# Patient Record
Sex: MALE | Race: WHITE | Employment: UNEMPLOYED | URBAN - METROPOLITAN AREA
[De-identification: names, ages, dates, MRNs, and addresses within clinical notes are randomized per-mention and may not be internally consistent; named-entity substitution may affect disease eponyms.]

---

## 2017-05-11 ENCOUNTER — HOSPITAL ENCOUNTER (OUTPATIENT)
Age: 47
Setting detail: SPECIMEN
Discharge: HOME | End: 2017-05-11
Payer: MEDICARE

## 2017-05-11 LAB
ABSOLUTE EOS #: 0.2 K/UL (ref 0–0.4)
ABSOLUTE LYMPH #: 2.3 K/UL (ref 1–4.8)
ABSOLUTE MONO #: 0.5 K/UL (ref 0.1–1.2)
ALBUMIN SERPL-MCNC: 4.1 G/DL (ref 3.5–5.2)
ALBUMIN/GLOBULIN RATIO: 1.5 (ref 1–2.5)
ALP BLD-CCNC: 81 U/L (ref 40–129)
ALT SERPL-CCNC: 13 U/L (ref 5–41)
ANION GAP SERPL CALCULATED.3IONS-SCNC: 10 MMOL/L (ref 9–17)
AST SERPL-CCNC: 12 U/L
BASOPHILS # BLD: 0 %
BASOPHILS ABSOLUTE: 0 K/UL (ref 0–0.2)
BILIRUB SERPL-MCNC: 0.9 MG/DL (ref 0.3–1.2)
BUN BLDV-MCNC: 8 MG/DL (ref 6–20)
BUN/CREAT BLD: ABNORMAL (ref 9–20)
CALCIUM SERPL-MCNC: 9.1 MG/DL (ref 8.6–10.4)
CHLORIDE BLD-SCNC: 106 MMOL/L (ref 98–107)
CHOLESTEROL/HDL RATIO: 5.5
CHOLESTEROL: 193 MG/DL
CO2: 27 MMOL/L (ref 20–31)
CREAT SERPL-MCNC: 0.87 MG/DL (ref 0.7–1.2)
DIFFERENTIAL TYPE: NORMAL
EOSINOPHILS RELATIVE PERCENT: 3 %
ESTIMATED AVERAGE GLUCOSE: 88 MG/DL
GFR AFRICAN AMERICAN: >60 ML/MIN
GFR NON-AFRICAN AMERICAN: >60 ML/MIN
GFR SERPL CREATININE-BSD FRML MDRD: ABNORMAL ML/MIN/{1.73_M2}
GFR SERPL CREATININE-BSD FRML MDRD: ABNORMAL ML/MIN/{1.73_M2}
GLUCOSE BLD-MCNC: 107 MG/DL (ref 70–99)
HBA1C MFR BLD: 4.7 % (ref 4–6)
HCT VFR BLD CALC: 42.9 % (ref 41–53)
HDLC SERPL-MCNC: 35 MG/DL
HEMOGLOBIN: 14.7 G/DL (ref 13.5–17.5)
LDL CHOLESTEROL: 116 MG/DL (ref 0–130)
LYMPHOCYTES # BLD: 29 %
MCH RBC QN AUTO: 31.3 PG (ref 26–34)
MCHC RBC AUTO-ENTMCNC: 34.3 G/DL (ref 31–37)
MCV RBC AUTO: 91.1 FL (ref 80–100)
MONOCYTES # BLD: 6 %
PDW BLD-RTO: 13.1 % (ref 12.5–15.4)
PLATELET # BLD: 270 K/UL (ref 140–450)
PLATELET ESTIMATE: NORMAL
PMV BLD AUTO: 8.2 FL (ref 6–12)
POTASSIUM SERPL-SCNC: 3.7 MMOL/L (ref 3.7–5.3)
RBC # BLD: 4.71 M/UL (ref 4.5–5.9)
RBC # BLD: NORMAL 10*6/UL
SEG NEUTROPHILS: 62 %
SEGMENTED NEUTROPHILS ABSOLUTE COUNT: 5 K/UL (ref 1.8–7.7)
SODIUM BLD-SCNC: 143 MMOL/L (ref 135–144)
TOTAL PROTEIN: 6.8 G/DL (ref 6.4–8.3)
TRIGL SERPL-MCNC: 210 MG/DL
TSH SERPL DL<=0.05 MIU/L-ACNC: 1.98 MIU/L (ref 0.3–5)
VITAMIN D 25-HYDROXY: 28.4 NG/ML (ref 30–100)
VLDLC SERPL CALC-MCNC: ABNORMAL MG/DL (ref 1–30)
WBC # BLD: 8.1 K/UL (ref 3.5–11)
WBC # BLD: NORMAL 10*3/UL

## 2017-10-04 ENCOUNTER — HOSPITAL ENCOUNTER (OUTPATIENT)
Age: 47
Setting detail: SPECIMEN
Discharge: HOME | End: 2017-10-04
Payer: MEDICARE

## 2017-10-04 LAB
ALBUMIN SERPL-MCNC: 4.1 G/DL (ref 3.5–5.2)
ALBUMIN/GLOBULIN RATIO: 1.6 (ref 1–2.5)
ALP BLD-CCNC: 70 U/L (ref 40–129)
ALT SERPL-CCNC: 17 U/L (ref 5–41)
ANION GAP SERPL CALCULATED.3IONS-SCNC: 9 MMOL/L (ref 9–17)
AST SERPL-CCNC: 13 U/L
BILIRUB SERPL-MCNC: 0.67 MG/DL (ref 0.3–1.2)
BUN BLDV-MCNC: 6 MG/DL (ref 6–20)
BUN/CREAT BLD: ABNORMAL (ref 9–20)
CALCIUM SERPL-MCNC: 9.2 MG/DL (ref 8.6–10.4)
CHLORIDE BLD-SCNC: 107 MMOL/L (ref 98–107)
CHOLESTEROL/HDL RATIO: 4.5
CHOLESTEROL: 165 MG/DL
CO2: 26 MMOL/L (ref 20–31)
CREAT SERPL-MCNC: 0.84 MG/DL (ref 0.7–1.2)
GFR AFRICAN AMERICAN: >60 ML/MIN
GFR NON-AFRICAN AMERICAN: >60 ML/MIN
GFR SERPL CREATININE-BSD FRML MDRD: ABNORMAL ML/MIN/{1.73_M2}
GFR SERPL CREATININE-BSD FRML MDRD: ABNORMAL ML/MIN/{1.73_M2}
GLUCOSE BLD-MCNC: 112 MG/DL (ref 70–99)
HDLC SERPL-MCNC: 37 MG/DL
LDL CHOLESTEROL: 91 MG/DL (ref 0–130)
POTASSIUM SERPL-SCNC: 4.3 MMOL/L (ref 3.7–5.3)
SODIUM BLD-SCNC: 142 MMOL/L (ref 135–144)
TOTAL PROTEIN: 6.7 G/DL (ref 6.4–8.3)
TRIGL SERPL-MCNC: 183 MG/DL
VLDLC SERPL CALC-MCNC: ABNORMAL MG/DL (ref 1–30)

## 2020-02-03 ENCOUNTER — HOSPITAL ENCOUNTER (OUTPATIENT)
Age: 50
Setting detail: SPECIMEN
Discharge: HOME | End: 2020-02-03
Payer: MEDICARE

## 2020-02-03 LAB
ALBUMIN SERPL-MCNC: 4.5 G/DL (ref 3.5–5.2)
ALBUMIN/GLOBULIN RATIO: 1.5 (ref 1–2.5)
ALP BLD-CCNC: 117 U/L (ref 40–129)
ALT SERPL-CCNC: 28 U/L (ref 5–41)
ANION GAP SERPL CALCULATED.3IONS-SCNC: 17 MMOL/L (ref 9–17)
AST SERPL-CCNC: 24 U/L
BILIRUB SERPL-MCNC: 0.5 MG/DL (ref 0.3–1.2)
BUN BLDV-MCNC: 10 MG/DL (ref 6–20)
BUN/CREAT BLD: ABNORMAL (ref 9–20)
CALCIUM SERPL-MCNC: 9.6 MG/DL (ref 8.6–10.4)
CHLORIDE BLD-SCNC: 108 MMOL/L (ref 98–107)
CO2: 19 MMOL/L (ref 20–31)
CREAT SERPL-MCNC: 0.97 MG/DL (ref 0.7–1.2)
FERRITIN: 119 UG/L (ref 30–400)
GFR AFRICAN AMERICAN: >60 ML/MIN
GFR NON-AFRICAN AMERICAN: >60 ML/MIN
GFR SERPL CREATININE-BSD FRML MDRD: ABNORMAL ML/MIN/{1.73_M2}
GFR SERPL CREATININE-BSD FRML MDRD: ABNORMAL ML/MIN/{1.73_M2}
GLUCOSE BLD-MCNC: 113 MG/DL (ref 70–99)
IRON SATURATION: 56 % (ref 20–55)
IRON: 156 UG/DL (ref 59–158)
POTASSIUM SERPL-SCNC: 4.4 MMOL/L (ref 3.7–5.3)
SODIUM BLD-SCNC: 144 MMOL/L (ref 135–144)
TOTAL IRON BINDING CAPACITY: 280 UG/DL (ref 250–450)
TOTAL PROTEIN: 7.5 G/DL (ref 6.4–8.3)
UNSATURATED IRON BINDING CAPACITY: 124 UG/DL (ref 112–347)
VITAMIN D 25-HYDROXY: 20.4 NG/ML (ref 30–100)

## 2020-05-11 ENCOUNTER — TELEPHONE (OUTPATIENT)
Dept: PHARMACY | Facility: CLINIC | Age: 50
End: 2020-05-11

## 2020-05-11 NOTE — TELEPHONE ENCOUNTER
CLINICAL PHARMACY: ADHERENCE REVIEW  Identified care gap per [employer]; fills at [facility] healthcare: ACE/ARB adherence    Last Office Visit: unknown    Patient also appears to be taking: Lisinopril 20mg    ASSESSMENT  ACE/ARB ADHERENCE  PDC: 58%     Lisinopril last filled on 5/04/20 for a 30 day supply. 3 refills remaining. Billed through [employer]    BP Readings from Last 3 Encounters:   05/30/13 139/87   02/12/13 (!) 140/92   09/04/12 (!) 134/92     CrCl cannot be calculated (Unknown ideal weight.). PLAN  Reached patient to review. Patient declined a 90ds because he gets bubble packs and that helps him remember to take his medication. CLINICAL PHARMACY CONSULT: MED RECONCILIATION/REVIEW ADDENDUM    For Pharmacy Admin Tracking Only    PHSO: Yes  Total # of Interventions Recommended: 1  - New Order #: 0 New Medication Order Reason(s):  Adherence  - Maintenance Safety Lab Monitoring #: 1  - New Therapy Lab Monitoring #: 0  Recommended intervention potential cost savings: 0  Total Interventions Accepted: 0  Time Spent (min): Sorin Martinez

## 2020-06-22 ENCOUNTER — TELEPHONE (OUTPATIENT)
Dept: PHARMACY | Facility: CLINIC | Age: 50
End: 2020-06-22

## 2022-01-04 SDOH — HEALTH STABILITY: PHYSICAL HEALTH: ON AVERAGE, HOW MANY DAYS PER WEEK DO YOU ENGAGE IN MODERATE TO STRENUOUS EXERCISE (LIKE A BRISK WALK)?: 2 DAYS

## 2022-01-04 SDOH — HEALTH STABILITY: PHYSICAL HEALTH: ON AVERAGE, HOW MANY MINUTES DO YOU ENGAGE IN EXERCISE AT THIS LEVEL?: 90 MIN

## 2022-01-05 ENCOUNTER — OFFICE VISIT (OUTPATIENT)
Dept: FAMILY MEDICINE CLINIC | Age: 52
End: 2022-01-05
Payer: MEDICARE

## 2022-01-05 VITALS
BODY MASS INDEX: 30.35 KG/M2 | HEIGHT: 70 IN | RESPIRATION RATE: 20 BRPM | SYSTOLIC BLOOD PRESSURE: 144 MMHG | OXYGEN SATURATION: 98 % | DIASTOLIC BLOOD PRESSURE: 100 MMHG | HEART RATE: 96 BPM | WEIGHT: 212 LBS

## 2022-01-05 DIAGNOSIS — E78.5 HYPERLIPIDEMIA, UNSPECIFIED HYPERLIPIDEMIA TYPE: ICD-10-CM

## 2022-01-05 DIAGNOSIS — M48.061 SPINAL STENOSIS OF LUMBAR REGION, UNSPECIFIED WHETHER NEUROGENIC CLAUDICATION PRESENT: ICD-10-CM

## 2022-01-05 DIAGNOSIS — E23.2 DIABETES INSIPIDUS (HCC): ICD-10-CM

## 2022-01-05 DIAGNOSIS — R73.9 ELEVATED BLOOD SUGAR: ICD-10-CM

## 2022-01-05 DIAGNOSIS — Z13.29 SCREENING FOR THYROID DISORDER: ICD-10-CM

## 2022-01-05 DIAGNOSIS — I10 PRIMARY HYPERTENSION: Primary | ICD-10-CM

## 2022-01-05 DIAGNOSIS — F51.01 PRIMARY INSOMNIA: ICD-10-CM

## 2022-01-05 DIAGNOSIS — F31.31 BIPOLAR AFFECTIVE DISORDER, CURRENTLY DEPRESSED, MILD (HCC): ICD-10-CM

## 2022-01-05 PROCEDURE — 99203 OFFICE O/P NEW LOW 30 MIN: CPT | Performed by: INTERNAL MEDICINE

## 2022-01-05 RX ORDER — BUPROPION HYDROCHLORIDE 300 MG/1
TABLET ORAL
COMMUNITY
Start: 2019-05-04

## 2022-01-05 RX ORDER — HYDROCHLOROTHIAZIDE 25 MG/1
25 TABLET ORAL EVERY MORNING
Qty: 30 TABLET | Refills: 5 | Status: SHIPPED | OUTPATIENT
Start: 2022-01-05 | End: 2022-02-08 | Stop reason: SDUPTHER

## 2022-01-05 RX ORDER — KRILL/OM-3/DHA/EPA/PHOSPHO/AST 500MG-86MG
CAPSULE ORAL
COMMUNITY

## 2022-01-05 RX ORDER — LISINOPRIL 40 MG/1
TABLET ORAL
COMMUNITY
Start: 2021-12-03 | End: 2022-02-08 | Stop reason: SDUPTHER

## 2022-01-05 RX ORDER — PANTOPRAZOLE SODIUM 20 MG/1
TABLET, DELAYED RELEASE ORAL
COMMUNITY
Start: 2020-06-01 | End: 2022-02-08 | Stop reason: SDUPTHER

## 2022-01-05 RX ORDER — TAMSULOSIN HYDROCHLORIDE 0.4 MG/1
CAPSULE ORAL
COMMUNITY
Start: 2020-08-04

## 2022-01-05 RX ORDER — PSYLLIUM HUSK 0.4 G
CAPSULE ORAL
COMMUNITY

## 2022-01-05 RX ORDER — ACETAMINOPHEN, ASPIRIN AND CAFFEINE 250; 250; 65 MG/1; MG/1; MG/1
1 TABLET, FILM COATED ORAL EVERY 6 HOURS PRN
COMMUNITY

## 2022-01-05 RX ORDER — BUPROPION HYDROCHLORIDE 150 MG/1
TABLET ORAL
COMMUNITY
Start: 2019-05-04

## 2022-01-05 RX ORDER — OXCARBAZEPINE 600 MG/1
TABLET, FILM COATED ORAL
COMMUNITY

## 2022-01-05 RX ORDER — FLUOXETINE HYDROCHLORIDE 40 MG/1
CAPSULE ORAL
COMMUNITY
Start: 2020-01-01

## 2022-01-05 RX ORDER — AMILORIDE HYDROCHLORIDE 5 MG/1
TABLET ORAL
COMMUNITY
Start: 2018-05-04 | End: 2022-02-08 | Stop reason: SDUPTHER

## 2022-01-05 RX ORDER — ACETAMINOPHEN 500 MG
TABLET ORAL EVERY 6 HOURS PRN
COMMUNITY
Start: 1985-01-01

## 2022-01-05 RX ORDER — LORATADINE 10 MG/1
TABLET ORAL
COMMUNITY
Start: 1998-05-01 | End: 2022-04-18 | Stop reason: SDUPTHER

## 2022-01-05 RX ORDER — HYDROXYZINE HYDROCHLORIDE 25 MG/1
25 TABLET, FILM COATED ORAL NIGHTLY
Qty: 30 TABLET | Refills: 0 | Status: SHIPPED | OUTPATIENT
Start: 2022-01-05 | End: 2022-02-04

## 2022-01-05 RX ORDER — MULTIVIT-MIN/IRON/FOLIC ACID/K 18-600-40
CAPSULE ORAL
COMMUNITY

## 2022-01-05 SDOH — ECONOMIC STABILITY: FOOD INSECURITY: WITHIN THE PAST 12 MONTHS, YOU WORRIED THAT YOUR FOOD WOULD RUN OUT BEFORE YOU GOT MONEY TO BUY MORE.: NEVER TRUE

## 2022-01-05 SDOH — ECONOMIC STABILITY: FOOD INSECURITY: WITHIN THE PAST 12 MONTHS, THE FOOD YOU BOUGHT JUST DIDN'T LAST AND YOU DIDN'T HAVE MONEY TO GET MORE.: NEVER TRUE

## 2022-01-05 ASSESSMENT — PATIENT HEALTH QUESTIONNAIRE - PHQ9
1. LITTLE INTEREST OR PLEASURE IN DOING THINGS: 1
SUM OF ALL RESPONSES TO PHQ QUESTIONS 1-9: 1
SUM OF ALL RESPONSES TO PHQ9 QUESTIONS 1 & 2: 1
2. FEELING DOWN, DEPRESSED OR HOPELESS: 0
SUM OF ALL RESPONSES TO PHQ QUESTIONS 1-9: 1
SUM OF ALL RESPONSES TO PHQ9 QUESTIONS 1 & 2: 1
SUM OF ALL RESPONSES TO PHQ QUESTIONS 1-9: 1
2. FEELING DOWN, DEPRESSED OR HOPELESS: 0
SUM OF ALL RESPONSES TO PHQ QUESTIONS 1-9: 1
1. LITTLE INTEREST OR PLEASURE IN DOING THINGS: 1
SUM OF ALL RESPONSES TO PHQ QUESTIONS 1-9: 1
SUM OF ALL RESPONSES TO PHQ QUESTIONS 1-9: 1

## 2022-01-05 ASSESSMENT — SOCIAL DETERMINANTS OF HEALTH (SDOH): HOW HARD IS IT FOR YOU TO PAY FOR THE VERY BASICS LIKE FOOD, HOUSING, MEDICAL CARE, AND HEATING?: NOT HARD AT ALL

## 2022-01-05 NOTE — PROGRESS NOTES
Subjective:       Patient ID:     Tyree Gregorio is a 46 y.o. male who presents for   Chief Complaint   Patient presents with   Rosalino Meraz Doctor     last seen about November 2021    [address] [facility]     colonoscopy-[facility], last blood work was at [facility] in November       HPI:  Nursing note reviewed and discussed with patient. New patient, transferring care from Dr Rhonda Veronica at [facility]. Hypertension- BP is going up, meds being titrated for past six months but still not controlled. Has some intermittent chest pain, day or night. Midsternal, pressure, not quite an elephant but more like a heavy person sitting on his chest, constant for a few hours at a time. More when he is active and moving around, relieved spontaneously with rest. Tolerating current regimen without palpitations, dizziness, peripheral edema, dyspnea on exertion, orthopnea, paroxysmal nocturnal dyspnea. On amiloride for diabetes insipidus due to lithium - was following with Dr Lucie Burnett at [facility]. Still waking up every 2 hours to urinate. Following with Dr Lexis Brady for enlarged prostate - on flomax, had a resume procedure. Following with [facility] for renal cancer - partial nephrectomy 2018 - gets seen urology   Following with Dr Kathryn Hernández at [facility] on [address] - bipolar disorder. Has been started on doxepin to help with sleep, but it made him manic. GERD - well controlled with omeprazole. Denies heartburn with food, nocturnal reflux, dysphagia, weight changes, anorexia, melena, hematochezia, diarrhea, nausea, vomiting. Hyperlipidemia-tolerating current regimen without myalgias, dyspepsia, jaundice. Mostly compliant with diet recommendations for low salt diet, tries to limit greasy/cheesy/fried foods, not very compliant with exercise recommendations.     Cardiovascular risk factors: dyslipidemia, hypertension, male gender and sedentary lifestyle          Patient's medications, allergies, past medical, surgical, social and family histories were reviewed and updated as appropriate. Past Medical History:   Diagnosis Date    Bipolar disorder ([facility]     carlene Romero, FARHAD, and romeo oquendo- psych services    Diverticulosis     colonoscopy 10/2012    HLD (hyperlipidemia)     HTN (hypertension)     Internal hemorrhoid     colonoscopy 10/2012     Past Surgical History:   Procedure Laterality Date    COLONOSCOPY  10/2012       Social History     Tobacco Use    Smoking status: Former Smoker     Packs/day: 1.00     Years: 15.00     Pack years: 15.00     Quit date: 7/5/2018     Years since quitting: 3.5    Smokeless tobacco: Never Used   Substance Use Topics    Alcohol use: No     Comment: not for 3 weeks      Patient Active Problem List   Diagnosis    Bipolar disorder ([facility]    HTN (hypertension)    Change in bowel habits    Diverticulosis    Internal hemorrhoid    HLD (hyperlipidemia)    Bipolar disorder ([facility]         Prior to Visit Medications    Medication Sig Taking?  Authorizing Provider   Calcium Carb-Cholecalciferol (CALCIUM 1000 + D) 1000-800 MG-UNIT TABS calcium   1 tablet by mouth once daily Yes Historical Provider, MD   Court Pert Oil 500 MG CAPS krill oil   once daily Yes Historical Provider, MD   Multiple Vitamins-Minerals (MULTIVITAMIN ADULT EXTRA C PO) multivitamin   1 tablet by mouth once daily Yes Historical Provider, MD   zinc 50 MG CAPS zinc   1 tablet by mouth once daily Yes Historical Provider, MD   acetaminophen (TYLENOL) 500 MG tablet  Yes Historical Provider, MD   OXcarbazepine (TRILEPTAL) 600 MG tablet oxcarbazepine 600 mg tablet Yes Historical Provider, MD   buPROPion (WELLBUTRIN XL) 150 MG extended release tablet bupropion HCl  mg 24 hr tablet, extended release Yes Historical Provider, MD   buPROPion (WELLBUTRIN XL) 300 MG extended release tablet bupropion HCl  mg 24 hr tablet, extended release Yes Historical Provider, MD   FLUoxetine (PROZAC) 40 MG capsule fluoxetine 40 mg capsule Yes Historical Provider, MD   loratadine (CLARITIN) 10 MG tablet  Yes Historical Provider, MD   lisinopril (PRINIVIL;ZESTRIL) 40 MG tablet  Yes Historical Provider, MD   aMILoride (MIDAMOR) 5 MG tablet amiloride 5 mg tablet Yes Historical Provider, MD   tamsulosin (FLOMAX) 0.4 MG capsule tamsulosin 0.4 mg capsule Yes Historical Provider, MD   pantoprazole (PROTONIX) 20 MG tablet pantoprazole 20 mg tablet,delayed release Yes Historical Provider, MD   Ascorbic Acid (VITAMIN C) 500 MG CAPS Take by mouth Yes Historical Provider, MD   Cholecalciferol (VITAMIN D3) 125 MCG (5000 UT) TABS Take by mouth Yes Historical Provider, MD   aspirin-acetaminophen-caffeine (Jasiel King) 785-244-48 MG per tablet Take 1 tablet by mouth every 6 hours as needed for Headaches Yes Historical Provider, MD   Multiple Vitamins-Minerals (AIRBORNE PO) Take by mouth Yes Historical Provider, MD   amLODIPine (NORVASC) 10 MG tablet TAKE 1 TABLET BY MOUTH DAILY Yes Gini Uriostegui MD     Review of Systems  Review of Systems   Constitutional: Negative for fatigue, fever and unexpected weight change. Respiratory: Negative for cough, choking, chest tightness, shortness of breath and wheezing. Cardiovascular: Negative for chest pain, palpitations and leg swelling. Gastrointestinal: Negative for abdominal pain, anal bleeding, blood in stool, constipation, diarrhea, nausea and vomiting. Endocrine: Negative. Musculoskeletal: Negative for joint swelling and myalgias. Skin: Negative. Neurological: Negative for dizziness. Psychiatric/Behavioral: Negative for sleep disturbance. All other systems reviewed and are negative. Objective:       Physical Exam:  BP (!) 148/92   Pulse 96   Resp 20   Ht 5' 10\" (1.778 m)   Wt 212 lb (96.2 kg)   SpO2 98%   BMI 30.42 kg/m²   Physical Exam  Vitals and nursing note reviewed. Constitutional:       General: He is not in acute distress. Appearance: He is well-developed.  He is not ill-appearing. Eyes:      General: Lids are normal. Vision grossly intact. Cardiovascular:      Rate and Rhythm: Normal rate and regular rhythm. Heart sounds: Normal heart sounds, S1 normal and S2 normal. No murmur heard. No friction rub. No gallop. Pulmonary:      Effort: Pulmonary effort is normal. No respiratory distress. Breath sounds: Normal breath sounds. No wheezing. Abdominal:      General: Bowel sounds are normal.      Palpations: Abdomen is soft. There is no mass. Tenderness: There is no abdominal tenderness. There is no guarding. Musculoskeletal:         General: Normal range of motion. Skin:     General: Skin is warm and dry. Capillary Refill: Capillary refill takes less than 2 seconds. Neurological:      General: No focal deficit present. Mental Status: He is alert and oriented to person, place, and time. Data Review  Old labs in chart reviewed       Assessment/Plan:      1. Primary hypertension  Continue current regimen   - hydroCHLOROthiazide (HYDRODIURIL) 25 MG tablet; Take 1 tablet by mouth every morning  Dispense: 30 tablet; Refill: 5  - Comprehensive Metabolic Panel; Future    2. Diabetes insipidus (HCC)  - hydroCHLOROthiazide (HYDRODIURIL) 25 MG tablet; Take 1 tablet by mouth every morning  Dispense: 30 tablet; Refill: 5  - Comprehensive Metabolic Panel; Future    3. Spinal stenosis of lumbar region, unspecified whether neurogenic claudication present  - External Referral To Pain Clinic    4. Hyperlipidemia, unspecified hyperlipidemia type  - Lipid, Fasting; Future    5. Screening for thyroid disorder  - TSH With Reflex Ft4; Future    6. Elevated blood sugar  - Hemoglobin A1C; Future    7. Primary insomnia  - hydrOXYzine (ATARAX) 25 MG tablet; Take 1 tablet by mouth nightly  Dispense: 30 tablet; Refill: 0    8.  Bipolar affective disorder, currently depressed, mild ([facility]  F/u psychiatry - continue current regimen per psychiatry            Health Maintenance Due   Topic Date Due    Depression Screen  Never done    DTaP/Tdap/Td vaccine (1 - Tdap) Never done    Annual Wellness Visit (AWV)  Never done    Diabetes screen  05/11/2020    Shingles Vaccine (1 of 2) Never done    Potassium monitoring  02/03/2021    Creatinine monitoring  02/03/2021       Electronically signed by Artie Whitten MD on 1/5/2022 at 11:50 AM

## 2022-01-05 NOTE — PATIENT INSTRUCTIONS
Your labs have been ordered today as fasting labs - this means you will need to fast overnight for at least 8 hours before you come in to get the blood drawn. You may have water, black tea or coffee without sugar or creamer prior to coming in for labs. Labs due 1/19/22 or later   Your lab results will be released to your [employer] account as they are resulted by the lab. I will send you a message regarding your results once I have had a chance to review them, usually within a couple of days, so if you have not heard from me it means I'm either waiting for some results, or that I have not had a moment to review the results.

## 2022-01-09 ASSESSMENT — ENCOUNTER SYMPTOMS
ANAL BLEEDING: 0
BLOOD IN STOOL: 0
ABDOMINAL PAIN: 0
WHEEZING: 0
DIARRHEA: 0
VOMITING: 0
CHEST TIGHTNESS: 0
NAUSEA: 0
SHORTNESS OF BREATH: 0
CHOKING: 0
CONSTIPATION: 0
COUGH: 0

## 2022-01-09 ASSESSMENT — VISUAL ACUITY: OU: 1

## 2022-01-21 ENCOUNTER — HOSPITAL ENCOUNTER (OUTPATIENT)
Age: 52
Setting detail: SPECIMEN
Discharge: HOME | End: 2022-01-21

## 2022-01-21 DIAGNOSIS — R73.9 ELEVATED BLOOD SUGAR: ICD-10-CM

## 2022-01-21 DIAGNOSIS — Z13.29 SCREENING FOR THYROID DISORDER: ICD-10-CM

## 2022-01-21 DIAGNOSIS — E23.2 DIABETES INSIPIDUS (HCC): ICD-10-CM

## 2022-01-21 DIAGNOSIS — E78.5 HYPERLIPIDEMIA, UNSPECIFIED HYPERLIPIDEMIA TYPE: ICD-10-CM

## 2022-01-21 DIAGNOSIS — I10 PRIMARY HYPERTENSION: ICD-10-CM

## 2022-01-21 LAB
ALBUMIN SERPL-MCNC: 4.2 G/DL (ref 3.5–5.2)
ALBUMIN/GLOBULIN RATIO: 1.5 (ref 1–2.5)
ALP BLD-CCNC: 136 U/L (ref 40–129)
ALT SERPL-CCNC: 25 U/L (ref 5–41)
ANION GAP SERPL CALCULATED.3IONS-SCNC: 15 MMOL/L (ref 9–17)
AST SERPL-CCNC: 21 U/L
BILIRUB SERPL-MCNC: 0.58 MG/DL (ref 0.3–1.2)
BUN BLDV-MCNC: 22 MG/DL (ref 6–20)
BUN/CREAT BLD: ABNORMAL (ref 9–20)
CALCIUM SERPL-MCNC: 9.7 MG/DL (ref 8.6–10.4)
CHLORIDE BLD-SCNC: 103 MMOL/L (ref 98–107)
CHOLESTEROL, FASTING: 218 MG/DL
CHOLESTEROL/HDL RATIO: 5.7
CO2: 25 MMOL/L (ref 20–31)
CREAT SERPL-MCNC: 1.13 MG/DL (ref 0.7–1.2)
ESTIMATED AVERAGE GLUCOSE: 97 MG/DL
GFR AFRICAN AMERICAN: >60 ML/MIN
GFR NON-AFRICAN AMERICAN: >60 ML/MIN
GFR SERPL CREATININE-BSD FRML MDRD: ABNORMAL ML/MIN/{1.73_M2}
GFR SERPL CREATININE-BSD FRML MDRD: ABNORMAL ML/MIN/{1.73_M2}
GLUCOSE BLD-MCNC: 101 MG/DL (ref 70–99)
HBA1C MFR BLD: 5 % (ref 4–6)
HDLC SERPL-MCNC: 38 MG/DL
LDL CHOLESTEROL: 118 MG/DL (ref 0–130)
POTASSIUM SERPL-SCNC: 3.7 MMOL/L (ref 3.7–5.3)
SODIUM BLD-SCNC: 143 MMOL/L (ref 135–144)
TOTAL PROTEIN: 7 G/DL (ref 6.4–8.3)
TRIGLYCERIDE, FASTING: 312 MG/DL
TSH SERPL DL<=0.05 MIU/L-ACNC: 1.17 MIU/L (ref 0.3–5)
VLDLC SERPL CALC-MCNC: ABNORMAL MG/DL (ref 1–30)

## 2022-02-04 NOTE — RESULT ENCOUNTER NOTE
Pt notified via [employer] Your lab results were stable, we will repeat them next year. Sodium level stable on current regimen. Please call the office with any questions.      - AS

## 2022-02-08 ENCOUNTER — OFFICE VISIT (OUTPATIENT)
Dept: FAMILY MEDICINE CLINIC | Age: 52
End: 2022-02-08
Payer: MEDICARE

## 2022-02-08 VITALS
DIASTOLIC BLOOD PRESSURE: 90 MMHG | SYSTOLIC BLOOD PRESSURE: 132 MMHG | TEMPERATURE: 98.6 F | WEIGHT: 210.2 LBS | HEART RATE: 94 BPM | OXYGEN SATURATION: 97 % | BODY MASS INDEX: 30.16 KG/M2

## 2022-02-08 DIAGNOSIS — E78.2 MIXED HYPERLIPIDEMIA: ICD-10-CM

## 2022-02-08 DIAGNOSIS — Z85.528 HISTORY OF RENAL CELL CARCINOMA: ICD-10-CM

## 2022-02-08 DIAGNOSIS — L72.9 SCALP CYST: ICD-10-CM

## 2022-02-08 DIAGNOSIS — G25.81 RESTLESS LEG: ICD-10-CM

## 2022-02-08 DIAGNOSIS — I10 PRIMARY HYPERTENSION: Primary | ICD-10-CM

## 2022-02-08 DIAGNOSIS — F51.01 PRIMARY INSOMNIA: ICD-10-CM

## 2022-02-08 DIAGNOSIS — E23.2 DIABETES INSIPIDUS (HCC): ICD-10-CM

## 2022-02-08 DIAGNOSIS — F31.31 BIPOLAR AFFECTIVE DISORDER, CURRENTLY DEPRESSED, MILD (HCC): ICD-10-CM

## 2022-02-08 DIAGNOSIS — K21.9 GASTROESOPHAGEAL REFLUX DISEASE WITHOUT ESOPHAGITIS: ICD-10-CM

## 2022-02-08 PROCEDURE — 99214 OFFICE O/P EST MOD 30 MIN: CPT | Performed by: INTERNAL MEDICINE

## 2022-02-08 RX ORDER — HYDROXYZINE 50 MG/1
50 TABLET, FILM COATED ORAL NIGHTLY
Qty: 30 TABLET | Refills: 3 | Status: SHIPPED | OUTPATIENT
Start: 2022-02-08 | End: 2022-04-07 | Stop reason: SDUPTHER

## 2022-02-08 RX ORDER — PANTOPRAZOLE SODIUM 20 MG/1
TABLET, DELAYED RELEASE ORAL
Qty: 90 TABLET | Refills: 1 | Status: SHIPPED | OUTPATIENT
Start: 2022-02-08 | End: 2022-08-08 | Stop reason: SDUPTHER

## 2022-02-08 RX ORDER — ROPINIROLE 0.25 MG/1
0.25 TABLET, FILM COATED ORAL NIGHTLY
Qty: 30 TABLET | Refills: 2 | Status: SHIPPED
Start: 2022-02-08 | End: 2022-04-07 | Stop reason: ALTCHOICE

## 2022-02-08 RX ORDER — AMILORIDE HYDROCHLORIDE 5 MG/1
5 TABLET ORAL DAILY
Qty: 90 TABLET | Refills: 1 | Status: SHIPPED | OUTPATIENT
Start: 2022-02-08 | End: 2022-08-08 | Stop reason: SDUPTHER

## 2022-02-08 RX ORDER — LISINOPRIL 40 MG/1
40 TABLET ORAL DAILY
Qty: 90 TABLET | Refills: 1 | Status: SHIPPED | OUTPATIENT
Start: 2022-02-08 | End: 2022-08-08 | Stop reason: SDUPTHER

## 2022-02-08 RX ORDER — AMLODIPINE BESYLATE 10 MG/1
TABLET ORAL
Qty: 90 TABLET | Refills: 1 | Status: SHIPPED | OUTPATIENT
Start: 2022-02-08 | End: 2022-08-08 | Stop reason: SDUPTHER

## 2022-02-08 RX ORDER — HYDROCHLOROTHIAZIDE 25 MG/1
25 TABLET ORAL EVERY MORNING
Qty: 90 TABLET | Refills: 1 | Status: SHIPPED | OUTPATIENT
Start: 2022-02-08 | End: 2022-08-08 | Stop reason: SDUPTHER

## 2022-02-08 RX ORDER — HYDROXYZINE HYDROCHLORIDE 25 MG/1
25 TABLET, FILM COATED ORAL NIGHTLY
COMMUNITY
End: 2022-02-08 | Stop reason: SDUPTHER

## 2022-02-08 ASSESSMENT — ENCOUNTER SYMPTOMS
VOMITING: 0
SHORTNESS OF BREATH: 0
CHEST TIGHTNESS: 0
BLOOD IN STOOL: 0
ANAL BLEEDING: 0
COUGH: 0
WHEEZING: 0
ABDOMINAL PAIN: 0
CONSTIPATION: 0
CHOKING: 0
NAUSEA: 0
DIARRHEA: 0

## 2022-02-08 ASSESSMENT — VISUAL ACUITY: OU: 1

## 2022-02-08 NOTE — PROGRESS NOTES
Subjective:       Patient ID:     Maryam Juárez is a 46 y.o. male who presents for   Chief Complaint   Patient presents with    Mass     back of lower head       HPI:  Nursing note reviewed and discussed with patient. Hypertension-tolerating current regimen without chest pain, palpitations, dizziness, peripheral edema, dyspnea on exertion, orthopnea, paroxysmal nocturnal dyspnea. Hydroxyzine is helping, but still takes him a couple of hours to fall asleep. Waking up multiple times through the night because of restless legs. His wife leaves the bed to go sleep on the couch because of that. Ran out of amiloride this past week, waking up to urinate more at night since ran out. Mostly compliant with diet recommendations for low salt diet, tries to limit greasy/cheesy/fried foods, not very compliant with exercise recommendations. Recurrent scalp cyst that swells up and gets painful, then drains foul smelling fluid and gets better. Current episode started ten days ago, getting better now with warm compresses. No previous evaluation of this lesion, though it has come and gone several times in past few years. Cardiovascular risk factors: hypertension, male gender and sedentary lifestyle        Patient's medications, allergies, past medical, surgical, social and family histories were reviewed and updated as appropriate.     Past Medical History:   Diagnosis Date    [facility]     savi- Cristiano Henriquez NP, and romeo oquendo- psych services    Diverticulosis     colonoscopy 10/2012    HLD (hyperlipidemia)     HTN (hypertension)     Internal hemorrhoid     colonoscopy 10/2012     Past Surgical History:   Procedure Laterality Date    COLONOSCOPY  10/2012       Social History     Tobacco Use    Smoking status: Former Smoker     Packs/day: 1.00     Years: 15.00     Pack years: 15.00     Quit date: 7/5/2018     Years since quitting: 3.6    Smokeless tobacco: Never Used   Substance Use Topics    (PROTONIX) 20 MG tablet pantoprazole 20 mg tablet,delayed release Yes Historical Provider, MD   Ascorbic Acid (VITAMIN C) 500 MG CAPS Take by mouth Yes Historical Provider, MD   Cholecalciferol (VITAMIN D3) 125 MCG (5000 UT) TABS Take by mouth Yes Historical Provider, MD   aspirin-acetaminophen-caffeine (Feliciano Netter) 194-497-69 MG per tablet Take 1 tablet by mouth every 6 hours as needed for Headaches Yes Historical Provider, MD   Multiple Vitamins-Minerals (AIRBORNE PO) Take by mouth Yes Historical Provider, MD   hydroCHLOROthiazide (HYDRODIURIL) 25 MG tablet Take 1 tablet by mouth every morning Yes Barrett Miller MD   amLODIPine (NORVASC) 10 MG tablet TAKE 1 TABLET BY MOUTH DAILY Yes Ne Crowley MD     Review of Systems  Review of Systems   Constitutional: Negative for fatigue, fever and unexpected weight change. Respiratory: Negative for cough, choking, chest tightness, shortness of breath and wheezing. Cardiovascular: Negative for chest pain, palpitations and leg swelling. Gastrointestinal: Negative for abdominal pain, anal bleeding, blood in stool, constipation, diarrhea, nausea and vomiting. Endocrine: Negative. Musculoskeletal: Negative for joint swelling and myalgias. Skin: Negative. Neurological: Negative for dizziness. Psychiatric/Behavioral: Positive for sleep disturbance. All other systems reviewed and are negative. Objective:       Physical Exam:  BP (!) 142/88 (Site: Left Upper Arm, Position: Sitting, Cuff Size: Large Adult)   Pulse 94   Temp 98.6 °F (37 °C) (Temporal)   Wt 210 lb 3.2 oz (95.3 kg)   SpO2 97%   BMI 30.16 kg/m²   Physical Exam  Vitals and nursing note reviewed. Constitutional:       General: He is not in acute distress. Appearance: He is well-developed. He is not ill-appearing. HENT:      Head: Mass (1.5cm diameter fluctuant cyst left temporal area with purulent drainage ) present.      Eyes:      General: Lids are normal. Vision grossly intact. Cardiovascular:      Rate and Rhythm: Normal rate and regular rhythm. Heart sounds: Normal heart sounds, S1 normal and S2 normal. No murmur heard. No friction rub. No gallop. Pulmonary:      Effort: Pulmonary effort is normal. No respiratory distress. Breath sounds: Normal breath sounds. No wheezing. Abdominal:      General: Bowel sounds are normal.      Palpations: Abdomen is soft. There is no mass. Tenderness: There is no abdominal tenderness. There is no guarding. Musculoskeletal:         General: Normal range of motion. Skin:     General: Skin is warm and dry. Capillary Refill: Capillary refill takes less than 2 seconds. Neurological:      General: No focal deficit present. Mental Status: He is alert and oriented to person, place, and time. Data Review  Labs in chart reviewed with patient      The 10-year ASCVD risk score (Media Pile., et al., 2013) is: 7.9%    Values used to calculate the score:      Age: 46 years      Sex: Male      Is Non- : No      Diabetic: No      Tobacco smoker: No      Systolic Blood Pressure: 425 mmHg      Is BP treated: Yes      HDL Cholesterol: 38 mg/dL      Total Cholesterol: 218 mg/dL    Assessment/Plan:      1. Primary hypertension  - lisinopril (PRINIVIL;ZESTRIL) 40 MG tablet; Take 1 tablet by mouth daily  Dispense: 90 tablet; Refill: 1  - amLODIPine (NORVASC) 10 MG tablet; TAKE 1 TABLET BY MOUTH DAILY  Dispense: 90 tablet; Refill: 1  - hydroCHLOROthiazide (HYDRODIURIL) 25 MG tablet; Take 1 tablet by mouth every morning  Dispense: 90 tablet; Refill: 1  - aMILoride (MIDAMOR) 5 MG tablet; Take 1 tablet by mouth daily  Dispense: 90 tablet; Refill: 1    2. Diabetes insipidus (HCC)  - hydroCHLOROthiazide (HYDRODIURIL) 25 MG tablet; Take 1 tablet by mouth every morning  Dispense: 90 tablet; Refill: 1    3. Primary insomnia  - hydrOXYzine (ATARAX) 50 MG tablet;  Take 1 tablet by mouth nightly  Dispense:

## 2022-02-09 ENCOUNTER — TELEPHONE (OUTPATIENT)
Dept: SURGERY | Age: 52
End: 2022-02-09

## 2022-03-11 ENCOUNTER — TELEPHONE (OUTPATIENT)
Dept: FAMILY MEDICINE CLINIC | Age: 52
End: 2022-03-11

## 2022-03-11 ENCOUNTER — HOSPITAL ENCOUNTER (OUTPATIENT)
Age: 52
Setting detail: SPECIMEN
Discharge: HOME | End: 2022-03-11

## 2022-03-11 LAB — PROSTATE SPECIFIC ANTIGEN: 0.47 UG/L

## 2022-03-11 NOTE — TELEPHONE ENCOUNTER
Patient stopped in office for labs and stated that the requip does not help, stated it actually seems to make it worse.  Wondering what else     Ryan on [facility]

## 2022-03-14 RX ORDER — PRAMIPEXOLE DIHYDROCHLORIDE 0.12 MG/1
0.12 TABLET ORAL NIGHTLY
Qty: 15 TABLET | Refills: 1 | Status: SHIPPED | OUTPATIENT
Start: 2022-03-14 | End: 2022-04-07 | Stop reason: SDUPTHER

## 2022-03-14 NOTE — TELEPHONE ENCOUNTER
Short supply of Mirapex sent into pharmacy for patient to try, will increase dose of medication if tolerates without side effects.

## 2022-03-30 ENCOUNTER — OFFICE VISIT (OUTPATIENT)
Dept: SURGERY | Age: 52
End: 2022-03-30
Payer: MEDICARE

## 2022-03-30 ENCOUNTER — TELEPHONE (OUTPATIENT)
Dept: SURGERY | Age: 52
End: 2022-03-30

## 2022-03-30 VITALS — RESPIRATION RATE: 12 BRPM | BODY MASS INDEX: 30.06 KG/M2 | OXYGEN SATURATION: 100 % | WEIGHT: 210 LBS | HEIGHT: 70 IN

## 2022-03-30 DIAGNOSIS — L72.9 SCALP CYST: Primary | ICD-10-CM

## 2022-03-30 PROCEDURE — 99203 OFFICE O/P NEW LOW 30 MIN: CPT | Performed by: PLASTIC SURGERY

## 2022-03-30 NOTE — TELEPHONE ENCOUNTER
Surgery Scheduled/Dr Hu  Excision posterior scalp mass  6/10/22  11:30 am  [facility]    PAT - Phone call    Vaccinated    Patient advised by phone/mail

## 2022-03-30 NOTE — LETTER
[facility]  [id number] [address] [facility] [address]  Phone: [phone]  Fax: [phone]           Derick Rodríguez MD      March 30, 2022     Patient: Rod Mireles   MR Number: [id number]   YOB: 1970   Date of Visit: 3/30/2022       Dear Dr. Radha Bonilla:    Thank you for referring Teo Ruiz to me for evaluation/treatment. Below are the relevant portions of my assessment and plan of care. Plan:  We discussed excision of the cyst and the rest of the associated including but not limited to infection bleeding scarring alopecia in the area of the scar which she currently has, and as well as damage to deeper structures such as nerves and vessels. We also discussed possible scalp numbness. Patient demonstrated understanding. All questions were answered. We will obtain an ultrasound of the area prior to any surgical intervention. If you have questions, please do not hesitate to call me. I look forward to following Ariel Montero along with you.     Sincerely,        Derick Rodríguez MD    CC providers:  Silvano Clark, [address] [place] [address] [address] [id number]  [address] [place]

## 2022-03-30 NOTE — PROGRESS NOTES
[address] [facility]  [facility] [id number]       History and Physical     Chief Complaint   Patient presents with    New Patient     cyst of scalp         HPI:   Georgina Johnson is a 46 y.o. male who presents with a cyst of the posterior scalp that has been present for several years. It continues to fill with pus and patient drained it. It causes patient discomfort specially when it is filled with pus. Patient is here for evaluation treatment. Patient states there is nothing that improves it.       Medications:     Current Outpatient Medications   Medication Sig Dispense Refill    pramipexole (MIRAPEX) 0.125 MG tablet Take 1 tablet by mouth nightly 15 tablet 1    Misc Natural Products (GLUCOSAMINE CHOND CMP TRIPLE) TABS Take 2 tablets by mouth every evening      pantoprazole (PROTONIX) 20 MG tablet pantoprazole 20 mg tablet,delayed release 90 tablet 1    lisinopril (PRINIVIL;ZESTRIL) 40 MG tablet Take 1 tablet by mouth daily 90 tablet 1    amLODIPine (NORVASC) 10 MG tablet TAKE 1 TABLET BY MOUTH DAILY 90 tablet 1    hydroCHLOROthiazide (HYDRODIURIL) 25 MG tablet Take 1 tablet by mouth every morning 90 tablet 1    hydrOXYzine (ATARAX) 50 MG tablet Take 1 tablet by mouth nightly 30 tablet 3    aMILoride (MIDAMOR) 5 MG tablet Take 1 tablet by mouth daily 90 tablet 1    rOPINIRole (REQUIP) 0.25 MG tablet Take 1 tablet by mouth nightly 30 tablet 2    Calcium Carb-Cholecalciferol (CALCIUM 1000 + D) 1000-800 MG-UNIT TABS calcium   1 tablet by mouth once daily      Krill Oil 500 MG CAPS krill oil   once daily      Multiple Vitamins-Minerals (MULTIVITAMIN ADULT EXTRA C PO) multivitamin   1 tablet by mouth once daily      zinc 50 MG CAPS zinc   1 tablet by mouth once daily      acetaminophen (TYLENOL) 500 MG tablet       OXcarbazepine (TRILEPTAL) 600 MG tablet oxcarbazepine 600 mg tablet      buPROPion (WELLBUTRIN XL) 150 MG extended release tablet bupropion HCl  mg 24 hr tablet, extended release      buPROPion (WELLBUTRIN XL) 300 MG extended release tablet bupropion HCl  mg 24 hr tablet, extended release      FLUoxetine (PROZAC) 40 MG capsule fluoxetine 40 mg capsule      loratadine (CLARITIN) 10 MG tablet       tamsulosin (FLOMAX) 0.4 MG capsule tamsulosin 0.4 mg capsule      Ascorbic Acid (VITAMIN C) 500 MG CAPS Take by mouth      Cholecalciferol (VITAMIN D3) 125 MCG (5000 UT) TABS Take by mouth      aspirin-acetaminophen-caffeine (EXCEDRIN MIGRAINE) 250-250-65 MG per tablet Take 1 tablet by mouth every 6 hours as needed for Headaches      Multiple Vitamins-Minerals (AIRBORNE PO) Take by mouth       No current facility-administered medications for this visit. Allergies:   No Known Allergies  Review of Systems:   Constitutional: Negative for fever, chills, fatigue and unexpected weight change. HENT: Negative for hearing loss, sore throat and facial swelling. Eyes: Negative for pain and discharge. Respiratory: Negative for cough and shortness of breath. Cardiovascular: Patient has a history of hyperlipidemia and hypertension  Gastrointestinal: Patient has a history of internal hemorrhoids and diverticulosis. Skin: Negative for pallor and rash. Neurological: Negative for seizures, syncope and numbness. Hematological: Does not bruise/bleed easily. Psychiatric/Behavioral: Negative for behavioral problems. Patient has a history of bipolar disorder.     Past Medical History:   Diagnosis Date    [facility]     savi- Emma Metcalf, FARHAD, and romeo oquendo- psych services    Diverticulosis     colonoscopy 10/2012    HLD (hyperlipidemia)     HTN (hypertension)     Internal hemorrhoid     colonoscopy 10/2012     Past Surgical History:   Procedure Laterality Date    COLONOSCOPY  10/2012     Social History     Socioeconomic History    Marital status:      Spouse name: Not on file    Number of children: Not on file    Years of education: Not on file    Highest education level: Not on file   Occupational History    Not on file   Tobacco Use    Smoking status: Former Smoker     Packs/day: 1.00     Years: 15.00     Pack years: 15.00     Quit date: 7/5/2018     Years since quitting: 3.7    Smokeless tobacco: Never Used   Substance and Sexual Activity    Alcohol use: No     Comment: not for 3 weeks    Drug use: Yes     Comment: not for 3 1/2 weeks    Sexual activity: Not on file   Other Topics Concern    Not on file   Social History Narrative    Not on file     Social Determinants of Health     Financial Resource Strain: Low Risk     Difficulty of Paying Living Expenses: Not hard at all   Food Insecurity: No Food Insecurity    Worried About [id number] in the Last Year: Never true    [address] [employer] in the Last Year: Never true   Transportation Needs:     Lack of Transportation (Medical): Not on file    Lack of Transportation (Non-Medical): Not on file   Physical Activity: Sufficiently Active    Days of Exercise per Week: 2 days    Minutes of Exercise per Session: 90 min   Stress:     Feeling of Stress : Not on file   Social Connections:     Frequency of Communication with Friends and Family: Not on file    Frequency of Social Gatherings with Friends and Family: Not on file    Attends Shinto Services: Not on file    Active Member of Clubs or Organizations: Not on file    Attends Club or Organization Meetings: Not on file    Marital Status: Not on file   Intimate Partner Violence: Not At Risk    Fear of Current or Ex-Partner: No    Emotionally Abused: No    Physically Abused: No    Sexually Abused: No   Housing Stability:     Unable to Pay for Housing in the Last Year: Not on file    Number of [place] in the Last Year: Not on file    Unstable Housing in the Last Year: Not on file     No family history on file.   Physical Exam:   Resp 12   Ht 5' 10\" (1.778 m)   Wt 210 lb (95.3 kg)   SpO2 100%   BMI 30.13 kg/m²    Body mass index is 30.13 kg/m². Physical Exam   Nursing note and vitals reviewed. Constitutional: Oriented to person, place, and time. Appears well-developed and well-nourished. No distress. Head: Normocephalic and atraumatic. Eyes: Conjunctivae and EOM are normal.   Pulmonary/Chest: Effort normal. No respiratory distress. Neurological: Alert and oriented to person, place, and time. Skin:       Left posterior scalp cyst  Psychiatric: Normal mood and affect. Behavior is normal    Imaging:   @[address]@        Impression/Plan:      Diagnosis Orders   1. Scalp cyst  US SOFT TISSUE LIMITED AREA     Patient Active Problem List   Diagnosis    Bipolar disorder ([address]    HTN (hypertension)    Change in bowel habits    Diverticulosis    Internal hemorrhoid    Mixed hyperlipidemia    Bipolar disorder ([address]    Diabetes insipidus ([address]    Spinal stenosis of lumbar region    Primary insomnia    History of renal cell carcinoma    Scalp cyst    Restless leg    Gastroesophageal reflux disease without esophagitis       Plan:  We discussed excision of the cyst and the rest of the associated including but not limited to infection bleeding scarring alopecia in the area of the scar which she currently has, and as well as damage to deeper structures such as nerves and vessels. We also discussed possible scalp numbness. Patient demonstrated understanding. All questions were answered. We will obtain an ultrasound of the area prior to any surgical intervention. The following information was discussed with the patient, but this is not an all-inclusive-other issue were also discussed with patient. GENERAL INFORMATION  The surgical removal of skin lesions and tumors is frequently performed by plastic surgeons. Certain skin lesions and skin tumors will not disappear spontaneously, surgical removal is a treatment option. There are many different techniques for removing skin lesions and skin tumors. ALTERNATIVE TREATMENTS  Alternative forms of management consist of not treating the skin lesion/skin tumor condition. Removal of skin lesions and some superficial skin tumors may be accomplished by other treatments including the use of liquid nitrogen (freezing), lasers, topical medications, and electric cautery. Risks and potential complications are associated with alternative forms of treatment. Deeper tumors cannot be treated by this. RISKS OF SKIN LESION/SKIN TUMOR SURGERY    I have explained to the patient that every surgical procedure involves a certain amount of risk and it is important that an understanding of these risks and the possible complications associated with them is understood. In addition, every procedure has limitations. An individual's choice to undergo a surgical procedure is based on the comparison of the risk to potential benefit. Although some of patients do not experience these complications. Bleeding- It is possible, to experience a bleeding episode during or after surgery. Intraoperative blood transfusions may be required. Should post-operative bleeding occur, it may require an emergency treatment to drain the accumulated blood or blood transfusion. Do not take any aspirin or anti-inflammatory medications for ten days before or after surgery, as this may increase the risk of bleeding. Non-prescription herbs and dietary supplements can increase the risk of surgical bleeding. Hematoma can occur at any time following injury. If blood transfusions are necessary to treat blood loss, there is the risk of blood-related infections such as hepatitis and HIV (AIDS). Heparin medications that are used to prevent blood clots in veins can produce bleeding and decreased blood platelets.   Please check with the physician who prescribed that blood thinners such as aspirin Coumadin etc. Prior to stopping them.    Infection- Infection can occur after surgery. Should an infection occur, additional treatment including antibiotics, hospitalization, or additional surgery may be necessary. Scarring- All surgery leaves scars, some more visible than others. Although wound healing after a surgical procedure is expected, abnormal scars may occur within the skin and deeper tissues. Scars may be unattractive and of different color than the surrounding skin tone. Scar appearance may also vary within the same scar. There is the possibility of visible marks from sutures used to close the wound after the removal of skin lesions and tumors. There is the possibility that scars may limit motion and function. In some cases, scars may require surgical revision or treatment. Obesity: If you're BMI is greater than 30 you may have a higher chance of complications. This may include but not limited to wound healing and infections. Also, if you have other medical problems such as diabetes and hypertension it may affect your healing as well as your surgical results in bleeding. Damage to Deeper Structures- There is the potential for injury to deeper structures including nerves, blood vessels, muscles, and lungs (pneumothorax) during any surgical procedure. The potential for this to occur varies according to where on the body surgery is being performed. Injury to deeper structures may be temporary or permanent. Cancer- In some situations, a skin lesion or tumor that appears to be benign may be determined to be cancerous after laboratory analysis. Additional treatments or surgery may be necessary. Recurrence- In some situation, skin lesions and tumors can recur after surgical excision. Additional treatment or secondary surgery may be necessary. Skin Discoloration / Swelling- Some bruising and swelling normally occurs following surgery.   The skin in or near the surgical site can appear either lighter or darker than surrounding skin. Although uncommon, swelling and skin discoloration may persist for long periods of time and, in rare situations, may be permanent. Skin Sensitivity- Itching, tenderness, or exaggerated responses to hot or cold temperatures may occur after surgery. Usually this resolves during healing, but in some situations, it may be chronic, permanent. Pain- You will experience pain after your surgery. Pain of varying intensity and duration may occur and persist after surgery. Chronic pain may occur from nerves becoming trapped in scar tissue. Sutures- Some surgical techniques use deep sutures. You may notice these sutures after your surgery. Sutures may spontaneously poke through the skin, become visible or produce irritation that requires removal.  Delayed Healing- Wound disruption or delayed wound healing is possible. Some areas of the skin may not heal normally and may take a long time to heal.  Some areas of skin may die, requiring frequent dressing changes or further surgery to remove the non-healed tissue. Smokers have a greater risk of skin loss and wound healing complications. Skin Contour Irregularities- Contour irregularities and depressions may occur after surgery. Visible and palpable wrinkling of skin can occur. Residual skin irregularities at the ends of the incisions or dog ears are always a possibility and may require additional surgery. This may improve with time, or it can be surgically corrected. Allergic Reactions- In some cases, local allergies to tape, suture materials and glues, blood products, topical preparations or injected agents have been reported. Serious systemic reactions including shock (anaphylaxis) may occur to drugs used during surgery and prescription medications. Allergic reactions may require additional treatment. Surgical Anesthesia- Both local and general anesthesia involve risk.   There is the possibility of complications, injury, and even death from all forms of surgical anesthesia or sedation. Change in Skin Sensation- It is common to experience diminished (or loss) of skin sensation in areas that have had surgery. Diminished (or complete loss of skin sensation) may not totally resolve. Shock- In rare circumstances, your surgical procedure can cause severe trauma, particularly when multiple or extensive procedures are performed. Although serious complications are infrequent, infections or excessive fluid loss can lead to severe illness and even death. If surgical shock occurs, hospitalization and additional treatment would be necessary. Unsatisfactory Result- There is no guarantee or warranty expressed or implied, on the results that may be obtained. There is the possibility of a poor result from the removal of skin lesions and tumors. This would include risks such as unacceptable visible deformities, loss of function, poor healing, wound disruption, skin death and loss of sensation. You may be disappointed with the results of reconstructive surgery. It may be necessary to perform additional surgery to improve your results. Cardiac and Pulmonary Complications- Surgery, especially longer procedures, may be associated with the formation of, or increase in, blood clots in the venous system. Pulmonary complications may occur secondarily to both blood clots (pulmonary emboli), fat deposits (fat emboli) or partial collapse of the lungs after general anesthesia. Pulmonary and fat emboli can be life-threatening or fatal in some circumstances. Air travel, inactivity and other conditions may increase the incidence of blood clots traveling to the lungs causing a major blood clot that may result in death. It is important to discuss with your physician any past history of blood clots or swollen legs that may contribute to this condition. Cardiac complications are a risk with any surgery and anesthesia, even in patients without symptoms.   If you experience shortness of breath, chest pains, or unusual heart beats, seek medical attention immediately. Should any of these complications occur, you may require hospitalization and additional treatment. Smoking, Second-Hand Smoke Exposure, Nicotine Products (Patch, Gum, Nasal Spray)-   Patients who are currently smoking, use tobacco products, or nicotine products (patch, gum, or nasal spray) are at a greater risk for significant surgical complications of skin dying, delayed healing, and additional scarring. Individuals exposed to second-hand smoke are also at potential risk for similar complications attributable to nicotine exposure. Additionally, smoking may have a significant negative effect on anesthesia and recovery from anesthesia, with coughing and possibly increased bleeding. Individuals who are not exposed to tobacco smoke or nicotine-containing products have a significantly lower risk of this type of complication. It is important to refrain from smoking at least 8-week weeks before and after surgery. This may apply to secondhand smoking. Mental Health Disorders and Surgery- It is important that all patients seeking to undergo surgery have realistic expectations that focus on improvement rather than perfection. Complications or less than satisfactory results are sometimes unavoidable, may require additional surgery and often are stressful. Please openly discuss with your surgeon, prior to surgery, any history that you may have of significant emotional depression or mental health disorders. Although many individuals may benefit psychologically from the results of surgery, effects on mental health cannot be accurately predicted. Medications- There are many adverse reactions that occur as the result of taking over the counter, herbal, and/or prescription medications.   Be sure to check with your physician about any drug interactions that may exist with medications which you are already taking. If you have an adverse reaction, stop the drugs immediately and call your plastic surgeon for further instructions. If the reaction is severe, go immediately to the nearest emergency room. When taking the prescribed pain medications after surgery, realize that they can affect your thought process and coordination. Do not drive, do not operate complex equipment, do not make any important decisions, and do not drink any alcohol while taking these medications. Be sure to take your prescribed medication only as directed. ADDITIONAL SURGERY NECESSARY  Should complications occur, additional surgery or other treatments may be necessary. Even though risks and complications occur infrequently, the risks cited are the ones that are particularly associated with skin lesion and skin tumor removal.  Other complications and risks can occur but are even more uncommon. The practice of medicine and surgery is not an exact science. Although good results are expected, there is no guarantee or warranty expressed or implied, on the results that may be obtained. PATIENT COMPLIANCE   Follow all physician instructions carefully; this is essential for the success of your outcome. It is important that the surgical incisions are not subjected to excessive force, swelling, abrasion, or motion during the time of healing. Protective dressings and drains should not be removed unless instructed by your plastic surgeon. Successful post-operative function depends on both surgery and subsequent care. Physical activity that increases your pulse or heart rate may cause bruising, swelling, fluid accumulation and the need for return to surgery. It is wise to refrain from intimate physical activities after surgery until your physician states it is safe. It is important that you participate in follow-up care, return for aftercare, and promote your recovery after surgery.       HEALTH INSURANCE  Most health insurance companies exclude coverage for cosmetic surgical operations or any complications that might occur from surgery. Please carefully review your health insurance subscriber information pamphlet. Most insurance plans exclude coverage for secondary or revisionary surgery. Your type of surgery will most likely be covered by your insurance company, I went there is no guarantees or warrantees implied or otherwise. The cost of surgery involves several charges for the services provided. The total includes fees charged by your surgeon, the cost of surgical supplies, anesthesia, laboratory tests, and possible outpatient hospital charges, depending on where the surgery is performed. Depending on whether the cost of surgery is covered by an insurance plan, you will be responsible for necessary co-payments, deductibles, and charges not covered. The fees charged for this procedure do not include any potential future costs for additional procedures that you elect to have or require in order to revise, optimize, or complete your outcome. Additional costs may occur should complications develop from the surgery. Secondary surgery or hospital day-surgery charges involved with revision surgery will also be your responsibility. II have also explained to the patient that we may obtain photographs. These images or illustration along with my medical records created in my case may be used for obtaining insurance approval, for use in examination, may assist in education, testing, credentialing and or certifying by [facility]. These images and records may be used to communicate with referring physician.   Electronically signed by:  Daron Wylie MD 3/30/2022

## 2022-04-07 ENCOUNTER — OFFICE VISIT (OUTPATIENT)
Dept: FAMILY MEDICINE CLINIC | Age: 52
End: 2022-04-07
Payer: MEDICARE

## 2022-04-07 VITALS
HEART RATE: 107 BPM | SYSTOLIC BLOOD PRESSURE: 108 MMHG | WEIGHT: 215.2 LBS | OXYGEN SATURATION: 99 % | BODY MASS INDEX: 30.81 KG/M2 | DIASTOLIC BLOOD PRESSURE: 74 MMHG | HEIGHT: 70 IN | TEMPERATURE: 98.3 F

## 2022-04-07 DIAGNOSIS — Z23 NEED FOR PROPHYLACTIC VACCINATION AND INOCULATION AGAINST VARICELLA: ICD-10-CM

## 2022-04-07 DIAGNOSIS — F51.01 PRIMARY INSOMNIA: ICD-10-CM

## 2022-04-07 DIAGNOSIS — Z71.89 ACP (ADVANCE CARE PLANNING): ICD-10-CM

## 2022-04-07 DIAGNOSIS — F31.9 BIPOLAR AFFECTIVE DISORDER, REMISSION STATUS UNSPECIFIED (HCC): ICD-10-CM

## 2022-04-07 DIAGNOSIS — K21.9 GASTROESOPHAGEAL REFLUX DISEASE WITHOUT ESOPHAGITIS: ICD-10-CM

## 2022-04-07 DIAGNOSIS — E78.5 HYPERLIPIDEMIA, UNSPECIFIED HYPERLIPIDEMIA TYPE: ICD-10-CM

## 2022-04-07 DIAGNOSIS — E66.09 CLASS 1 OBESITY DUE TO EXCESS CALORIES WITH SERIOUS COMORBIDITY AND BODY MASS INDEX (BMI) OF 30.0 TO 30.9 IN ADULT: ICD-10-CM

## 2022-04-07 DIAGNOSIS — G25.81 RESTLESS LEG: ICD-10-CM

## 2022-04-07 DIAGNOSIS — E23.2 DIABETES INSIPIDUS (HCC): ICD-10-CM

## 2022-04-07 DIAGNOSIS — Z00.00 INITIAL MEDICARE ANNUAL WELLNESS VISIT: Primary | ICD-10-CM

## 2022-04-07 PROCEDURE — G0438 PPPS, INITIAL VISIT: HCPCS | Performed by: INTERNAL MEDICINE

## 2022-04-07 PROCEDURE — G0447 BEHAVIOR COUNSEL OBESITY 15M: HCPCS | Performed by: INTERNAL MEDICINE

## 2022-04-07 RX ORDER — PRAMIPEXOLE DIHYDROCHLORIDE 0.12 MG/1
0.12 TABLET ORAL NIGHTLY
Qty: 90 TABLET | Refills: 1 | Status: SHIPPED | OUTPATIENT
Start: 2022-04-07

## 2022-04-07 RX ORDER — HYDROXYZINE 50 MG/1
50 TABLET, FILM COATED ORAL NIGHTLY
Qty: 90 TABLET | Refills: 1 | Status: SHIPPED | OUTPATIENT
Start: 2022-04-07

## 2022-04-07 ASSESSMENT — PATIENT HEALTH QUESTIONNAIRE - PHQ9
2. FEELING DOWN, DEPRESSED OR HOPELESS: 1
5. POOR APPETITE OR OVEREATING: 0
3. TROUBLE FALLING OR STAYING ASLEEP: 3
SUM OF ALL RESPONSES TO PHQ9 QUESTIONS 1 & 2: 4
SUM OF ALL RESPONSES TO PHQ QUESTIONS 1-9: 14
SUM OF ALL RESPONSES TO PHQ QUESTIONS 1-9: 14
9. THOUGHTS THAT YOU WOULD BE BETTER OFF DEAD, OR OF HURTING YOURSELF: 0
8. MOVING OR SPEAKING SO SLOWLY THAT OTHER PEOPLE COULD HAVE NOTICED. OR THE OPPOSITE, BEING SO FIGETY OR RESTLESS THAT YOU HAVE BEEN MOVING AROUND A LOT MORE THAN USUAL: 1
1. LITTLE INTEREST OR PLEASURE IN DOING THINGS: 3
6. FEELING BAD ABOUT YOURSELF - OR THAT YOU ARE A FAILURE OR HAVE LET YOURSELF OR YOUR FAMILY DOWN: 0
SUM OF ALL RESPONSES TO PHQ QUESTIONS 1-9: 14
4. FEELING TIRED OR HAVING LITTLE ENERGY: 3
7. TROUBLE CONCENTRATING ON THINGS, SUCH AS READING THE NEWSPAPER OR WATCHING TELEVISION: 3
10. IF YOU CHECKED OFF ANY PROBLEMS, HOW DIFFICULT HAVE THESE PROBLEMS MADE IT FOR YOU TO DO YOUR WORK, TAKE CARE OF THINGS AT HOME, OR GET ALONG WITH OTHER PEOPLE: 1
SUM OF ALL RESPONSES TO PHQ QUESTIONS 1-9: 14

## 2022-04-07 ASSESSMENT — LIFESTYLE VARIABLES: HOW OFTEN DO YOU HAVE A DRINK CONTAINING ALCOHOL: NEVER

## 2022-04-07 NOTE — PROGRESS NOTES
compliant with diet recommendations for low salt diet, tries to limit greasy/cheesy/fried foods, not very compliant with exercise recommendations. Cardiovascular risk factors: advanced age (older than 54 for men, 72 for women), dyslipidemia, hypertension, male gender and sedentary lifestyle      Patient's complete Health Risk Assessment and screening values have been reviewed and are found in Flowsheets. The following problems were reviewed today and where indicated follow up appointments were made and/or referrals ordered.     Positive Risk Factor Screenings with Interventions:      Depression:  PHQ-2 Score: 4  PHQ-9 Total Score: 14    Severity:1-4 = minimal depression, 5-9 = mild depression, 10-14 = moderate depression, 15-19 = moderately severe depression, 20-27 = severe depression    Depression Interventions:  · following with Dr Wilmar Russo at [facility]      Drug Use Screening:   DAST-10 Score Interpretation:  1-2: Low level - Monitor, re-assess at a later date; 3-5: Moderate level - Further Investigation; 6-8: Substantial level - Intensive Assessment; 9-10: Severe level - Intensive Assessment    Substance Abuse - Drug Use Interventions:  educational materials provided       General Health and ACP:  General  In general, how would you say your health is?: Fair  In the past 7 days, have you experienced any of the following: New or Increased Pain, New or Increased Fatigue, Loneliness, Social Isolation, Stress or Anger?: (!) Yes  Select all that apply: (!) New or Increased Pain,New or Increased Fatigue,Stress,Anger  Do you get the social and emotional support that you need?: Yes  Do you have a Living Will?: (!) No    Advance Directives     Power of  Living Will ACP-Advance Directive ACP-Power of     Not on File Not on File Not on File Not on File      General Health Risk Interventions:  · Pain issues: random shooting pains anywhere in his body - one second to few minutes   · No Living Will: Advance Care Planning addressed with patient today    Health Habits/Nutrition:     Physical Activity: Inactive    Days of Exercise per Week: 0 days    Minutes of Exercise per Session: 0 min     Have you lost any weight without trying in the past 3 months?: No  Body mass index: (!) 30.63  Have you seen the dentist within the past year?: Yes    Health Habits/Nutrition Interventions:  · Inadequate physical activity:  educational materials provided to promote increased physical activity    Hearing/Vision:  Do you or your family notice any trouble with your hearing that hasn't been managed with hearing aids?: (!) Yes  Do you have difficulty driving, watching TV, or doing any of your daily activities because of your eyesight?: No  Have you had an eye exam within the past year?: Yes  No exam data present    Hearing/Vision Interventions:  · Hearing concerns:  patient declines any further evaluation/treatment for hearing issues    Safety:  Do you have working smoke detectors?: Yes  Do you have any tripping hazards - loose or unsecured carpets or rugs?: (!) Yes (cats and dog)  Do you have any tripping hazards - clutter in doorways, halls, or stairs?: No  Do you have either shower bars, grab bars, non-slip mats or non-slip surfaces in your shower or bathtub?: (!) No  Do all of your stairways have a railing or banister?: Yes  Do you always fasten your seatbelt when you are in a car?: Yes    Safety Interventions:  · Home safety tips provided           Objective   Vitals:    04/07/22 1410   BP: 108/74   Pulse: 107   Temp: 98.3 °F (36.8 °C)   TempSrc: Temporal   SpO2: 99%   Weight: 215 lb 3.2 oz (97.6 kg)   Height: 5' 10.28\" (1.785 m)      Body mass index is 30.64 kg/m².         General Appearance: alert and oriented to person, place and time, well developed and well- nourished, in no acute distress  Skin: warm and dry, no rash or erythema  Head: normocephalic and atraumatic  Eyes: pupils equal, round, and reactive to light, extraocular eye movements intact, conjunctivae normal  ENT: tympanic membrane, external ear and ear canal normal bilaterally, nose without deformity, nasal mucosa and turbinates normal without polyps  Neck: supple and non-tender without mass, no thyromegaly or thyroid nodules, no cervical lymphadenopathy  Pulmonary/Chest: clear to auscultation bilaterally- no wheezes, rales or rhonchi, normal air movement, no respiratory distress  Cardiovascular: normal rate, regular rhythm, normal S1 and S2, no murmurs, rubs, clicks, or gallops, distal pulses intact, no carotid bruits  Abdomen: soft, non-tender, non-distended, normal bowel sounds, no masses or organomegaly  Extremities: no cyanosis, clubbing or edema  Musculoskeletal: normal range of motion, no joint swelling, deformity or tenderness  Neurologic: reflexes normal and symmetric, no cranial nerve deficit, gait, coordination and speech normal       Allergies   Allergen Reactions    Other      Prior to Visit Medications    Medication Sig Taking?  Authorizing Provider   pramipexole (MIRAPEX) 0.125 MG tablet Take 1 tablet by mouth nightly Yes Joann Cox MD   Misc Natural Products (GLUCOSAMINE CHOND CMP TRIPLE) TABS Take 2 tablets by mouth every evening Yes Historical Provider, MD   pantoprazole (PROTONIX) 20 MG tablet pantoprazole 20 mg tablet,delayed release Yes Joann Cox MD   lisinopril (PRINIVIL;ZESTRIL) 40 MG tablet Take 1 tablet by mouth daily Yes Joann Cox MD   amLODIPine (NORVASC) 10 MG tablet TAKE 1 TABLET BY MOUTH DAILY Yes Joann Cox MD   hydroCHLOROthiazide (HYDRODIURIL) 25 MG tablet Take 1 tablet by mouth every morning Yes Joann Cox MD   hydrOXYzine (ATARAX) 50 MG tablet Take 1 tablet by mouth nightly Yes Joann Cox MD   aMILoride (MIDAMOR) 5 MG tablet Take 1 tablet by mouth daily Yes Joann Cox MD   Calcium Carb-Cholecalciferol (CALCIUM 1000 + D) 1000-800 MG-UNIT TABS calcium   1 tablet by mouth once daily Yes Historical Provider, MD Manjeet Vail 500 MG CAPS krill oil   once daily Yes Historical Provider, MD   Multiple Vitamins-Minerals (MULTIVITAMIN ADULT EXTRA C PO) multivitamin   1 tablet by mouth once daily Yes Historical Provider, MD   zinc 50 MG CAPS zinc   1 tablet by mouth once daily Yes Historical Provider, MD   acetaminophen (TYLENOL) 500 MG tablet  Yes Historical Provider, MD   OXcarbazepine (TRILEPTAL) 600 MG tablet oxcarbazepine 600 mg tablet Yes Historical Provider, MD   buPROPion (WELLBUTRIN XL) 150 MG extended release tablet bupropion HCl  mg 24 hr tablet, extended release Yes Historical Provider, MD   buPROPion (WELLBUTRIN XL) 300 MG extended release tablet bupropion HCl  mg 24 hr tablet, extended release Yes Historical Provider, MD   FLUoxetine (PROZAC) 40 MG capsule fluoxetine 40 mg capsule Yes Historical Provider, MD   loratadine (CLARITIN) 10 MG tablet  Yes Historical Provider, MD   tamsulosin (FLOMAX) 0.4 MG capsule tamsulosin 0.4 mg capsule Yes Historical Provider, MD   Ascorbic Acid (VITAMIN C) 500 MG CAPS Take by mouth Yes Historical Provider, MD   Cholecalciferol (VITAMIN D3) 125 MCG (5000 UT) TABS Take by mouth Yes Historical Provider, MD   aspirin-acetaminophen-caffeine (EXCEDRIN MIGRAINE) 348-421-07 MG per tablet Take 1 tablet by mouth every 6 hours as needed for Headaches Yes Historical Provider, MD   Multiple Vitamins-Minerals (AIRBORNE PO) Take by mouth Yes Historical Provider, MD [place] (Including outside providers/suppliers regularly involved in providing care):   Patient Care Team:  Victor Hugo Vivas MD as PCP - General (Internal Medicine)  Victor Hugo Vivas MD as PCP - [facility] Empaneled Provider    Reviewed and updated this visit:  Tobacco  Allergies  Meds  Med Hx  Surg Hx  Soc Hx  Fam Hx                 Advance Care Planning   Advanced Care Planning: Discussed the patients choices for care and treatment in case of a health event that adversely affects decision-making abilities. Also discussed the patients long-term treatment options. Reviewed with the patient the appropriate state-specific advance directive documents. Reviewed the process of designating a competent adult as an Agent (or -in-fact) that could take make health care decisions for the patient if incompetent. Patient was asked to complete the declaration forms, either acknowledge the forms by a public notary or an eligible witness and provide a signed copy to the practice office. Time spent (minutes): 15     Obesity Counseling: Assessed behavioral health risks and factors affecting choice of behavior. Suggested weight control approaches, including dietary changes behavioral modification and follow up plan. Provided educational and support documentation.   Time spent (minutes): 15

## 2022-04-07 NOTE — PATIENT INSTRUCTIONS
Patient Education        Learning About Sleeping Well  What does sleeping well mean? Sleeping well means getting enough sleep to feel good and stay healthy. Howmuch sleep is enough varies among people. The number of hours you sleep and how you feel when you wake up are both important. If you do not feel refreshed, you probably need more sleep. Anothersign of not getting enough sleep is feeling tired during the day. Experts recommend that adults get at least 7 or more hours of sleep per day. Children and older adults need more sleep. Why is getting enough sleep important? Getting enough quality sleep is a basic part of good health. When your sleep suffers, your physical health, mood, and your thoughts can suffer too. You may find yourself feeling more grumpy or stressed. Not getting enough sleep also can lead to serious problems, including injury, accidents, anxiety, anddepression. What might cause poor sleeping? Many things can cause sleep problems, including:   Changes to your sleep schedule.  Stress. Stress can be caused by fear about a single event, such as giving a speech. Or you may have ongoing stress, such as worry about work or school.  Depression, anxiety, and other mental or emotional conditions.  Changes in your sleep habits or surroundings. This includes changes that happen where you sleep, such as noise, light, or sleeping in a different bed. It also includes changes in your sleep pattern, such as having jet lag or working a late shift.  Health problems, such as pain, breathing problems, and restless legs syndrome.  Lack of regular exercise.  Using alcohol, nicotine, or caffeine before bed. How can you help yourself? Here are some tips that may help you sleep more soundly and wake up feelingmore refreshed. Your sleeping area    Use your bedroom only for sleeping and sex. A bit of light reading may help you fall asleep. But if it doesn't, do your reading elsewhere in the house. Try not to use your TV, computer, smartphone, or tablet while you are in bed.  Be sure your bed is big enough to stretch out comfortably, especially if you have a sleep partner.  Keep your bedroom quiet, dark, and cool. Use curtains, blinds, or a sleep mask to block out light. To block out noise, use earplugs, soothing music, or a \"white noise\" machine. Your evening and bedtime routine    Create a relaxing bedtime routine. You might want to take a warm shower or bath, or listen to soothing music.  Go to bed at the same time every night. And get up at the same time every morning, even if you feel tired. What to avoid    Limit caffeine (coffee, tea, caffeinated sodas) during the day, and don't have any for at least 6 hours before bedtime.  Avoid drinking alcohol before bedtime. Alcohol can cause you to wake up more often during the night.  Try not to smoke or use tobacco, especially in the evening. Nicotine can keep you awake.  Limit naps during the day, especially close to bedtime.  Avoid lying in bed awake for too long. If you can't fall asleep or if you wake up in the middle of the night and can't get back to sleep within about 20 minutes, get out of bed and go to another room until you feel sleepy.  Avoid taking medicine right before bed that may keep you awake or make you feel hyper or energized. Your doctor can tell you if your medicine may do this and if you can take it earlier in the day. If you can't sleep    Imagine yourself in a peaceful, pleasant scene. Focus on the details and feelings of being in a place that is relaxing.  Get up and do a quiet or boring activity until you feel sleepy.  Avoid drinking any liquids before going to bed to help prevent waking up often to use the bathroom. Where can you learn more? Go to https://reggie.[employer]. org and sign in to your theScore account.  Enter K838 in the Consulting Services box to learn more about \"Learning About Sleeping Well. \"     If you do not have an account, please click on the \"Sign Up Now\" link. Current as of: June 16, 2021               Content Version: 13.2  © 2006-2022 Healthwise, Incorporated. Care instructions adapted under license by [facility] ([facility]). If you have questions about a medical condition or this instruction, always ask your healthcare professional. [address] any warranty or liability for your use of this information. Personalized Preventive Plan for Sandro Hoffman - 4/7/2022  Medicare offers a range of preventive health benefits. Some of the tests and screenings are paid in full while other may be subject to a deductible, co-insurance, and/or copay. Some of these benefits include a comprehensive review of your medical history including lifestyle, illnesses that may run in your family, and various assessments and screenings as appropriate. After reviewing your medical record and screening and assessments performed today your provider may have ordered immunizations, labs, imaging, and/or referrals for you. A list of these orders (if applicable) as well as your Preventive Care list are included within your After Visit Summary for your review. Other Preventive Recommendations:    · A preventive eye exam performed by an eye specialist is recommended every 1-2 years to screen for glaucoma; cataracts, macular degeneration, and other eye disorders. · A preventive dental visit is recommended every 6 months. · Try to get at least 150 minutes of exercise per week or 10,000 steps per day on a pedometer . · Order or download the FREE \"Exercise & Physical Activity: Your Everyday Guide\" from [employer]. Call 8-598.585.7502 or search [employer] online. · You need 4956-1032 mg of calcium and 7319-5686 IU of vitamin D per day.  It is possible to meet your calcium requirement with diet alone, but a vitamin D supplement is usually necessary to meet this goal.  · When exposed to the sun, use a sunscreen that protects against both UVA and UVB radiation with an SPF of 30 or greater. Reapply every 2 to 3 hours or after sweating, drying off with a towel, or swimming. · Always wear a seat belt when traveling in a car. Always wear a helmet when riding a bicycle or motorcycle. Heart-Healthy Diet   Sodium, Fat, and Cholesterol Controlled Diet       What Is a Heart Healthy Diet? A heart-healthy diet is one that limits sodium , certain types of fat , and cholesterol . This type of diet is recommended for:   People with any form of cardiovascular disease (eg, coronary heart disease , peripheral vascular disease , previous heart attack , previous stroke )   People with risk factors for cardiovascular disease, such as high blood pressure , high cholesterol , or diabetes   Anyone who wants to lower their risk of developing cardiovascular disease   Sodium    Sodium is a mineral found in many foods. In general, most people consume much more sodium than they need. Diets high in sodium can increase blood pressure and lead to edema (water retention). On a heart-healthy diet, you should consume no more than 2,300 mg (milligrams) of sodium per dayabout the amount in one teaspoon of table salt. The foods highest in sodium include table salt (about 50% sodium), processed foods, convenience foods, and preserved foods. Cholesterol    Cholesterol is a fat-like, waxy substance in your blood. Our bodies make some cholesterol. It is also found in animal products, with the highest amounts in fatty meat, egg yolks, whole milk, cheese, shellfish, and organ meats. On a heart-healthy diet, you should limit your cholesterol intake to less than 200 mg per day. It is normal and important to have some cholesterol in your bloodstream. But too much cholesterol can cause plaque to build up within your arteries, which can eventually lead to a heart attack or stroke.    The two types of cholesterol that are most commonly referred to are:   Low-density lipoprotein (LDL) cholesterol  Also known as bad cholesterol, this is the cholesterol that tends to build up along your arteries. Bad cholesterol levels are increased by eating fats that are saturated or hydrogenated. Optimal level of this cholesterol is less than 100. Over 130 starts to get risky for heart disease. High-density lipoprotein (HDL) cholesterol  Also known as good cholesterol, this type of cholesterol actually carries cholesterol away from your arteries and may, therefore, help lower your risk of having a heart attack. You want this level to be high (ideally greater than 60). It is a risk to have a level less than 40. You can raise this good cholesterol by eating olive oil, canola oil, avocados, or nuts. Exercise raises this level, too. Fat    Fat is calorie dense and packs a lot of calories into a small amount of food. Even though fats should be limited due to their high calorie content, not all fats are bad. In fact, some fats are quite healthful. Fat can be broken down into four main types.    The good-for-you fats are:   Monounsaturated fat  found in oils such as olive and canola, avocados, and nuts and natural nut butters; can decrease cholesterol levels, while keeping levels of HDL cholesterol high   Polyunsaturated fat  found in oils such as safflower, sunflower, soybean, corn, and sesame; can decrease total cholesterol and LDL cholesterol   Omega-3 fatty acids  particularly those found in fatty fish (such as salmon, trout, tuna, mackerel, herring, and sardines); can decrease risk of arrhythmias, decrease triglyceride levels, and slightly lower blood pressure   The fats that you want to limit are:   Saturated fat  found in animal products, many fast foods, and a few vegetables; increases total blood cholesterol, including LDL levels   Animal fats that are saturated include: butter, lard, whole-milk dairy products, eda, cheeses labeled as low-fat and low-sodium   Whole milk Reduced-fat (2%) milk Malted and chocolate milk Full fat yogurt Most cheeses (unless low-fat and low salt) Buttermilk (no more than 1 cup per week)   Meats and Beans   Lean cuts of fresh or frozen beef, veal, lamb, or pork (look for the word loin) Fresh or frozen poultry without the skin Fresh or frozen fish and some shellfish Egg whites and egg substitutes (Limit whole eggs to three per week) Tofu Nuts or seeds (unsalted, dry-roasted), low-sodium peanut butter Dried peas, beans, and lentils   Any smoked, cured, salted, or canned meat, fish, or poultry (including odonnell, chipped beef, cold cuts, hot dogs, sausages, sardines, and anchovies) Poultry skins Breaded and/or fried fish or meats Canned peas, beans, and lentils Salted nuts   Fats and Oils   Olive oil and canola oil Low-sodium, low-fat salad dressings and mayonnaise   Butter, margarine, coconut and palm oils, odonnell fat   Snacks, Sweets, and Condiments   Low-sodium or unsalted versions of broths, soups, soy sauce, and condiments Pepper, herbs, and spices; vinegar, lemon, or lime juice Low-fat frozen desserts (yogurt, sherbet, fruit bars) Sugar, cocoa powder, honey, syrup, jam, and preserves Low-fat, trans-fat free cookies, cakes, and pies Mark and animal crackers, fig bars, melly snaps   High-fat desserts Broth, soups, gravies, and sauces, made from instant mixes or other high-sodium ingredients Salted snack foods Canned olives Meat tenderizers, seasoning salt, and most flavored vinegars   Beverages   Low-sodium carbonated beverages Tea and coffee in moderation Soy milk   Commercially softened water   Suggestions   Make whole grains, fruits, and vegetables the base of your diet. Choose heart-healthy fats such as canola, olive, and flaxseed oil, and foods high in heart-healthy fats, such as nuts, seeds, soybeans, tofu, and fish.     Eat fish at least twice per week; the fish highest in omega-3 fatty acids and lowest in mercury include salmon, herring, mackerel, sardines, and canned chunk light tuna. If you eat fish less than twice per week or have high triglycerides, talk to your doctor about taking fish oil supplements. Read food labels. For products low in fat and cholesterol, look for fat free, low-fat, cholesterol free, saturated fat free, and trans fat freeAlso scan the Nutrition Facts Label, which lists saturated fat, trans fat, and cholesterol amounts. For products low in sodium, look for sodium free, very low sodium, low sodium, no added salt, and unsalted   Skip the salt when cooking or at the table; if food needs more flavor, get creative and try out different herbs and spices. Garlic and onion also add substantial flavor to foods. Trim any visible fat off meat and poultry before cooking, and drain the fat off after malone. Use cooking methods that require little or no added fat, such as grilling, boiling, baking, poaching, broiling, roasting, steaming, stir-frying, and sauting. Avoid fast food and convenience food. They tend to be high in saturated and trans fat and have a lot of added salt. Talk to a registered dietitian for individualized diet advice.       Last Reviewed: March 2011 Edita Rasmussen MS, MPH, RD   Updated: 3/29/2011   ·

## 2022-04-18 RX ORDER — LORATADINE 10 MG/1
10 TABLET ORAL DAILY
Qty: 90 TABLET | Refills: 1 | Status: SHIPPED | OUTPATIENT
Start: 2022-04-18 | End: 2022-08-08 | Stop reason: ALTCHOICE

## 2022-04-18 NOTE — TELEPHONE ENCOUNTER
Patient stopped in office asking if we can call in his Claritin 10 mg for him.  [facility]

## 2022-06-06 ENCOUNTER — HOSPITAL ENCOUNTER (OUTPATIENT)
Age: 52
Discharge: HOME | End: 2022-06-06
Payer: MEDICARE

## 2022-06-06 ENCOUNTER — HOSPITAL ENCOUNTER (OUTPATIENT)
Age: 52
Discharge: HOME | End: 2022-06-08
Payer: MEDICARE

## 2022-06-06 DIAGNOSIS — Z01.818 PRE-OP TESTING: ICD-10-CM

## 2022-06-06 LAB
ABSOLUTE EOS #: 0.1 K/UL (ref 0–0.44)
ABSOLUTE IMMATURE GRANULOCYTE: 0.03 K/UL (ref 0–0.3)
ABSOLUTE LYMPH #: 2.45 K/UL (ref 1.1–3.7)
ABSOLUTE MONO #: 0.59 K/UL (ref 0.1–1.2)
ANION GAP SERPL CALCULATED.3IONS-SCNC: 13 MMOL/L (ref 9–17)
BASOPHILS # BLD: 1 % (ref 0–2)
BASOPHILS ABSOLUTE: 0.04 K/UL (ref 0–0.2)
BUN BLDV-MCNC: 18 MG/DL (ref 6–20)
CALCIUM SERPL-MCNC: 9.6 MG/DL (ref 8.6–10.4)
CHLORIDE BLD-SCNC: 103 MMOL/L (ref 98–107)
CO2: 24 MMOL/L (ref 20–31)
CREAT SERPL-MCNC: 0.96 MG/DL (ref 0.7–1.2)
EOSINOPHILS RELATIVE PERCENT: 1 % (ref 1–4)
GFR AFRICAN AMERICAN: >60 ML/MIN
GFR NON-AFRICAN AMERICAN: >60 ML/MIN
GFR SERPL CREATININE-BSD FRML MDRD: NORMAL ML/MIN/{1.73_M2}
GLUCOSE BLD-MCNC: 74 MG/DL (ref 70–99)
HCT VFR BLD CALC: 45.4 % (ref 40.7–50.3)
HEMOGLOBIN: 15.6 G/DL (ref 13–17)
IMMATURE GRANULOCYTES: 0 %
LYMPHOCYTES # BLD: 33 % (ref 24–43)
MCH RBC QN AUTO: 30.7 PG (ref 25.2–33.5)
MCHC RBC AUTO-ENTMCNC: 34.4 G/DL (ref 28.4–34.8)
MCV RBC AUTO: 89.4 FL (ref 82.6–102.9)
MONOCYTES # BLD: 8 % (ref 3–12)
NRBC AUTOMATED: 0 PER 100 WBC
PDW BLD-RTO: 12.1 % (ref 11.8–14.4)
PLATELET # BLD: 281 K/UL (ref 138–453)
PMV BLD AUTO: 9.6 FL (ref 8.1–13.5)
POTASSIUM SERPL-SCNC: 3.7 MMOL/L (ref 3.7–5.3)
RBC # BLD: 5.08 M/UL (ref 4.21–5.77)
SEG NEUTROPHILS: 57 % (ref 36–65)
SEGMENTED NEUTROPHILS ABSOLUTE COUNT: 4.23 K/UL (ref 1.5–8.1)
SODIUM BLD-SCNC: 140 MMOL/L (ref 135–144)
WBC # BLD: 7.4 K/UL (ref 3.5–11.3)

## 2022-06-06 PROCEDURE — 36415 COLL VENOUS BLD VENIPUNCTURE: CPT

## 2022-06-06 PROCEDURE — 93005 ELECTROCARDIOGRAM TRACING: CPT | Performed by: PLASTIC SURGERY

## 2022-06-06 PROCEDURE — 85025 COMPLETE CBC W/AUTO DIFF WBC: CPT

## 2022-06-06 PROCEDURE — 80048 BASIC METABOLIC PNL TOTAL CA: CPT

## 2022-06-06 NOTE — PROGRESS NOTES
Preoperative Instructions:    Stop eating solid foods at midnight the night prior to your surgery. Stop drinking clear liquids at midnight the night prior to your surgery. Arrive at the surgery center (3rd entrance) on _____[phone]__________ by _1000______________. Please stop any blood thinning medications as directed by your surgeon or prescribing physician. Failure to stop certain medications may interfere with your scheduled surgery. These may include: Aspirin, Coumadin, Plavix, NSAIDS (Motrin, Aleve, Advil, Mobic, Celebrex), Eliquis, Pradaxa, Xarelto, Fish oil, and herbal supplements. HOLD MULTIVITAMIN and Krill oil , Glucosamine as per surgeons recommendations. You may continue the rest of your medications through the night before surgery unless instructed otherwise. Day of surgery please take only the following medication(s) with a small sip of water:Norvasc, Wellbutrin, Lisinopril, Trileptal. Protonix      Please use and bring inhalers the day of surgery. Reminders:  -If you are going home the day of your procedure, you will need a family member or friend to stay during the procedure and drive you home after your procedure. Your  must be 25years of age or older and able to sign off on your discharge instructions.    -If you are going home the same day of your surgery, someone must remain with you for the first 24 hours after your surgery if you receive sedation or anesthesia.      -Please do not wear any jewelery or body piercing the day of surgery

## 2022-06-07 ENCOUNTER — ANESTHESIA EVENT (OUTPATIENT)
Dept: OPERATING ROOM | Age: 52
End: 2022-06-07
Payer: MEDICARE

## 2022-06-07 LAB
EKG ATRIAL RATE: 63 BPM
EKG P AXIS: 23 DEGREES
EKG P-R INTERVAL: 158 MS
EKG Q-T INTERVAL: 418 MS
EKG QRS DURATION: 96 MS
EKG QTC CALCULATION (BAZETT): 427 MS
EKG R AXIS: 35 DEGREES
EKG T AXIS: 39 DEGREES
EKG VENTRICULAR RATE: 63 BPM

## 2022-06-08 NOTE — TELEPHONE ENCOUNTER
Zaynab Gains  The patient LVM confirming his surgery start time is 10am, with arrival time of 8:30am

## 2022-06-10 ENCOUNTER — ANESTHESIA (OUTPATIENT)
Dept: OPERATING ROOM | Age: 52
End: 2022-06-10
Payer: MEDICARE

## 2022-06-10 ENCOUNTER — HOSPITAL ENCOUNTER (OUTPATIENT)
Age: 52
Setting detail: OUTPATIENT SURGERY
Discharge: HOME | End: 2022-06-10
Attending: PLASTIC SURGERY | Admitting: PLASTIC SURGERY
Payer: MEDICARE

## 2022-06-10 VITALS
SYSTOLIC BLOOD PRESSURE: 104 MMHG | WEIGHT: 202.25 LBS | RESPIRATION RATE: 14 BRPM | TEMPERATURE: 96.7 F | HEART RATE: 74 BPM | DIASTOLIC BLOOD PRESSURE: 73 MMHG | BODY MASS INDEX: 28.95 KG/M2 | HEIGHT: 70 IN | OXYGEN SATURATION: 99 %

## 2022-06-10 DIAGNOSIS — R22.0 SCALP MASS: ICD-10-CM

## 2022-06-10 DIAGNOSIS — L08.9 INFECTED CYST OF SKIN: ICD-10-CM

## 2022-06-10 DIAGNOSIS — Z01.818 PRE-OP TESTING: Primary | ICD-10-CM

## 2022-06-10 DIAGNOSIS — L72.9 INFECTED CYST OF SKIN: ICD-10-CM

## 2022-06-10 PROCEDURE — 3600000002 HC SURGERY LEVEL 2 BASE: Performed by: PLASTIC SURGERY

## 2022-06-10 PROCEDURE — 7100000010 HC PHASE II RECOVERY - FIRST 15 MIN: Performed by: PLASTIC SURGERY

## 2022-06-10 PROCEDURE — 6360000002 HC RX W HCPCS: Performed by: NURSE ANESTHETIST, CERTIFIED REGISTERED

## 2022-06-10 PROCEDURE — 2500000003 HC RX 250 WO HCPCS: Performed by: PLASTIC SURGERY

## 2022-06-10 PROCEDURE — 7100000011 HC PHASE II RECOVERY - ADDTL 15 MIN: Performed by: PLASTIC SURGERY

## 2022-06-10 PROCEDURE — 3600000012 HC SURGERY LEVEL 2 ADDTL 15MIN: Performed by: PLASTIC SURGERY

## 2022-06-10 PROCEDURE — 6370000000 HC RX 637 (ALT 250 FOR IP): Performed by: PLASTIC SURGERY

## 2022-06-10 PROCEDURE — 13121 CMPLX RPR S/A/L 2.6-7.5 CM: CPT | Performed by: PLASTIC SURGERY

## 2022-06-10 PROCEDURE — 7100000000 HC PACU RECOVERY - FIRST 15 MIN: Performed by: PLASTIC SURGERY

## 2022-06-10 PROCEDURE — 2580000003 HC RX 258: Performed by: NURSE ANESTHETIST, CERTIFIED REGISTERED

## 2022-06-10 PROCEDURE — 3700000000 HC ANESTHESIA ATTENDED CARE: Performed by: PLASTIC SURGERY

## 2022-06-10 PROCEDURE — 88304 TISSUE EXAM BY PATHOLOGIST: CPT

## 2022-06-10 PROCEDURE — 2580000003 HC RX 258: Performed by: ANESTHESIOLOGY

## 2022-06-10 PROCEDURE — 7100000001 HC PACU RECOVERY - ADDTL 15 MIN: Performed by: PLASTIC SURGERY

## 2022-06-10 PROCEDURE — 2500000003 HC RX 250 WO HCPCS: Performed by: NURSE ANESTHETIST, CERTIFIED REGISTERED

## 2022-06-10 PROCEDURE — A4216 STERILE WATER/SALINE, 10 ML: HCPCS | Performed by: NURSE ANESTHETIST, CERTIFIED REGISTERED

## 2022-06-10 PROCEDURE — 11423 EXC H-F-NK-SP B9+MARG 2.1-3: CPT | Performed by: PLASTIC SURGERY

## 2022-06-10 PROCEDURE — 2709999900 HC NON-CHARGEABLE SUPPLY: Performed by: PLASTIC SURGERY

## 2022-06-10 PROCEDURE — 3700000001 HC ADD 15 MINUTES (ANESTHESIA): Performed by: PLASTIC SURGERY

## 2022-06-10 RX ORDER — SODIUM CHLORIDE 9 MG/ML
25 INJECTION, SOLUTION INTRAVENOUS PRN
Status: DISCONTINUED | OUTPATIENT
Start: 2022-06-10 | End: 2022-06-10 | Stop reason: HOSPADM

## 2022-06-10 RX ORDER — GINSENG 100 MG
CAPSULE ORAL PRN
Status: DISCONTINUED | OUTPATIENT
Start: 2022-06-10 | End: 2022-06-10 | Stop reason: ALTCHOICE

## 2022-06-10 RX ORDER — SODIUM CHLORIDE, SODIUM LACTATE, POTASSIUM CHLORIDE, CALCIUM CHLORIDE 600; 310; 30; 20 MG/100ML; MG/100ML; MG/100ML; MG/100ML
INJECTION, SOLUTION INTRAVENOUS CONTINUOUS
Status: DISCONTINUED | OUTPATIENT
Start: 2022-06-10 | End: 2022-06-10 | Stop reason: HOSPADM

## 2022-06-10 RX ORDER — MEPERIDINE HYDROCHLORIDE 50 MG/ML
12.5 INJECTION INTRAMUSCULAR; INTRAVENOUS; SUBCUTANEOUS ONCE
Status: DISCONTINUED | OUTPATIENT
Start: 2022-06-10 | End: 2022-06-10 | Stop reason: HOSPADM

## 2022-06-10 RX ORDER — SODIUM CHLORIDE 9 MG/ML
INJECTION INTRAVENOUS PRN
Status: DISCONTINUED | OUTPATIENT
Start: 2022-06-10 | End: 2022-06-10 | Stop reason: SDUPTHER

## 2022-06-10 RX ORDER — DEXAMETHASONE SODIUM PHOSPHATE 10 MG/ML
INJECTION, SOLUTION INTRAMUSCULAR; INTRAVENOUS PRN
Status: DISCONTINUED | OUTPATIENT
Start: 2022-06-10 | End: 2022-06-10 | Stop reason: SDUPTHER

## 2022-06-10 RX ORDER — CEFAZOLIN SODIUM 2 G/50ML
SOLUTION INTRAVENOUS PRN
Status: DISCONTINUED | OUTPATIENT
Start: 2022-06-10 | End: 2022-06-10 | Stop reason: SDUPTHER

## 2022-06-10 RX ORDER — SODIUM CHLORIDE 0.9 % (FLUSH) 0.9 %
5-40 SYRINGE (ML) INJECTION PRN
Status: DISCONTINUED | OUTPATIENT
Start: 2022-06-10 | End: 2022-06-10 | Stop reason: HOSPADM

## 2022-06-10 RX ORDER — MIDAZOLAM HYDROCHLORIDE 1 MG/ML
INJECTION INTRAMUSCULAR; INTRAVENOUS PRN
Status: DISCONTINUED | OUTPATIENT
Start: 2022-06-10 | End: 2022-06-10 | Stop reason: SDUPTHER

## 2022-06-10 RX ORDER — GLYCOPYRROLATE 1 MG/5 ML
SYRINGE (ML) INTRAVENOUS PRN
Status: DISCONTINUED | OUTPATIENT
Start: 2022-06-10 | End: 2022-06-10 | Stop reason: SDUPTHER

## 2022-06-10 RX ORDER — PHENYLEPHRINE HYDROCHLORIDE 10 MG/ML
INJECTION INTRAVENOUS PRN
Status: DISCONTINUED | OUTPATIENT
Start: 2022-06-10 | End: 2022-06-10 | Stop reason: SDUPTHER

## 2022-06-10 RX ORDER — MORPHINE SULFATE 1 MG/ML
1 INJECTION, SOLUTION EPIDURAL; INTRATHECAL; INTRAVENOUS EVERY 5 MIN PRN
Status: DISCONTINUED | OUTPATIENT
Start: 2022-06-10 | End: 2022-06-10 | Stop reason: HOSPADM

## 2022-06-10 RX ORDER — LIDOCAINE HYDROCHLORIDE 10 MG/ML
INJECTION, SOLUTION EPIDURAL; INFILTRATION; INTRACAUDAL; PERINEURAL PRN
Status: DISCONTINUED | OUTPATIENT
Start: 2022-06-10 | End: 2022-06-10 | Stop reason: SDUPTHER

## 2022-06-10 RX ORDER — SODIUM CHLORIDE 0.9 % (FLUSH) 0.9 %
5-40 SYRINGE (ML) INJECTION EVERY 12 HOURS SCHEDULED
Status: DISCONTINUED | OUTPATIENT
Start: 2022-06-10 | End: 2022-06-10 | Stop reason: HOSPADM

## 2022-06-10 RX ORDER — CEPHALEXIN 500 MG/1
500 CAPSULE ORAL 4 TIMES DAILY
Qty: 40 CAPSULE | Refills: 0 | Status: SHIPPED | OUTPATIENT
Start: 2022-06-10 | End: 2022-06-20

## 2022-06-10 RX ORDER — FENTANYL CITRATE 50 UG/ML
INJECTION, SOLUTION INTRAMUSCULAR; INTRAVENOUS PRN
Status: DISCONTINUED | OUTPATIENT
Start: 2022-06-10 | End: 2022-06-10 | Stop reason: SDUPTHER

## 2022-06-10 RX ORDER — LIDOCAINE HYDROCHLORIDE AND EPINEPHRINE 10; 10 MG/ML; UG/ML
INJECTION, SOLUTION INFILTRATION; PERINEURAL PRN
Status: DISCONTINUED | OUTPATIENT
Start: 2022-06-10 | End: 2022-06-10 | Stop reason: ALTCHOICE

## 2022-06-10 RX ORDER — SODIUM CHLORIDE 9 MG/ML
INJECTION, SOLUTION INTRAVENOUS PRN
Status: DISCONTINUED | OUTPATIENT
Start: 2022-06-10 | End: 2022-06-10 | Stop reason: HOSPADM

## 2022-06-10 RX ORDER — ONDANSETRON 2 MG/ML
INJECTION INTRAMUSCULAR; INTRAVENOUS PRN
Status: DISCONTINUED | OUTPATIENT
Start: 2022-06-10 | End: 2022-06-10 | Stop reason: SDUPTHER

## 2022-06-10 RX ORDER — ROCURONIUM BROMIDE 10 MG/ML
INJECTION, SOLUTION INTRAVENOUS PRN
Status: DISCONTINUED | OUTPATIENT
Start: 2022-06-10 | End: 2022-06-10 | Stop reason: SDUPTHER

## 2022-06-10 RX ORDER — ONDANSETRON 2 MG/ML
4 INJECTION INTRAMUSCULAR; INTRAVENOUS
Status: DISCONTINUED | OUTPATIENT
Start: 2022-06-10 | End: 2022-06-10 | Stop reason: HOSPADM

## 2022-06-10 RX ORDER — HYDROCODONE BITARTRATE AND ACETAMINOPHEN 5; 325 MG/1; MG/1
1 TABLET ORAL EVERY 4 HOURS PRN
Qty: 18 TABLET | Refills: 0 | Status: SHIPPED | OUTPATIENT
Start: 2022-06-10 | End: 2022-06-13

## 2022-06-10 RX ORDER — DIPHENHYDRAMINE HYDROCHLORIDE 50 MG/ML
12.5 INJECTION INTRAMUSCULAR; INTRAVENOUS
Status: DISCONTINUED | OUTPATIENT
Start: 2022-06-10 | End: 2022-06-10 | Stop reason: HOSPADM

## 2022-06-10 RX ORDER — NEOSTIGMINE METHYLSULFATE 5 MG/5 ML
SYRINGE (ML) INTRAVENOUS PRN
Status: DISCONTINUED | OUTPATIENT
Start: 2022-06-10 | End: 2022-06-10 | Stop reason: SDUPTHER

## 2022-06-10 RX ORDER — SODIUM CHLORIDE 9 MG/ML
INJECTION, SOLUTION INTRAVENOUS CONTINUOUS
Status: DISCONTINUED | OUTPATIENT
Start: 2022-06-10 | End: 2022-06-10 | Stop reason: HOSPADM

## 2022-06-10 RX ADMIN — PHENYLEPHRINE HYDROCHLORIDE 100 MCG: 10 INJECTION INTRAVENOUS at 10:06

## 2022-06-10 RX ADMIN — Medication 0.4 MG: at 10:10

## 2022-06-10 RX ADMIN — PHENYLEPHRINE HYDROCHLORIDE 100 MCG: 10 INJECTION INTRAVENOUS at 10:02

## 2022-06-10 RX ADMIN — SODIUM CHLORIDE, POTASSIUM CHLORIDE, SODIUM LACTATE AND CALCIUM CHLORIDE: 600; 310; 30; 20 INJECTION, SOLUTION INTRAVENOUS at 08:44

## 2022-06-10 RX ADMIN — SODIUM CHLORIDE 1 ML: 9 INJECTION, SOLUTION INTRAMUSCULAR; INTRAVENOUS; SUBCUTANEOUS at 09:50

## 2022-06-10 RX ADMIN — ROCURONIUM BROMIDE 40 MG: 10 INJECTION INTRAVENOUS at 09:38

## 2022-06-10 RX ADMIN — CEFAZOLIN SODIUM 2000 MG: 2 SOLUTION INTRAVENOUS at 09:47

## 2022-06-10 RX ADMIN — Medication 0.2 MG: at 09:58

## 2022-06-10 RX ADMIN — LIDOCAINE HYDROCHLORIDE 50 MG: 10 INJECTION, SOLUTION EPIDURAL; INFILTRATION; INTRACAUDAL; PERINEURAL at 09:38

## 2022-06-10 RX ADMIN — MIDAZOLAM HYDROCHLORIDE 2 MG: 1 INJECTION, SOLUTION INTRAMUSCULAR; INTRAVENOUS at 09:38

## 2022-06-10 RX ADMIN — DEXAMETHASONE SODIUM PHOSPHATE 10 MG: 10 INJECTION INTRAMUSCULAR; INTRAVENOUS at 09:48

## 2022-06-10 RX ADMIN — ONDANSETRON 4 MG: 2 INJECTION INTRAMUSCULAR; INTRAVENOUS at 09:52

## 2022-06-10 RX ADMIN — FENTANYL CITRATE 100 MCG: 50 INJECTION, SOLUTION INTRAMUSCULAR; INTRAVENOUS at 09:38

## 2022-06-10 RX ADMIN — PHENYLEPHRINE HYDROCHLORIDE 100 MCG: 10 INJECTION INTRAVENOUS at 09:51

## 2022-06-10 RX ADMIN — Medication 2 MG: at 10:10

## 2022-06-10 RX ADMIN — PHENYLEPHRINE HYDROCHLORIDE 200 MCG: 10 INJECTION INTRAVENOUS at 09:57

## 2022-06-10 ASSESSMENT — PAIN - FUNCTIONAL ASSESSMENT: PAIN_FUNCTIONAL_ASSESSMENT: NONE - DENIES PAIN

## 2022-06-10 NOTE — H&P
[address] [facility]  [facility] [id number]       History and Physical     No chief complaint on file. HPI:   Oswaldo Baca is a 46 y.o. male who presents with a cyst of the posterior scalp that has been present for several years. It continues to fill with pus and patient drained it. It causes patient discomfort specially when it is filled with pus. Patient is here for evaluation treatment. Patient states there is nothing that improves it. Medications:     Current Facility-Administered Medications   Medication Dose Route Frequency Provider Last Rate Last Admin    0.9 % sodium chloride infusion   IntraVENous Continuous Desiree Lin MD        lactated ringers infusion   IntraVENous Continuous Desiree Lin  mL/hr at 06/10/22 0844 New Bag at 06/10/22 0844    sodium chloride flush 0.9 % injection 5-40 mL  5-40 mL IntraVENous 2 times per day Desiree Lin MD        sodium chloride flush 0.9 % injection 5-40 mL  5-40 mL IntraVENous PRN Desiree Lin MD        0.9 % sodium chloride infusion   IntraVENous PRN Desiree Lin MD        ceFAZolin (ANCEF) IVPB               Allergies: Allergies   Allergen Reactions    No Known Allergies      Review of Systems:   Constitutional: Negative for fever, chills, fatigue and unexpected weight change. HENT: Negative for hearing loss, sore throat and facial swelling. Eyes: Negative for pain and discharge. Respiratory: Negative for cough and shortness of breath. Cardiovascular: Patient has a history of hyperlipidemia and hypertension  Gastrointestinal: Patient has a history of internal hemorrhoids and diverticulosis. Skin: Negative for pallor and rash. Neurological: Negative for seizures, syncope and numbness. Hematological: Does not bruise/bleed easily. Psychiatric/Behavioral: Negative for behavioral problems. Patient has a history of bipolar disorder.     Past Medical History:   Diagnosis Date    Arthritis     lumbar stenosis    Bipolar disorder ([facility]     carlene melendez NP, and romeo oquendo- psych services    BPH (benign prostatic hyperplasia)     Cancer ([facility]     kidney    Diverticulosis     colonoscopy 10/2012    GERD (gastroesophageal reflux disease)     HLD (hyperlipidemia)     HTN (hypertension)     Internal hemorrhoid     colonoscopy 10/2012    SCTO (obstructive sleep apnea)      Past Surgical History:   Procedure Laterality Date    COLONOSCOPY  10/2012    KIDNEY SURGERY      removed cancer spot    KNEE SURGERY      torn meniscus     Social History     Socioeconomic History    Marital status:      Spouse name: Not on file    Number of children: Not on file    Years of education: Not on file    Highest education level: Not on file   Occupational History    Not on file   Tobacco Use    Smoking status: Former Smoker     Packs/day: 1.00     Years: 15.00     Pack years: 15.00     Types: Cigarettes     Quit date: 7/5/2018     Years since quitting: 3.9    Smokeless tobacco: Never Used   Vaping Use    Vaping Use: Never used   Substance and Sexual Activity    Alcohol use: Not Currently     Comment: not for 3 weeks    Drug use: Not Currently     Types: Marijuana Meliza Dasen)    Sexual activity: Not on file   Other Topics Concern    Not on file   Social History Narrative    Not on file     Social Determinants of Health     Financial Resource Strain: Low Risk     Difficulty of Paying Living Expenses: Not hard at all   Food Insecurity: No Food Insecurity    Worried About Running Out of Food in the Last Year: Never true    Estefania of Food in the Last Year: Never true   Transportation Needs:     Lack of Transportation (Medical): Not on file    Lack of Transportation (Non-Medical):  Not on file   Physical Activity: Inactive    Days of Exercise per Week: 0 days    Minutes of Exercise per Session: 0 min   Stress:     Feeling of Stress : Not on file   Social Connections:     Frequency of Communication with Friends and Family: Not on file    Frequency of Social Gatherings with Friends and Family: Not on file    Attends Religion Services: Not on file    Active Member of Clubs or Organizations: Not on file    Attends Club or Organization Meetings: Not on file    Marital Status: Not on file   Intimate Partner Violence: Not At Risk    Fear of Current or Ex-Partner: No    Emotionally Abused: No    Physically Abused: No    Sexually Abused: No   Housing Stability:     Unable to Pay for Housing in the Last Year: Not on file    Number of [place] in the Last Year: Not on file    Unstable Housing in the Last Year: Not on file     History reviewed. No pertinent family history. Physical Exam:   /81   Pulse 72   Temp (!) 96.5 °F (35.8 °C) (Temporal)   Resp 18   Ht 5' 10\" (1.778 m)   Wt 202 lb 4 oz (91.7 kg)   SpO2 94%   BMI 29.02 kg/m²    Body mass index is 29.02 kg/m². Physical Exam   Nursing note and vitals reviewed. Constitutional: Oriented to person, place, and time. Appears well-developed and well-nourished. No distress. Head: Normocephalic and atraumatic. Eyes: Conjunctivae and EOM are normal.   Pulmonary/Chest: Effort normal. No respiratory distress. Neurological: Alert and oriented to person, place, and time. Skin:       Left posterior scalp cyst  Psychiatric: Normal mood and affect. Behavior is normal    Imaging:   [unfilled]        Impression/Plan:      Diagnosis Orders   1.  Scalp cyst  US SOFT TISSUE LIMITED AREA     Patient Active Problem List   Diagnosis    Bipolar disorder ([address]    HTN (hypertension)    Change in bowel habits    Diverticulosis    Internal hemorrhoid    Mixed hyperlipidemia    Bipolar disorder ([address]    Diabetes insipidus ([address]    Spinal stenosis of lumbar region    Primary insomnia    History of renal cell carcinoma    Scalp cyst    Restless leg    Gastroesophageal reflux disease without esophagitis       Plan:  We discussed excision of the cyst and the rest of the associated including but not limited to infection bleeding scarring alopecia in the area of the scar which she currently has, and as well as damage to deeper structures such as nerves and vessels. We also discussed possible scalp numbness. Patient demonstrated understanding. All questions were answered. We will obtain an ultrasound of the area prior to any surgical intervention. The following information was discussed with the patient, but this is not an all-inclusive-other issue were also discussed with patient. GENERAL INFORMATION  The surgical removal of skin lesions and tumors is frequently performed by plastic surgeons. Certain skin lesions and skin tumors will not disappear spontaneously, surgical removal is a treatment option. There are many different techniques for removing skin lesions and skin tumors. ALTERNATIVE TREATMENTS  Alternative forms of management consist of not treating the skin lesion/skin tumor condition. Removal of skin lesions and some superficial skin tumors may be accomplished by other treatments including the use of liquid nitrogen (freezing), lasers, topical medications, and electric cautery. Risks and potential complications are associated with alternative forms of treatment. Deeper tumors cannot be treated by this. RISKS OF SKIN LESION/SKIN TUMOR SURGERY    I have explained to the patient that every surgical procedure involves a certain amount of risk and it is important that an understanding of these risks and the possible complications associated with them is understood. In addition, every procedure has limitations. An individual's choice to undergo a surgical procedure is based on the comparison of the risk to potential benefit. Although some of patients do not experience these complications.     Bleeding- It is possible, to experience a bleeding episode during or after surgery. Intraoperative blood transfusions may be required. Should post-operative bleeding occur, it may require an emergency treatment to drain the accumulated blood or blood transfusion. Do not take any aspirin or anti-inflammatory medications for ten days before or after surgery, as this may increase the risk of bleeding. Non-prescription herbs and dietary supplements can increase the risk of surgical bleeding. Hematoma can occur at any time following injury. If blood transfusions are necessary to treat blood loss, there is the risk of blood-related infections such as hepatitis and HIV (AIDS). Heparin medications that are used to prevent blood clots in veins can produce bleeding and decreased blood platelets. Please check with the physician who prescribed that blood thinners such as aspirin Coumadin etc. Prior to stopping them. Infection- Infection can occur after surgery. Should an infection occur, additional treatment including antibiotics, hospitalization, or additional surgery may be necessary. Scarring- All surgery leaves scars, some more visible than others. Although wound healing after a surgical procedure is expected, abnormal scars may occur within the skin and deeper tissues. Scars may be unattractive and of different color than the surrounding skin tone. Scar appearance may also vary within the same scar. There is the possibility of visible marks from sutures used to close the wound after the removal of skin lesions and tumors. There is the possibility that scars may limit motion and function. In some cases, scars may require surgical revision or treatment. Obesity: If you're BMI is greater than 30 you may have a higher chance of complications. This may include but not limited to wound healing and infections. Also, if you have other medical problems such as diabetes and hypertension it may affect your healing as well as your surgical results in bleeding.   Damage to Deeper Structures- There is the potential for injury to deeper structures including nerves, blood vessels, muscles, and lungs (pneumothorax) during any surgical procedure. The potential for this to occur varies according to where on the body surgery is being performed. Injury to deeper structures may be temporary or permanent. Cancer- In some situations, a skin lesion or tumor that appears to be benign may be determined to be cancerous after laboratory analysis. Additional treatments or surgery may be necessary. Recurrence- In some situation, skin lesions and tumors can recur after surgical excision. Additional treatment or secondary surgery may be necessary. Skin Discoloration / Swelling- Some bruising and swelling normally occurs following surgery. The skin in or near the surgical site can appear either lighter or darker than surrounding skin. Although uncommon, swelling and skin discoloration may persist for long periods of time and, in rare situations, may be permanent. Skin Sensitivity- Itching, tenderness, or exaggerated responses to hot or cold temperatures may occur after surgery. Usually this resolves during healing, but in some situations, it may be chronic, permanent. Pain- You will experience pain after your surgery. Pain of varying intensity and duration may occur and persist after surgery. Chronic pain may occur from nerves becoming trapped in scar tissue. Sutures- Some surgical techniques use deep sutures. You may notice these sutures after your surgery. Sutures may spontaneously poke through the skin, become visible or produce irritation that requires removal.  Delayed Healing- Wound disruption or delayed wound healing is possible. Some areas of the skin may not heal normally and may take a long time to heal.  Some areas of skin may die, requiring frequent dressing changes or further surgery to remove the non-healed tissue.   Smokers have a greater risk of skin loss and wound healing complications. Skin Contour Irregularities- Contour irregularities and depressions may occur after surgery. Visible and palpable wrinkling of skin can occur. Residual skin irregularities at the ends of the incisions or dog ears are always a possibility and may require additional surgery. This may improve with time, or it can be surgically corrected. Allergic Reactions- In some cases, local allergies to tape, suture materials and glues, blood products, topical preparations or injected agents have been reported. Serious systemic reactions including shock (anaphylaxis) may occur to drugs used during surgery and prescription medications. Allergic reactions may require additional treatment. Surgical Anesthesia- Both local and general anesthesia involve risk. There is the possibility of complications, injury, and even death from all forms of surgical anesthesia or sedation. Change in Skin Sensation- It is common to experience diminished (or loss) of skin sensation in areas that have had surgery. Diminished (or complete loss of skin sensation) may not totally resolve. Shock- In rare circumstances, your surgical procedure can cause severe trauma, particularly when multiple or extensive procedures are performed. Although serious complications are infrequent, infections or excessive fluid loss can lead to severe illness and even death. If surgical shock occurs, hospitalization and additional treatment would be necessary. Unsatisfactory Result- There is no guarantee or warranty expressed or implied, on the results that may be obtained. There is the possibility of a poor result from the removal of skin lesions and tumors. This would include risks such as unacceptable visible deformities, loss of function, poor healing, wound disruption, skin death and loss of sensation. You may be disappointed with the results of reconstructive surgery.  It may be necessary to perform additional surgery to improve your Surgery- It is important that all patients seeking to undergo surgery have realistic expectations that focus on improvement rather than perfection. Complications or less than satisfactory results are sometimes unavoidable, may require additional surgery and often are stressful. Please openly discuss with your surgeon, prior to surgery, any history that you may have of significant emotional depression or mental health disorders. Although many individuals may benefit psychologically from the results of surgery, effects on mental health cannot be accurately predicted. Medications- There are many adverse reactions that occur as the result of taking over the counter, herbal, and/or prescription medications. Be sure to check with your physician about any drug interactions that may exist with medications which you are already taking. If you have an adverse reaction, stop the drugs immediately and call your plastic surgeon for further instructions. If the reaction is severe, go immediately to the nearest emergency room. When taking the prescribed pain medications after surgery, realize that they can affect your thought process and coordination. Do not drive, do not operate complex equipment, do not make any important decisions, and do not drink any alcohol while taking these medications. Be sure to take your prescribed medication only as directed. ADDITIONAL SURGERY NECESSARY  Should complications occur, additional surgery or other treatments may be necessary. Even though risks and complications occur infrequently, the risks cited are the ones that are particularly associated with skin lesion and skin tumor removal.  Other complications and risks can occur but are even more uncommon. The practice of medicine and surgery is not an exact science. Although good results are expected, there is no guarantee or warranty expressed or implied, on the results that may be obtained.   PATIENT COMPLIANCE   Follow all physician instructions carefully; this is essential for the success of your outcome. It is important that the surgical incisions are not subjected to excessive force, swelling, abrasion, or motion during the time of healing. Protective dressings and drains should not be removed unless instructed by your plastic surgeon. Successful post-operative function depends on both surgery and subsequent care. Physical activity that increases your pulse or heart rate may cause bruising, swelling, fluid accumulation and the need for return to surgery. It is wise to refrain from intimate physical activities after surgery until your physician states it is safe. It is important that you participate in follow-up care, return for aftercare, and promote your recovery after surgery. HEALTH INSURANCE  Most health insurance companies exclude coverage for cosmetic surgical operations or any complications that might occur from surgery. Please carefully review your health insurance subscriber information pamphlet. Most insurance plans exclude coverage for secondary or revisionary surgery. Your type of surgery will most likely be covered by your insurance company, I went there is no guarantees or warrantees implied or otherwise. The cost of surgery involves several charges for the services provided. The total includes fees charged by your surgeon, the cost of surgical supplies, anesthesia, laboratory tests, and possible outpatient hospital charges, depending on where the surgery is performed. Depending on whether the cost of surgery is covered by an insurance plan, you will be responsible for necessary co-payments, deductibles, and charges not covered. The fees charged for this procedure do not include any potential future costs for additional procedures that you elect to have or require in order to revise, optimize, or complete your outcome. Additional costs may occur should complications develop from the surgery. Secondary surgery or hospital day-surgery charges involved with revision surgery will also be your responsibility. II have also explained to the patient that we may obtain photographs. These images or illustration along with my medical records created in my case may be used for obtaining insurance approval, for use in examination, may assist in education, testing, credentialing and or certifying by [facility]. These images and records may be used to communicate with referring physician.   Electronically signed by:  Imani Munguia MD 6/10/2022

## 2022-06-10 NOTE — ANESTHESIA POSTPROCEDURE EVALUATION
POST- ANESTHESIA EVALUATION       Pt Name: Ambrose Cuevas  MRN: [id number]  [place]: 1970  Date of evaluation: 6/10/2022  Time:  11:22 AM      /73   Pulse 74   Temp (!) 96.7 °F (35.9 °C) (Temporal)   Resp 14   Ht 5' 10\" (1.778 m)   Wt 202 lb 4 oz (91.7 kg)   SpO2 99%   BMI 29.02 kg/m²      Consciousness Level  Awake  Cardiopulmonary Status  Stable  Pain Adequately Treated YES  Nausea / Vomiting  NO  Adequate Hydration  YES  Anesthesia Related Complications NONE      Electronically signed by Logan Khan MD on 6/10/2022 at [facility]       Department of Anesthesiology  Postprocedure Note    Patient: Ambrose Cuevas  MRN: [id number]  [place]: 1970  Date of evaluation: 6/10/2022  Time:  11:22 AM     Procedure Summary     Date: 06/10/22 Room / Location: [address]    Anesthesia Start: 3174 Anesthesia Stop: 3636    Procedure: POSTERIOR SCALP CYSTIC MASS EXCISION WITH COMPLEX CLOSURE (N/A Head) Diagnosis:       Scalp mass      (CYSTIC MASS)    Surgeons: Riri Becker MD Responsible Provider: Logan Khan MD    Anesthesia Type: MAC, general ASA Status: 2          Anesthesia Type: No value filed. Salvador Phase I: Salvador Score: 10    Salvador Phase II: Salvador Score: 10    Last vitals: Reviewed and per EMR flowsheets.        Anesthesia Post Evaluation

## 2022-06-10 NOTE — OP NOTE
Operative Note      Patient: Jose Hutchinson  YOB: 1970  MRN: [id number]    Date of Procedure: 6/10/2022    Pre-Op Diagnosis: CYSTIC MASS    Post-Op Diagnosis: Same left posterior       Procedure(s):  POSTERIOR SCALP CYSTIC MASS EXCISION MEASURING 2.5 X 1.7 CM WITH COMPLEX CLOSURE measuring 2.7 x 1.8 cm    Surgeon(s):  Joseph Amanda MD    Assistant:   * No surgical staff found *    Anesthesia: Monitor Anesthesia Care    Estimated Blood Loss (mL): Minimal    Complications: None    Specimens:   ID Type Source Tests Collected by Time Destination   A : POSTERIOR SCALP MASS *SUTURE AT 12 O'CLOCK* Tissue Tissue SURGICAL PATHOLOGY Joseph Amanda MD 6/10/2022 [id number]            INDICATION FOR PROCEDURE:  The patient is 46 y.o. male . All the risks, benefits, and alternative treatments were explained to the patient and an informed consent was obtained. DESCRIPTION OF PROCEDURE:  The patient was marked in the holding area. The patient was brought into the room. SCDs were placed and turned on prior to induction of anesthesia via endotracheal intubation. Then the patient  was placed on the table in the prone position. All areas were well padded. Everybody in the room was in agreement with all areas were well padded. All areas were prepped and draped in usual customary sterile manner. A timeout was performed. Everybody in the room was in agreement with the timeout. Then local anesthetic was injected. Then an incision was made over the area. This was done using a #15 blade. Then dissection was made circumferentially around the cyst.  There was quite a bit of scar tissue present due to multiple previous infections. There was also cottage cheese material still present in the cyst.  After the cyst was excised a suture was placed at the 12 o'clock position. So the scar tissues were removed. Then the area was irrigated. Hemostasis was achieved.    Then undermining was performed medially, laterally, superiorly, and inferiorly to obtain tension-free closure. .  Then using 3-0 Prolene in an interrupted manner the skin was closed. Antibiotic ointments were placed then fluffs were placed. Then Surginet was placed. The patient tolerated procedure well. The patient was transferred to recovery area in a stable condition.     Electronically signed by Ida Rodriguez MD on 6/10/2022 at 10:12 AM

## 2022-06-10 NOTE — ANESTHESIA PRE PROCEDURE
Department of Anesthesiology  Preprocedure Note       Name:  Carlos Celaya   Age:  46 y.o.  :  1970                                          MRN:  [id number]         Date:  6/10/2022      Surgeon: Adarsh Meza):  Rizwan Young MD    Procedure: Procedure(s):  POSTERIOR SCALP CYSTIC MASS EXCISION WITH COMPLEX CLOSURE    Medications prior to admission:   Prior to Admission medications    Medication Sig Start Date End Date Taking? Authorizing Provider   loratadine (CLARITIN) 10 MG tablet Take 1 tablet by mouth daily 22   Joann Simpson MD   zoster recombinant adjuvanted vaccine [facility]) 50 MCG/0.5ML SUSR injection 50 MCG IM then repeat 2-6 months.  22  Joann Simpson MD   pramipexole (MIRAPEX) 0.125 MG tablet Take 1 tablet by mouth nightly 22   Joann Simpson MD   hydrOXYzine (ATARAX) 50 MG tablet Take 1 tablet by mouth nightly 22   Joann Simpson MD   Misc Natural Products (GLUCOSAMINE CHOND CMP TRIPLE) TABS Take 2 tablets by mouth every evening    Historical Provider, MD   pantoprazole (PROTONIX) 20 MG tablet pantoprazole 20 mg tablet,delayed release 22   Joann Simpson MD   lisinopril (PRINIVIL;ZESTRIL) 40 MG tablet Take 1 tablet by mouth daily 22   Joann Simpson MD   amLODIPine (NORVASC) 10 MG tablet TAKE 1 TABLET BY MOUTH DAILY 22   Joann Simpson MD   hydroCHLOROthiazide (HYDRODIURIL) 25 MG tablet Take 1 tablet by mouth every morning 22   Joann Simpson MD   aMIL[facility]) 5 MG tablet Take 1 tablet by mouth daily 22   Joann Simpson MD   Calcium Carb-Cholecalciferol (CALCIUM 1000 + D) 1000-800 MG-UNIT TABS calcium   1 tablet by mouth once daily    Historical Provider, MD Tierra Cunha 500 MG CAPS krill oil   once daily    Historical Provider, MD   Multiple Vitamins-Minerals (MULTIVITAMIN ADULT EXTRA C PO) multivitamin   1 tablet by mouth once daily    Historical Provider, MD   zinc 50 MG CAPS zinc   1 tablet by mouth once daily Historical Provider, MD   acetaminophen (TYLENOL) 500 MG tablet every 6 hours as needed  1/1/1985   Historical Provider, MD   OXcarbazepine (TRILEPTAL) 600 MG tablet oxcarbazepine 600 mg tablet    Historical Provider, MD   buPROPion (WELLBUTRIN XL) 150 MG extended release tablet bupropion HCl  mg 24 hr tablet, extended release 5/4/19   Historical Provider, MD   buPROPion (WELLBUTRIN XL) 300 MG extended release tablet bupropion HCl  mg 24 hr tablet, extended release 5/4/19   Historical Provider, MD   FLUoxetine (PROZAC) 40 MG capsule fluoxetine 40 mg capsule 1/1/20   Historical Provider, MD   tamsulosin (FLOMAX) 0.4 MG capsule tamsulosin 0.4 mg capsule 8/4/20   Historical Provider, MD   Ascorbic Acid (VITAMIN C) 500 MG CAPS Take by mouth    Historical Provider, MD   Cholecalciferol (VITAMIN D3) 125 MCG (5000 UT) TABS Take by mouth    Historical Provider, MD   aspirin-acetaminophen-caffeine (EXCEDRIN MIGRAINE) 712-751-57 MG per tablet Take 1 tablet by mouth every 6 hours as needed for Headaches    Historical Provider, MD   Multiple Vitamins-Minerals (AIRBORNE PO) Take by mouth    Historical Provider, MD       Current medications:    Current Facility-Administered Medications   Medication Dose Route Frequency Provider Last Rate Last Admin    0.9 % sodium chloride infusion   IntraVENous Continuous Brien Smith MD        lactated ringers infusion   IntraVENous Continuous Brien Smith MD        sodium chloride flush 0.9 % injection 5-40 mL  5-40 mL IntraVENous 2 times per day Brien Smith MD        sodium chloride flush 0.9 % injection 5-40 mL  5-40 mL IntraVENous PRN Brien Smith MD        0.9 % sodium chloride infusion   IntraVENous PRN Brien Smith MD           Allergies:     Allergies   Allergen Reactions    No Known Allergies        Problem List:    Patient Active Problem List   Diagnosis Code    Bipolar disorder (HCC) F31.9    HTN (hypertension) I10    Change in bowel habits R19.4    Diverticulosis K57.90    Internal hemorrhoid K64.8    Mixed hyperlipidemia E78.2    Bipolar disorder ([facility] F31.9    Diabetes insipidus ([facility] E23.2    Spinal stenosis of lumbar region M48.061    Primary insomnia F51.01    History of renal cell carcinoma Z85.528    Scalp cyst L72.9    Restless leg G25.81    Gastroesophageal reflux disease without esophagitis K21.9       Past Medical History:        Diagnosis Date    Arthritis     lumbar stenosis    Bipolar disorder ([facility]     carlene melendez NP, and romeo oquendo- psych services    BPH (benign prostatic hyperplasia)     Cancer ([facility]     kidney    Diverticulosis     colonoscopy 10/2012    GERD (gastroesophageal reflux disease)     HLD (hyperlipidemia)     HTN (hypertension)     Internal hemorrhoid     colonoscopy 10/2012    SCOT (obstructive sleep apnea)        Past Surgical History:        Procedure Laterality Date    COLONOSCOPY  10/2012    KIDNEY SURGERY      removed cancer spot    KNEE SURGERY      torn meniscus       Social History:    Social History     Tobacco Use    Smoking status: Former Smoker     Packs/day: 1.00     Years: 15.00     Pack years: 15.00     Types: Cigarettes     Quit date: 7/5/2018     Years since quitting: 3.9    Smokeless tobacco: Never Used   Substance Use Topics    Alcohol use: Not Currently     Comment: not for 3 weeks                                Counseling given: Not Answered      Vital Signs (Current): There were no vitals filed for this visit.                                            BP Readings from Last 3 Encounters:   04/07/22 108/74   02/08/22 (!) 132/90   01/05/22 (!) 144/100       NPO Status:                                                                                 BMI:   Wt Readings from Last 3 Encounters:   04/07/22 215 lb 3.2 oz (97.6 kg)   03/30/22 210 lb (95.3 kg)   02/08/22 210 lb 3.2 oz (95.3 kg)     There is no height or weight on file to calculate BMI.    CBC:   Lab Results   Component Value Date    WBC 7.4 06/06/2022    RBC 5.08 06/06/2022    HGB 15.6 06/06/2022    HCT 45.4 06/06/2022    MCV 89.4 06/06/2022    RDW 12.1 06/06/2022     06/06/2022       CMP:   Lab Results   Component Value Date     06/06/2022    K 3.7 06/06/2022     06/06/2022    CO2 24 06/06/2022    BUN 18 06/06/2022    CREATININE 0.96 06/06/2022    GFRAA >60 06/06/2022    LABGLOM >60 06/06/2022    GLUCOSE 74 06/06/2022    PROT 7.0 01/21/2022    CALCIUM 9.6 06/06/2022    BILITOT 0.58 01/21/2022    ALKPHOS 136 01/21/2022    AST 21 01/21/2022    ALT 25 01/21/2022       POC Tests: No results for input(s): POCGLU, POCNA, POCK, POCCL, POCBUN, POCHEMO, POCHCT in the last 72 hours. Coags: No results found for: PROTIME, INR, APTT    HCG (If Applicable): No results found for: PREGTESTUR, PREGSERUM, HCG, HCGQUANT     ABGs: No results found for: PHART, PO2ART, LEL1MPZ, YWJ0MRV, BEART, Z0PPVIBK     Type & Screen (If Applicable):  No results found for: LABABO, LABRH    Drug/Infectious Status (If Applicable):  Lab Results   Component Value Date    HEPCAB NONREACTIVE 08/23/2012       COVID-19 Screening (If Applicable): No results found for: COVID19        Anesthesia Evaluation  Patient summary reviewed and Nursing notes reviewed no history of anesthetic complications:   Airway: Mallampati: II  TM distance: >3 FB   Neck ROM: full  Mouth opening: > = 3 FB   Dental: normal exam         Pulmonary:normal exam  breath sounds clear to auscultation  (+) sleep apnea:                             Cardiovascular:    (+) hypertension: no interval change, hyperlipidemia        Rhythm: regular  Rate: normal                    Neuro/Psych:   (+) psychiatric history:             ROS comment: Spinal stenosis of lumbar region GI/Hepatic/Renal:   (+) GERD: no interval change,           Endo/Other:    (+) malignancy/cancer. ROS comment: Cancer ([address] kidney  Abdominal:       Abdomen: soft. Vascular: negative vascular ROS.          Other Findings: Anesthesia Plan      MAC and general     ASA 2             Anesthetic plan and risks discussed with patient. Plan discussed with CRNA.                     Tiki Bianchi MD   6/10/2022

## 2022-06-13 LAB — DERMATOLOGY PATHOLOGY REPORT: NORMAL

## 2022-06-22 ENCOUNTER — OFFICE VISIT (OUTPATIENT)
Dept: SURGERY | Age: 52
End: 2022-06-22

## 2022-06-22 VITALS
HEIGHT: 70 IN | HEART RATE: 90 BPM | BODY MASS INDEX: 31.5 KG/M2 | RESPIRATION RATE: 16 BRPM | WEIGHT: 220 LBS | OXYGEN SATURATION: 100 %

## 2022-06-22 DIAGNOSIS — H91.93 BILATERAL HEARING LOSS, UNSPECIFIED HEARING LOSS TYPE: Primary | ICD-10-CM

## 2022-06-22 DIAGNOSIS — Z09 POSTOPERATIVE EXAMINATION: Primary | ICD-10-CM

## 2022-06-22 PROCEDURE — 99024 POSTOP FOLLOW-UP VISIT: CPT | Performed by: PLASTIC SURGERY

## 2022-06-22 PROCEDURE — 92557 COMPREHENSIVE HEARING TEST: CPT | Performed by: INTERNAL MEDICINE

## 2022-06-22 NOTE — PROGRESS NOTES
[address] [facility]  [address]       OFFICE POST-OP NOTE    Patient Name:  Lanna Dancer    :  1970    MRN:  [id number]  STATUS POST  Chief Complaint   Patient presents with    Post-Op Check      inflamed epidermal dermoid cyst        SUBJECTIVE  Patient seen and examined. Patient status post excision of posterior scalp mass. Was consistent with inflamed epidermoid cyst.  His surgery was performed on 6/10/2022  The pathology results were discussed with the patient and copy was provided to the patient. PHYSICAL EXAM  Vital Signs:  Pulse 90   Resp 16   Ht 5' 10\" (1.778 m)   Wt 220 lb (99.8 kg)   SpO2 100%   BMI 31.57 kg/m²     Incisions:  Suture line clean dry and intact. Patient has healed very well. Skin:  No evidence of infection. Neurologic:  Alert & oriented x 3. Component 6/10/22 1529   Dermatology Pathology Report -- Diagnosis --   SKIN, POSTERIOR SCALP, EXCISION:        -  INFLAMED EPIDERMOID CYST WITH DERMAL SCAR. Vincent Reed M.D., [address] [place] [address]. AP/CP, Dermatopathology   **Electronically Signed Out**   jet/2022         Clinical Information   Pre-op Diagnosis:  CYSTIC MASS   Operative Findings:  POSTERIOR SCALP MASS  SUTURE AT 12:00   Operation Performed:  EXCISION WITH COMPLEX CLOSURE     Source of Specimen   A: POSTERIOR SCALP MASS     Gross Description   \"ZAYDA GARCIA, POSTERIOR SCALP MASS SUTURE AT 12:00\"  It consists   of a 1.5 x 0.7 cm tan, hair bearing skin ellipse excised to a depth of   0.6 cm.  The epidermal surface displays a 0.2 cm apparent punctum 0.1   cm from the 12:00 margin.  A suture is present along one of the long   edges designating 12:00.  The 12:00 half is inked blue and the 6:00   half is inked green.  The specimen is sectioned from 3:00 to 9:00 to   reveal fibrotic cut surfaces with embedded hair.  The specimen is   submitted entirely as follows:       Cassette Summary:     \"A1\" 3:00 tip   \"A2\" Central cross section   \"A3\" 9:00 tip       Microscopic Description   Microscopic examination performed.       SURGICAL PATHOLOGY CONSULTATION         Patient Name: Leonie Meza: [id number]   Path Number: [id number]     [address], [facility] [address] [place], [address]   [phone]   Fax: [phone]        ASSESSMENT   Diagnosis Orders   1. Postoperative examination         PLAN  1. We will remove the sutures  Follow-up in a month. Recommended massaging the scar. Plan discussed with patient.     Electronically signed by:  Trevin Aguillon M.D.   6/22/2022

## 2022-06-23 ENCOUNTER — TELEPHONE (OUTPATIENT)
Dept: FAMILY MEDICINE CLINIC | Age: 52
End: 2022-06-23

## 2022-06-23 DIAGNOSIS — Z85.528 HISTORY OF RENAL CELL CARCINOMA: Primary | ICD-10-CM

## 2022-06-23 NOTE — TELEPHONE ENCOUNTER
I left a message for [place] to call the office. I want to verify if INS needs to be called for referral [place].

## 2022-06-23 NOTE — TELEPHONE ENCOUNTER
Patient called to see if a referral can be back dated to 03/01/2022.   He has been seeing Abbe Au, Dr Camila Gee and Dr Noemi Narvaez for past 4 years but had change of INS and was not told needed a referral.     Fax number is:  [phone] [facility]

## 2022-06-24 NOTE — TELEPHONE ENCOUNTER
Patient called back stating the office is needing the referral; not insurance. Referral placed and faxed.

## 2022-07-27 ENCOUNTER — OFFICE VISIT (OUTPATIENT)
Dept: SURGERY | Age: 52
End: 2022-07-27
Payer: MEDICARE

## 2022-07-27 VITALS
HEIGHT: 70 IN | WEIGHT: 195 LBS | BODY MASS INDEX: 27.92 KG/M2 | HEART RATE: 90 BPM | RESPIRATION RATE: 16 BRPM | OXYGEN SATURATION: 100 %

## 2022-07-27 DIAGNOSIS — L91.0 SCARRING, HYPERTROPHIC: Primary | ICD-10-CM

## 2022-07-27 PROCEDURE — 99213 OFFICE O/P EST LOW 20 MIN: CPT | Performed by: PLASTIC SURGERY

## 2022-07-27 NOTE — PROGRESS NOTES
MG tablet every 6 hours as needed       OXcarbazepine (TRILEPTAL) 600 MG tablet oxcarbazepine 600 mg tablet      buPROPion (WELLBUTRIN XL) 150 MG extended release tablet bupropion HCl  mg 24 hr tablet, extended release      buPROPion (WELLBUTRIN XL) 300 MG extended release tablet bupropion HCl  mg 24 hr tablet, extended release      FLUoxetine (PROZAC) 40 MG capsule fluoxetine 40 mg capsule      tamsulosin (FLOMAX) 0.4 MG capsule tamsulosin 0.4 mg capsule      Ascorbic Acid (VITAMIN C) 500 MG CAPS Take by mouth      Cholecalciferol (VITAMIN D3) 125 MCG (5000 UT) TABS Take by mouth      aspirin-acetaminophen-caffeine (EXCEDRIN MIGRAINE) 250-250-65 MG per tablet Take 1 tablet by mouth every 6 hours as needed for Headaches      Multiple Vitamins-Minerals (AIRBORNE PO) Take by mouth       No current facility-administered medications for this visit. Allergies:      Allergies   Allergen Reactions    No Known Allergies         Past Medical History:   Diagnosis Date    Arthritis     lumbar stenosis    Bipolar disorder ([facility]     carlene melendez NP, and romeo oquendo- psych services    BPH (benign prostatic hyperplasia)     Cancer ([facility]     kidney    Diverticulosis     colonoscopy 10/2012    GERD (gastroesophageal reflux disease)     HLD (hyperlipidemia)     HTN (hypertension)     Internal hemorrhoid     colonoscopy 10/2012    SCOT (obstructive sleep apnea)      Past Surgical History:   Procedure Laterality Date    COLONOSCOPY  10/2012    KIDNEY SURGERY      removed cancer spot    KNEE SURGERY      torn meniscus    PRE-MALIGNANT / BENIGN SKIN LESION EXCISION  06/10/2022    POSTERIOR SCALP CYSTIC MASS EXCISION WITH COMPLEX CLOSURE     PRE-MALIGNANT / BENIGN SKIN LESION EXCISION N/A 6/10/2022    POSTERIOR SCALP CYSTIC MASS EXCISION WITH COMPLEX CLOSURE performed by Patrizia Cotto MD at [facility] [place] [address] History     Socioeconomic History    Marital status:      Spouse name: Not on file Number of children: Not on file    Years of education: Not on file    Highest education level: Not on file   Occupational History    Not on file   Tobacco Use    Smoking status: Former     Packs/day: 1.00     Years: 15.00     Pack years: 15.00     Types: Cigarettes     Quit date: 2018     Years since quittin.0    Smokeless tobacco: Never   Vaping Use    Vaping Use: Never used   Substance and Sexual Activity    Alcohol use: Not Currently     Comment: not for 3 weeks    Drug use: Not Currently     Types: Marijuana Simmie Lang)    Sexual activity: Not on file   Other Topics Concern    Not on file   Social History Narrative    Not on file     Social Determinants of Health     Financial Resource Strain: Low Risk     Difficulty of Paying Living Expenses: Not hard at all   Food Insecurity: No Food Insecurity    Worried About Running Out of Food in the Last Year: Never true    Ran Out of Food in the Last Year: Never true   Transportation Needs: Not on file   Physical Activity: Inactive    Days of Exercise per Week: 0 days    Minutes of Exercise per Session: 0 min   Stress: Not on file   Social Connections: Not on file   Intimate Partner Violence: Not At Risk    Fear of Current or Ex-Partner: No    Emotionally Abused: No    Physically Abused: No    Sexually Abused: No   Housing Stability: Not on file     No family history on file. Review of Systems:     Constitutional: Negative for fever, chills, fatigue and unexpected weight change. HENT: Negative for hearing loss, sore throat and facial swelling. Eyes: Negative for pain and discharge. Respiratory: Negative for cough and shortness of breath. Cardiovascular: Patient has a history of hyperlipidemia and hypertension  Gastrointestinal: Patient has a history of internal hemorrhoids and diverticulosis. Skin: Negative for pallor and rash. Neurological: Negative for seizures, syncope and numbness. Hematological: Does not bruise/bleed easily.   Psychiatric/Behavioral: Negative for behavioral problems. Patient has a history of bipolar disorder    Physical Exam:   Pulse 90   Resp 16   Ht 5' 10\" (1.778 m)   Wt 195 lb (88.5 kg)   SpO2 100%   BMI 27.98 kg/m²    Body mass index is 27.98 kg/m². Physical Exam   Constitutional: Oriented to person, place, and time. Appears well-developed and well-nourished. No distress. HEENT: Normocephalic and atraumatic. External ears within normal limits. Extraocular muscles are intact. Conjunctivae were anicteric. Pulmonary/Chest: Effort normal. No respiratory distress. Neurological: Alert and oriented to person, place, and time. Skin: Scar is well-healed. Patient is getting some hypertrophic scarring on the medial aspect of the suture line. Extremities:  Moves all extremities. Abdomen:  Soft. Psychiatric: Normal mood and affect. Behavior is normal.    Labs:     Component 6/10/22 1529    Dermatology Pathology Report -- Diagnosis --   SKIN, POSTERIOR SCALP, EXCISION:        -  INFLAMED EPIDERMOID CYST WITH DERMAL SCAR. Ruba Webb M.D., [address] [place] [address]. AP/CP, Dermatopathology   **Electronically Signed Out**   jet/6/13/2022         Clinical Information   Pre-op Diagnosis:  CYSTIC MASS   Operative Findings:  POSTERIOR SCALP MASS  SUTURE AT 12:00   Operation Performed:  EXCISION WITH COMPLEX CLOSURE     Source of Specimen   A: POSTERIOR SCALP MASS     Gross Description   \"ZAYDA GARCIA, POSTERIOR SCALP MASS SUTURE AT 12:00\"  It consists   of a 1.5 x 0.7 cm tan, hair bearing skin ellipse excised to a depth of   0.6 cm. The epidermal surface displays a 0.2 cm apparent punctum 0.1   cm from the 12:00 margin. A suture is present along one of the long   edges designating 12:00. The 12:00 half is inked blue and the 6:00   half is inked green. The specimen is sectioned from 3:00 to 9:00 to   reveal fibrotic cut surfaces with embedded hair.   The specimen is   submitted entirely as follows:       Cassette Summary:     \"A1\" 3:00 tip \"A2\" Central cross section   \"A3\" 9:00 tip       Microscopic Description   Microscopic examination performed. SURGICAL PATHOLOGY CONSULTATION         Patient Name: Deedee Penny: [id number]   Path Number: [id number]     [address] [place] [address], [address]   [phone]   Fax: [phone]        Imaging:   No results found. Impression/Plan:      Diagnosis Orders   1. Scarring, hypertrophic          Plan:  I recommended for the patient to continue massaging the scar. Follow-up in 3 months. We also discussed possible Kenalog injection if the scar becomes thicker. Patient is to call and come in earlier if the scar is changing.             Electronically signed by:  Adela Goss M.D.   7/27/2022

## 2022-08-08 ENCOUNTER — OFFICE VISIT (OUTPATIENT)
Dept: FAMILY MEDICINE CLINIC | Age: 52
End: 2022-08-08
Payer: MEDICARE

## 2022-08-08 VITALS
HEART RATE: 94 BPM | OXYGEN SATURATION: 97 % | TEMPERATURE: 97.6 F | BODY MASS INDEX: 29.07 KG/M2 | WEIGHT: 202.6 LBS | SYSTOLIC BLOOD PRESSURE: 110 MMHG | DIASTOLIC BLOOD PRESSURE: 76 MMHG

## 2022-08-08 DIAGNOSIS — R52 GENERALIZED PAIN: ICD-10-CM

## 2022-08-08 DIAGNOSIS — E78.5 HYPERLIPIDEMIA, UNSPECIFIED HYPERLIPIDEMIA TYPE: ICD-10-CM

## 2022-08-08 DIAGNOSIS — M54.12 CERVICAL RADICULOPATHY: ICD-10-CM

## 2022-08-08 DIAGNOSIS — Z85.528 HISTORY OF RENAL CELL CARCINOMA: Primary | ICD-10-CM

## 2022-08-08 DIAGNOSIS — I10 PRIMARY HYPERTENSION: ICD-10-CM

## 2022-08-08 DIAGNOSIS — K21.9 GASTROESOPHAGEAL REFLUX DISEASE WITHOUT ESOPHAGITIS: ICD-10-CM

## 2022-08-08 DIAGNOSIS — E23.2 DIABETES INSIPIDUS (HCC): ICD-10-CM

## 2022-08-08 DIAGNOSIS — R20.2 PARESTHESIA OF BOTH HANDS: ICD-10-CM

## 2022-08-08 DIAGNOSIS — F31.9 BIPOLAR AFFECTIVE DISORDER, REMISSION STATUS UNSPECIFIED (HCC): ICD-10-CM

## 2022-08-08 PROCEDURE — 99214 OFFICE O/P EST MOD 30 MIN: CPT | Performed by: INTERNAL MEDICINE

## 2022-08-08 RX ORDER — PREGABALIN 75 MG/1
75 CAPSULE ORAL 2 TIMES DAILY
COMMUNITY
Start: 2022-08-01

## 2022-08-08 RX ORDER — PANTOPRAZOLE SODIUM 20 MG/1
TABLET, DELAYED RELEASE ORAL
Qty: 90 TABLET | Refills: 1 | Status: SHIPPED | OUTPATIENT
Start: 2022-08-08

## 2022-08-08 RX ORDER — AMLODIPINE BESYLATE 10 MG/1
TABLET ORAL
Qty: 90 TABLET | Refills: 1 | Status: SHIPPED | OUTPATIENT
Start: 2022-08-08

## 2022-08-08 RX ORDER — HYDROCHLOROTHIAZIDE 25 MG/1
25 TABLET ORAL EVERY MORNING
Qty: 90 TABLET | Refills: 1 | Status: SHIPPED | OUTPATIENT
Start: 2022-08-08

## 2022-08-08 RX ORDER — LISINOPRIL 40 MG/1
40 TABLET ORAL DAILY
Qty: 90 TABLET | Refills: 1 | Status: SHIPPED | OUTPATIENT
Start: 2022-08-08

## 2022-08-08 RX ORDER — AMILORIDE HYDROCHLORIDE 5 MG/1
5 TABLET ORAL DAILY
Qty: 90 TABLET | Refills: 1 | Status: SHIPPED | OUTPATIENT
Start: 2022-08-08

## 2022-08-08 ASSESSMENT — ENCOUNTER SYMPTOMS
BLOOD IN STOOL: 0
SHORTNESS OF BREATH: 0
CHOKING: 0
ANAL BLEEDING: 0
ABDOMINAL PAIN: 0
CHEST TIGHTNESS: 0
COUGH: 0
VOMITING: 0
BACK PAIN: 1
DIARRHEA: 0
NAUSEA: 0
WHEEZING: 0
CONSTIPATION: 0

## 2022-08-08 ASSESSMENT — VISUAL ACUITY: OU: 1

## 2022-08-08 NOTE — PROGRESS NOTES
[facility] PHYSICIANS  [facility] [address]  [place] [address]  Dept: [phone]  Dept Fax: [phone]      Kyleigh Bella is a 46 y.o. male who presents today for hismedical conditions/complaints as noted below. Kyleigh Bella is c/o of Hypertension (F/u), Diabetes (F/u), and Gastroesophageal Reflux (F/u)        Assessment/Plan:     1. History of renal cell carcinoma  2. Gastroesophageal reflux disease without esophagitis  -     pantoprazole (PROTONIX) 20 MG tablet; pantoprazole 20 mg tablet,delayed release, Disp-90 tablet, R-1Normal  3. Primary hypertension  -     lisinopril (PRINIVIL;ZESTRIL) 40 MG tablet; Take 1 tablet by mouth in the morning., Disp-90 tablet, R-1Normal  -     amLODIPine (NORVASC) 10 MG tablet; TAKE 1 TABLET BY MOUTH DAILY, Disp-90 tablet, R-1Normal  -     hydroCHLOROthiazide (HYDRODIURIL) 25 MG tablet; Take 1 tablet by mouth every morning, Disp-90 tablet, R-1Normal  -     aMILoride (MIDAMOR) 5 MG tablet; Take 1 tablet by mouth in the morning., Disp-90 tablet, R-1Normal  4. Diabetes insipidus (HCC)  -     hydroCHLOROthiazide (HYDRODIURIL) 25 MG tablet; Take 1 tablet by mouth every morning, Disp-90 tablet, R-1Normal  5. Bipolar affective disorder, remission status unspecified ([facility]  6. Hyperlipidemia, unspecified hyperlipidemia type  7. Generalized pain  -     AFL - Mabel Caba MD, Rheumatology, [facility]  8. Cervical radiculopathy  -     XR CERVICAL SPINE (4-5 VIEWS); Future  9. Paresthesia of both hands  -     XR CERVICAL SPINE (4-5 VIEWS); Future          No follow-ups on file. HPI     Had cyst removed from back his neck, uncomplicated post-op course   He has been seeing pain management, having generalized pain so he has been asked to get a referral to rheumatology. Considering spinal stimulator, taking lyrica while he decides. Seen urology, will be seeing them annually now     GERD - well controlled with pantoprazole.  Denies heartburn with food, nocturnal reflux, dysphagia, weight changes, anorexia, melena, hematochezia, diarrhea, nausea, vomiting. Hypertension-tolerating current regimen without chest pain, palpitations, dizziness, peripheral edema, dyspnea on exertion, orthopnea, paroxysmal nocturnal dyspnea. Hyperlipidemia-tolerating current regimen without myalgias, dyspepsia, jaundice. Mostly compliant with diet recommendations for low salt diet, tries to limit greasy/cheesy/fried foods, not very compliant with exercise recommendations. Cardiovascular risk factors: dyslipidemia, hypertension, obesity (BMI >= 30 kg/m2), and sedentary lifestyle        BP Readings from Last 3 Encounters:   22 110/76   06/10/22 104/73   22 108/74              Past Medical History:   Diagnosis Date    Arthritis     lumbar stenosis    Bipolar disorder ([facility]     carlene melendez NP, and romeo oquendo- psych services    BPH (benign prostatic hyperplasia)     Cancer ([facility]     kidney    Diverticulosis     colonoscopy 10/2012    GERD (gastroesophageal reflux disease)     HLD (hyperlipidemia)     HTN (hypertension)     Internal hemorrhoid     colonoscopy 10/2012    SCOT (obstructive sleep apnea)       Past Surgical History:   Procedure Laterality Date    COLONOSCOPY  10/2012    KIDNEY SURGERY      removed cancer spot    KNEE SURGERY      torn meniscus    PRE-MALIGNANT / BENIGN SKIN LESION EXCISION  06/10/2022    POSTERIOR SCALP CYSTIC MASS EXCISION WITH COMPLEX CLOSURE     PRE-MALIGNANT / BENIGN SKIN LESION EXCISION N/A 6/10/2022    POSTERIOR SCALP CYSTIC MASS EXCISION WITH COMPLEX CLOSURE performed by Yamel Galan MD at [address] [facility].       No family history on file.     Social History     Tobacco Use    Smoking status: Former     Packs/day: 1.00     Years: 15.00     Pack years: 15.00     Types: Cigarettes     Quit date: 2018     Years since quittin.0    Smokeless tobacco: Never   Substance Use Topics    Alcohol use: Not Currently Comment: not for 3 weeks        Allergies   Allergen Reactions    No Known Allergies      Prior to Visit Medications    Medication Sig Taking? Authorizing Provider   pregabalin (LYRICA) 75 MG capsule Take 75 mg by mouth in the morning and at bedtime.  Yes Historical Provider, MD   pramipexole (MIRAPEX) 0.125 MG tablet Take 1 tablet by mouth nightly Yes Joann Nowak MD   hydrOXYzine (ATARAX) 50 MG tablet Take 1 tablet by mouth nightly Yes Joann Nowak MD   Misc Natural Products (GLUCOSAMINE CHOND CMP TRIPLE) TABS Take 2 tablets by mouth every evening Yes Historical Provider, MD   pantoprazole (PROTONIX) 20 MG tablet pantoprazole 20 mg tablet,delayed release Yes Joann Nowak MD   lisinopril (PRINIVIL;ZESTRIL) 40 MG tablet Take 1 tablet by mouth daily Yes Theo Guevara MD   amLODIPine (NORVASC) 10 MG tablet TAKE 1 TABLET BY MOUTH DAILY Yes Joann Nowak MD   hydroCHLOROthiazide (HYDRODIURIL) 25 MG tablet Take 1 tablet by mouth every morning Yes Joann Nowak MD   aMILoride (MIDAMOR) 5 MG tablet Take 1 tablet by mouth daily Yes Joann Nowak MD   Calcium Carb-Cholecalciferol (CALCIUM 1000 + D) 1000-800 MG-UNIT TABS calcium   1 tablet by mouth once daily Yes Historical Provider, MD   Dillon Geoffrey Oil 500 MG CAPS krill oil   once daily Yes Historical Provider, MD   Multiple Vitamins-Minerals (MULTIVITAMIN ADULT EXTRA C PO) multivitamin   1 tablet by mouth once daily Yes Historical Provider, MD   zinc 50 MG CAPS zinc   1 tablet by mouth once daily Yes Historical Provider, MD   acetaminophen (TYLENOL) 500 MG tablet every 6 hours as needed  Yes Historical Provider, MD   OXcarbazepine (TRILEPTAL) 600 MG tablet oxcarbazepine 600 mg tablet Yes Historical Provider, MD   buPROPion (WELLBUTRIN XL) 150 MG extended release tablet bupropion HCl  mg 24 hr tablet, extended release Yes Historical Provider, MD   buPROPion (WELLBUTRIN XL) 300 MG extended release tablet bupropion HCl  mg 24 hr tablet, extended release Yes Historical Provider, MD   FLUoxetine (PROZAC) 40 MG capsule fluoxetine 40 mg capsule Yes Historical Provider, MD   tamsulosin (FLOMAX) 0.4 MG capsule tamsulosin 0.4 mg capsule Yes Historical Provider, MD   Ascorbic Acid (VITAMIN C) 500 MG CAPS Take by mouth Yes Historical Provider, MD   Cholecalciferol (VITAMIN D3) 125 MCG (5000 UT) TABS Take by mouth Yes Historical Provider, MD   aspirin-acetaminophen-caffeine (EXCEDRIN MIGRAINE) 510-628-31 MG per tablet Take 1 tablet by mouth every 6 hours as needed for Headaches Yes Historical Provider, MD   Multiple Vitamins-Minerals (AIRBORNE PO) Take by mouth Yes Historical Provider, MD   loratadine (CLARITIN) 10 MG tablet Take 1 tablet by mouth daily  Marah Mandujano MD   zoster recombinant adjuvanted vaccine (SHINGRIX) 50 MCG/0.5ML SUSR injection 50 MCG IM then repeat 2-6 months. Patient not taking: Reported on 8/8/2022  Joann Snwo MD       Review of Systems     Review of Systems   Constitutional:  Negative for fatigue, fever and unexpected weight change. Respiratory:  Negative for cough, choking, chest tightness, shortness of breath and wheezing. Cardiovascular:  Negative for chest pain, palpitations and leg swelling. Gastrointestinal:  Negative for abdominal pain, anal bleeding, blood in stool, constipation, diarrhea, nausea and vomiting. Endocrine: Negative. Musculoskeletal:  Positive for arthralgias and back pain. Negative for joint swelling and myalgias. Skin: Negative. Neurological:  Negative for dizziness. Psychiatric/Behavioral:  Negative for sleep disturbance. All other systems reviewed and are negative.     Objective     /76 (Site: Left Upper Arm, Position: Sitting, Cuff Size: Medium Adult)   Pulse 94   Temp 97.6 °F (36.4 °C)   Wt 202 lb 9.6 oz (91.9 kg)   SpO2 97%   BMI 29.07 kg/m²   Wt Readings from Last 3 Encounters:   08/08/22 202 lb 9.6 oz (91.9 kg)   07/27/22 195 lb (88.5 kg)   06/22/22 220 lb (99.8 kg)       Physical Exam  Vitals and nursing note reviewed. Constitutional:       General: He is not in acute distress. Appearance: He is well-developed. He is not ill-appearing. Eyes:      General: Lids are normal. Vision grossly intact. Cardiovascular:      Rate and Rhythm: Normal rate and regular rhythm. Heart sounds: Normal heart sounds, S1 normal and S2 normal. No murmur heard. No friction rub. No gallop. Pulmonary:      Effort: Pulmonary effort is normal. No respiratory distress. Breath sounds: Normal breath sounds. No wheezing. Abdominal:      General: Bowel sounds are normal.      Palpations: Abdomen is soft. There is no mass. Tenderness: There is no abdominal tenderness. There is no guarding. Musculoskeletal:         General: Normal range of motion. Skin:     General: Skin is warm and dry. Capillary Refill: Capillary refill takes less than 2 seconds. Neurological:      General: No focal deficit present. Mental Status: He is alert and oriented to person, place, and time. Data Review       Health Maintenance Due   Topic Date Due    Shingles vaccine (1 of 2) Never done           Patient given educational materials- see patient instructions. Discussed use, benefit, and side effects of prescribedmedications. All patient questions answered. Pt voiced understanding. Reviewedhealth maintenance. Instructed to continue current medications, diet and exercise. Patient agreed with treatment plan. Follow up as directed.      Electronically signedby Mikael Stephenson MD on 8/8/2022

## 2022-08-08 NOTE — PROGRESS NOTES
Pt is here for a regular 4 mo f/u  Pt was told to talk to you about getting a referral for rheum for the pain he is having     Pt was recently started on Lyrica because the injections were not working

## 2022-08-09 ENCOUNTER — HOSPITAL ENCOUNTER (OUTPATIENT)
Facility: CLINIC | Age: 52
Discharge: HOME | End: 2022-08-11
Payer: MEDICARE

## 2022-08-09 ENCOUNTER — HOSPITAL ENCOUNTER (OUTPATIENT)
Dept: GENERAL RADIOLOGY | Facility: CLINIC | Age: 52
Discharge: HOME | End: 2022-08-11
Payer: MEDICARE

## 2022-08-09 DIAGNOSIS — R20.2 PARESTHESIA OF BOTH HANDS: ICD-10-CM

## 2022-08-09 DIAGNOSIS — M54.12 CERVICAL RADICULOPATHY: ICD-10-CM

## 2022-08-09 PROCEDURE — 72040 X-RAY EXAM NECK SPINE 2-3 VW: CPT

## 2022-09-02 RX ORDER — CELECOXIB 100 MG/1
100 CAPSULE ORAL DAILY
Qty: 60 CAPSULE | Refills: 3 | Status: SHIPPED | OUTPATIENT
Start: 2022-09-02

## 2022-10-26 ENCOUNTER — OFFICE VISIT (OUTPATIENT)
Dept: SURGERY | Age: 52
End: 2022-10-26
Payer: MEDICARE

## 2022-10-26 VITALS — HEIGHT: 70 IN | BODY MASS INDEX: 29.07 KG/M2

## 2022-10-26 DIAGNOSIS — D48.5 NEOPLASM OF UNCERTAIN BEHAVIOR OF SKIN: Primary | ICD-10-CM

## 2022-10-26 PROCEDURE — 99213 OFFICE O/P EST LOW 20 MIN: CPT | Performed by: PLASTIC SURGERY

## 2022-10-26 NOTE — PROGRESS NOTES
[address] [facility]  [facility] [address]       Office Progress Note     No chief complaint on file. HPI:   Radha Burnett is a 46 y.o. male who presents status post lesion excision. Patient status post excision of posterior scalp mass. Was consistent with inflamed epidermoid cyst.  His surgery was performed on 6/10/2022  The pathology results were discussed with the patient and copy was provided to the patient. Patient has a new lesion on the right temporal area. It appeared in the summer. Medications:     Current Outpatient Medications   Medication Sig Dispense Refill    celecoxib (CELEBREX) 100 MG capsule Take 1 capsule by mouth daily 60 capsule 3    pregabalin (LYRICA) 75 MG capsule Take 75 mg by mouth in the morning and at bedtime. pantoprazole (PROTONIX) 20 MG tablet pantoprazole 20 mg tablet,delayed release 90 tablet 1    lisinopril (PRINIVIL;ZESTRIL) 40 MG tablet Take 1 tablet by mouth in the morning. 90 tablet 1    amLODIPine (NORVASC) 10 MG tablet TAKE 1 TABLET BY MOUTH DAILY 90 tablet 1    hydroCHLOROthiazide (HYDRODIURIL) 25 MG tablet Take 1 tablet by mouth every morning 90 tablet 1    aMILoride (MIDAMOR) 5 MG tablet Take 1 tablet by mouth in the morning.  90 tablet 1    pramipexole (MIRAPEX) 0.125 MG tablet Take 1 tablet by mouth nightly 90 tablet 1    hydrOXYzine (ATARAX) 50 MG tablet Take 1 tablet by mouth nightly 90 tablet 1    Misc Natural Products (GLUCOSAMINE CHOND CMP TRIPLE) TABS Take 2 tablets by mouth every evening      Calcium Carb-Cholecalciferol (CALCIUM 1000 + D) 1000-800 MG-UNIT TABS calcium   1 tablet by mouth once daily      Krill Oil 500 MG CAPS krill oil   once daily      Multiple Vitamins-Minerals (MULTIVITAMIN ADULT EXTRA C PO) multivitamin   1 tablet by mouth once daily      zinc 50 MG CAPS zinc   1 tablet by mouth once daily      acetaminophen (TYLENOL) 500 MG tablet every 6 hours as needed       OXcarbazepine (TRILEPTAL) 600 MG tablet oxcarbazepine 600 mg tablet      buPROPion (WELLBUTRIN XL) 150 MG extended release tablet bupropion HCl  mg 24 hr tablet, extended release      buPROPion (WELLBUTRIN XL) 300 MG extended release tablet bupropion HCl  mg 24 hr tablet, extended release      FLUoxetine (PROZAC) 40 MG capsule fluoxetine 40 mg capsule      tamsulosin (FLOMAX) 0.4 MG capsule tamsulosin 0.4 mg capsule      Ascorbic Acid (VITAMIN C) 500 MG CAPS Take by mouth      Cholecalciferol (VITAMIN D3) 125 MCG (5000 UT) TABS Take by mouth      aspirin-acetaminophen-caffeine (EXCEDRIN MIGRAINE) 250-250-65 MG per tablet Take 1 tablet by mouth every 6 hours as needed for Headaches      Multiple Vitamins-Minerals (AIRBORNE PO) Take by mouth       No current facility-administered medications for this visit. Allergies:      Allergies   Allergen Reactions    No Known Allergies         Past Medical History:   Diagnosis Date    Arthritis     lumbar stenosis    [facility]     carlene melendez NP, and romeo oquendo- psych services    BPH (benign prostatic hyperplasia)     Cancer ([facility]     kidney    Diverticulosis     colonoscopy 10/2012    GERD (gastroesophageal reflux disease)     HLD (hyperlipidemia)     HTN (hypertension)     Internal hemorrhoid     colonoscopy 10/2012    SCOT (obstructive sleep apnea)      Past Surgical History:   Procedure Laterality Date    COLONOSCOPY  10/2012    KIDNEY SURGERY      removed cancer spot    KNEE SURGERY      torn meniscus    PRE-MALIGNANT / BENIGN SKIN LESION EXCISION  06/10/2022    POSTERIOR SCALP CYSTIC MASS EXCISION WITH COMPLEX CLOSURE     PRE-MALIGNANT / BENIGN SKIN LESION EXCISION N/A 6/10/2022    POSTERIOR SCALP CYSTIC MASS EXCISION WITH COMPLEX CLOSURE performed by Leslie Lance MD at [address] [address] History     Socioeconomic History    Marital status:      Spouse name: Not on file Number of children: Not on file    Years of education: Not on file    Highest education level: Not on file   Occupational History    Not on file   Tobacco Use    Smoking status: Former     Packs/day: 1.00     Years: 15.00     Pack years: 15.00     Types: Cigarettes     Quit date: 2018     Years since quittin.3    Smokeless tobacco: Never   Vaping Use    Vaping Use: Never used   Substance and Sexual Activity    Alcohol use: Not Currently     Comment: not for 3 weeks    Drug use: Not Currently     Types: Marijuana Aloma Arian)    Sexual activity: Not on file   Other Topics Concern    Not on file   Social History Narrative    Not on file     Social Determinants of Health     Financial Resource Strain: Low Risk     Difficulty of Paying Living Expenses: Not hard at all   Food Insecurity: No Food Insecurity    Worried About Running Out of Food in the Last Year: Never true    Ran Out of Food in the Last Year: Never true   Transportation Needs: Not on file   Physical Activity: Inactive    Days of Exercise per Week: 0 days    Minutes of Exercise per Session: 0 min   Stress: Not on file   Social Connections: Not on file   Intimate Partner Violence: Not At Risk    Fear of Current or Ex-Partner: No    Emotionally Abused: No    Physically Abused: No    Sexually Abused: No   Housing Stability: Not on file     No family history on file. Review of Systems:     Constitutional: Negative for fever, chills, fatigue and unexpected weight change. HENT: Negative for hearing loss, sore throat and facial swelling. Eyes: Negative for pain and discharge. Respiratory: Negative for cough and shortness of breath. Cardiovascular: Patient has a history of hyperlipidemia and hypertension  Gastrointestinal: Patient has a history of internal hemorrhoids and diverticulosis. Skin: Negative for pallor and rash. Neurological: Negative for seizures, syncope and numbness. Hematological: Does not bruise/bleed easily.   Psychiatric/Behavioral: Negative for behavioral problems. Patient has a history of bipolar disorder    Physical Exam:   There were no vitals taken for this visit. There is no height or weight on file to calculate BMI. Physical Exam   Constitutional: Oriented to person, place, and time. Appears well-developed and well-nourished. No distress. HEENT: Normocephalic and atraumatic. External ears within normal limits. Extraocular muscles are intact. Conjunctivae were anicteric. Pulmonary/Chest: Effort normal. No respiratory distress. Neurological: Alert and oriented to person, place, and time. Skin: Scar is well-healed. Patient is getting some hypertrophic scarring on the medial aspect of the suture line. This has improved. Patient has a new skin lesion on the right temporal scalp. It appears to be seborrheic keratosis. Extremities:  Moves all extremities. Abdomen:  Soft. Psychiatric: Normal mood and affect. Behavior is normal.    Labs:     Component 6/10/22 1529    Dermatology Pathology Report -- Diagnosis --   SKIN, POSTERIOR SCALP, EXCISION:        -  INFLAMED EPIDERMOID CYST WITH DERMAL SCAR. Millie Luna M.D., [address] [place] [address]. AP/CP, Dermatopathology   **Electronically Signed Out**   jet/6/13/2022         Clinical Information   Pre-op Diagnosis:  CYSTIC MASS   Operative Findings:  POSTERIOR SCALP MASS  SUTURE AT 12:00   Operation Performed:  EXCISION WITH COMPLEX CLOSURE     Source of Specimen   A: POSTERIOR SCALP MASS     Gross Description   \"ZAYDA GARCIA, POSTERIOR SCALP MASS SUTURE AT 12:00\"  It consists   of a 1.5 x 0.7 cm tan, hair bearing skin ellipse excised to a depth of   0.6 cm. The epidermal surface displays a 0.2 cm apparent punctum 0.1   cm from the 12:00 margin. A suture is present along one of the long   edges designating 12:00. The 12:00 half is inked blue and the 6:00   half is inked green. The specimen is sectioned from 3:00 to 9:00 to   reveal fibrotic cut surfaces with embedded hair.   The

## 2022-11-07 DIAGNOSIS — G25.81 RESTLESS LEG: ICD-10-CM

## 2022-11-07 DIAGNOSIS — I10 PRIMARY HYPERTENSION: ICD-10-CM

## 2022-11-07 DIAGNOSIS — F51.01 PRIMARY INSOMNIA: ICD-10-CM

## 2022-11-07 RX ORDER — HYDROXYZINE 50 MG/1
50 TABLET, FILM COATED ORAL NIGHTLY
Qty: 90 TABLET | Refills: 1 | Status: SHIPPED | OUTPATIENT
Start: 2022-11-07

## 2022-11-07 RX ORDER — AMILORIDE HYDROCHLORIDE 5 MG/1
5 TABLET ORAL DAILY
Qty: 90 TABLET | Refills: 1 | Status: SHIPPED | OUTPATIENT
Start: 2022-11-07

## 2022-11-07 RX ORDER — PRAMIPEXOLE DIHYDROCHLORIDE 0.12 MG/1
TABLET ORAL
Qty: 90 TABLET | Refills: 1 | Status: SHIPPED | OUTPATIENT
Start: 2022-11-07

## 2022-11-07 NOTE — TELEPHONE ENCOUNTER
Last visit: 08/08/22  Last Med refill: 07/21/2022  Does patient have enough medication for 72 hours: Yes    Next Visit Date:  Future Appointments   Date Time Provider Adelita Rodriguezi   2/8/2023  1:00 PM Joann Israel MD [facility]   4/26/2023 11:30 AM Reji Echols MD [facility] [address] Maintenance   Topic Date Due    Shingles vaccine (1 of 2) Never done    Flu vaccine (1) 08/01/2022    COVID-19 Vaccine (3 - Booster for Pfizer series) 04/07/2024 (Originally 9/18/2021)    Depression Monitoring  04/07/2023    Annual Wellness Visit (AWV)  04/08/2023    Colorectal Cancer Screen  06/11/2025    Lipids  01/21/2027    DTaP/Tdap/Td vaccine (2 - Td or Tdap) 10/01/2030    Hepatitis C screen  Completed    HIV screen  Completed    Hepatitis A vaccine  Aged Out    Hib vaccine  Aged Out    Meningococcal (ACWY) vaccine  Aged Out    Pneumococcal 0-64 years Vaccine  Aged Out       Hemoglobin A1C (%)   Date Value   01/21/2022 5.0   05/11/2017 4.7   12/22/2016 4.6             ( goal A1C is < 7)   Microalb/Crt.  Ratio (mcg/mg creat)   Date Value   06/16/2016 9     LDL Cholesterol (mg/dL)   Date Value   01/21/2022 118   10/04/2017 91       (goal LDL is <100)   AST (U/L)   Date Value   01/21/2022 21     ALT (U/L)   Date Value   01/21/2022 25     BUN (mg/dL)   Date Value   06/06/2022 18     BP Readings from Last 3 Encounters:   08/08/22 110/76   06/10/22 104/73   04/07/22 108/74          (goal 120/80)    All Future Testing planned in [facility]  Lab Frequency Next Occurrence   US SOFT TISSUE LIMITED AREA Once 05/26/2022               Patient Active Problem List:     Bipolar disorder ([address]     HTN (hypertension)     Change in bowel habits     Diverticulosis     Internal hemorrhoid     Mixed hyperlipidemia     Bipolar disorder ([address]     Diabetes insipidus ([address]     Spinal stenosis of lumbar region     Primary insomnia     History of renal cell carcinoma     Scalp cyst     Restless leg     Gastroesophageal reflux

## 2022-12-23 ENCOUNTER — TELEPHONE (OUTPATIENT)
Dept: FAMILY MEDICINE CLINIC | Age: 52
End: 2022-12-23

## 2023-01-03 ENCOUNTER — OFFICE VISIT (OUTPATIENT)
Dept: FAMILY MEDICINE CLINIC | Age: 53
End: 2023-01-03
Payer: MEDICARE

## 2023-01-03 VITALS
DIASTOLIC BLOOD PRESSURE: 84 MMHG | WEIGHT: 216.8 LBS | HEART RATE: 100 BPM | BODY MASS INDEX: 31.11 KG/M2 | TEMPERATURE: 97.5 F | SYSTOLIC BLOOD PRESSURE: 120 MMHG | OXYGEN SATURATION: 96 %

## 2023-01-03 DIAGNOSIS — G25.81 RESTLESS LEG: ICD-10-CM

## 2023-01-03 DIAGNOSIS — E23.2 DIABETES INSIPIDUS (HCC): ICD-10-CM

## 2023-01-03 DIAGNOSIS — Z01.818 PREOP EXAM FOR INTERNAL MEDICINE: Primary | ICD-10-CM

## 2023-01-03 DIAGNOSIS — F31.9 BIPOLAR AFFECTIVE DISORDER, REMISSION STATUS UNSPECIFIED (HCC): ICD-10-CM

## 2023-01-03 DIAGNOSIS — Z23 NEED FOR IMMUNIZATION AGAINST INFLUENZA: ICD-10-CM

## 2023-01-03 PROCEDURE — 99214 OFFICE O/P EST MOD 30 MIN: CPT | Performed by: INTERNAL MEDICINE

## 2023-01-03 PROCEDURE — 3074F SYST BP LT 130 MM HG: CPT | Performed by: INTERNAL MEDICINE

## 2023-01-03 PROCEDURE — G0008 ADMIN INFLUENZA VIRUS VAC: HCPCS | Performed by: INTERNAL MEDICINE

## 2023-01-03 PROCEDURE — 3079F DIAST BP 80-89 MM HG: CPT | Performed by: INTERNAL MEDICINE

## 2023-01-03 PROCEDURE — 90674 CCIIV4 VAC NO PRSV 0.5 ML IM: CPT | Performed by: INTERNAL MEDICINE

## 2023-01-03 RX ORDER — CHLORHEXIDINE GLUCONATE 0.12 MG/ML
RINSE ORAL
COMMUNITY
Start: 2022-12-13

## 2023-01-03 RX ORDER — PRAMIPEXOLE DIHYDROCHLORIDE 0.25 MG/1
0.25 TABLET ORAL
Qty: 90 TABLET | Refills: 1 | Status: SHIPPED | OUTPATIENT
Start: 2023-01-03

## 2023-01-03 ASSESSMENT — PATIENT HEALTH QUESTIONNAIRE - PHQ9
SUM OF ALL RESPONSES TO PHQ9 QUESTIONS 1 & 2: 0
2. FEELING DOWN, DEPRESSED OR HOPELESS: 0
3. TROUBLE FALLING OR STAYING ASLEEP: 1
7. TROUBLE CONCENTRATING ON THINGS, SUCH AS READING THE NEWSPAPER OR WATCHING TELEVISION: 0
SUM OF ALL RESPONSES TO PHQ QUESTIONS 1-9: 1
10. IF YOU CHECKED OFF ANY PROBLEMS, HOW DIFFICULT HAVE THESE PROBLEMS MADE IT FOR YOU TO DO YOUR WORK, TAKE CARE OF THINGS AT HOME, OR GET ALONG WITH OTHER PEOPLE: 0
SUM OF ALL RESPONSES TO PHQ QUESTIONS 1-9: 1
8. MOVING OR SPEAKING SO SLOWLY THAT OTHER PEOPLE COULD HAVE NOTICED. OR THE OPPOSITE, BEING SO FIGETY OR RESTLESS THAT YOU HAVE BEEN MOVING AROUND A LOT MORE THAN USUAL: 0
9. THOUGHTS THAT YOU WOULD BE BETTER OFF DEAD, OR OF HURTING YOURSELF: 0
SUM OF ALL RESPONSES TO PHQ QUESTIONS 1-9: 1
5. POOR APPETITE OR OVEREATING: 0
6. FEELING BAD ABOUT YOURSELF - OR THAT YOU ARE A FAILURE OR HAVE LET YOURSELF OR YOUR FAMILY DOWN: 0
4. FEELING TIRED OR HAVING LITTLE ENERGY: 0
1. LITTLE INTEREST OR PLEASURE IN DOING THINGS: 0
SUM OF ALL RESPONSES TO PHQ QUESTIONS 1-9: 1

## 2023-01-03 NOTE — PROGRESS NOTES
Pt is here today for a pre-op    Pt is having back surgery on 01/09/2023 Dr. Soraida Virgen  Pt did have his pre-testing done on 12/26/2022 at [facility]     Pt would like to discuss his medications, states he is not sure why he is taking so many

## 2023-01-03 NOTE — PATIENT INSTRUCTIONS
Stop Celebrex, multivitamins and Excedrin one week prior to surgery - last dose 1/2/23  On morning of surgery - take trileptal, pantoprazole, pramipexole as well as Lisinopril, Amlodipine and Hydrochlorothiazide for BP. No other medications on morning of surgery. Try to take the amiloride as soon as you can after you start tolerating fluids by mouth - this will allow your diabetes insipidus to remain controlled. You may resume your normal evening medications after the surgery once you are tolerating food.

## 2023-01-03 NOTE — PROGRESS NOTES
Preoperative Consultation      Ashley Meckel  YOB: 1970    Date of Service:  1/3/2023    Vitals:    01/03/23 1143   BP: 120/84   Site: Left Upper Arm   Position: Sitting   Cuff Size: Large Adult   Pulse: 100   Temp: 97.5 °F (36.4 °C)   SpO2: 96%   Weight: 216 lb 12.8 oz (98.3 kg)      Wt Readings from Last 2 Encounters:   01/03/23 216 lb 12.8 oz (98.3 kg)   08/08/22 202 lb 9.6 oz (91.9 kg)     BP Readings from Last 3 Encounters:   01/03/23 120/84   08/08/22 110/76   06/10/22 104/73        Chief Complaint   Patient presents with    Pre-op Exam     Allergies   Allergen Reactions    No Known Allergies      Outpatient Medications Marked as Taking for the 1/3/23 encounter (Office Visit) with Coral Harding MD   Medication Sig Dispense Refill    chlorhexidine (PERIDEX) 0.12 % solution       pramipexole (MIRAPEX) 0.125 MG tablet TAKE ONE TABLET BY MOUTH ONCE NIGHTLY 90 tablet 1    hydrOXYzine HCl (ATARAX) 50 MG tablet Take 1 tablet by mouth nightly 90 tablet 1    aMILoride (MIDAMOR) 5 MG tablet Take 1 tablet by mouth daily 90 tablet 1    celecoxib (CELEBREX) 100 MG capsule Take 1 capsule by mouth daily (Patient taking differently: Take 100 mg by mouth 2 times daily) 60 capsule 3    pregabalin (LYRICA) 75 MG capsule Take 75 mg by mouth in the morning and at bedtime. pantoprazole (PROTONIX) 20 MG tablet pantoprazole 20 mg tablet,delayed release 90 tablet 1    lisinopril (PRINIVIL;ZESTRIL) 40 MG tablet Take 1 tablet by mouth in the morning.  90 tablet 1    amLODIPine (NORVASC) 10 MG tablet TAKE 1 TABLET BY MOUTH DAILY 90 tablet 1    hydroCHLOROthiazide (HYDRODIURIL) 25 MG tablet Take 1 tablet by mouth every morning 90 tablet 1    Misc Natural Products (GLUCOSAMINE CHOND CMP TRIPLE) TABS Take 2 tablets by mouth every evening      Calcium Carb-Cholecalciferol (CALCIUM 1000 + D) 1000-800 MG-UNIT TABS calcium   1 tablet by mouth once daily      Krill Oil 500 MG CAPS krill oil   once daily Multiple Vitamins-Minerals (MULTIVITAMIN ADULT EXTRA C PO) multivitamin   1 tablet by mouth once daily      zinc 50 MG CAPS zinc   1 tablet by mouth once daily      acetaminophen (TYLENOL) 500 MG tablet every 6 hours as needed       OXcarbazepine (TRILEPTAL) 600 MG tablet oxcarbazepine 600 mg tablet      buPROPion (WELLBUTRIN XL) 150 MG extended release tablet bupropion HCl  mg 24 hr tablet, extended release      buPROPion (WELLBUTRIN XL) 300 MG extended release tablet bupropion HCl  mg 24 hr tablet, extended release      FLUoxetine (PROZAC) 40 MG capsule fluoxetine 40 mg capsule      tamsulosin (FLOMAX) 0.4 MG capsule tamsulosin 0.4 mg capsule      Ascorbic Acid (VITAMIN C) 500 MG CAPS Take by mouth      Cholecalciferol (VITAMIN D3) 125 MCG (5000 UT) TABS Take by mouth      Multiple Vitamins-Minerals (AIRBORNE PO) Take by mouth         This patient presents to the office today for a preoperative consultation at the request of surgeon, Dr. Meliton Adams, who plans on performing L4-5 TLIF on January 9 at [facility]. The current problem began several years ago, and symptoms have been worsening with time. Conservative therapy: Yes: injections, ablation, medications, PT, which has been ineffective. .    Planned anesthesia: General   Known anesthesia problems: None   Bleeding risk: No recent or remote history of abnormal bleeding  Personal or FH of DVT/PE: No    Patient objection to receiving blood products: No    Patient Active Problem List   Diagnosis    Bipolar disorder ([facility]    HTN (hypertension)    Change in bowel habits    Diverticulosis    Internal hemorrhoid    Mixed hyperlipidemia    Bipolar disorder ([facility]    Diabetes insipidus ([facility]    Spinal stenosis of lumbar region    Primary insomnia    History of renal cell carcinoma    Scalp cyst    Restless leg    Gastroesophageal reflux disease without esophagitis       Past Medical History:   Diagnosis Date    Arthritis     lumbar stenosis    Bipolar disorder ([facility] carlene melendez NP, and romeo oquendo- psych services    BPH (benign prostatic hyperplasia)     [facility]     kidney    Diverticulosis     colonoscopy 10/2012    GERD (gastroesophageal reflux disease)     HLD (hyperlipidemia)     HTN (hypertension)     Internal hemorrhoid     colonoscopy 10/2012    SCOT (obstructive sleep apnea)      Past Surgical History:   Procedure Laterality Date    COLONOSCOPY  10/2012    KIDNEY SURGERY      removed cancer spot    KNEE SURGERY      torn meniscus    PRE-MALIGNANT / BENIGN SKIN LESION EXCISION  06/10/2022    POSTERIOR SCALP CYSTIC MASS EXCISION WITH COMPLEX CLOSURE     PRE-MALIGNANT / BENIGN SKIN LESION EXCISION N/A 6/10/2022    POSTERIOR SCALP CYSTIC MASS EXCISION WITH COMPLEX CLOSURE performed by Echo Cesar MD at [address] [facility].     No family history on file.   Social History     Socioeconomic History    Marital status:      Spouse name: Not on file    Number of children: Not on file    Years of education: Not on file    Highest education level: Not on file   Occupational History    Not on file   Tobacco Use    Smoking status: Former     Packs/day: 1.00     Years: 15.00     Pack years: 15.00     Types: Cigarettes     Quit date: 2018     Years since quittin.5    Smokeless tobacco: Never   Vaping Use    Vaping Use: Never used   Substance and Sexual Activity    Alcohol use: Not Currently     Comment: not for 3 weeks    Drug use: Not Currently     Types: Marijuana Rolando Blow)    Sexual activity: Not on file   Other Topics Concern    Not on file   Social History Narrative    Not on file     Social Determinants of Health     Financial Resource Strain: Low Risk     Difficulty of Paying Living Expenses: Not hard at all   Food Insecurity: No Food Insecurity    Worried About Running Out of Food in the Last Year: Never true    [address] in the Last Year: Never true   Transportation Needs: Not on file   Physical Activity: Inactive    Days of Exercise per Week: 0 days    Minutes of Exercise per Session: 0 min   Stress: Not on file   Social Connections: Not on file   Intimate Partner Violence: Not At Risk    Fear of Current or Ex-Partner: No    Emotionally Abused: No    Physically Abused: No    Sexually Abused: No   Housing Stability: Not on file       Review of Systems  A comprehensive review of systems was negative except for what was noted in the HPI. Physical Exam   Constitutional: He is oriented to person, place, and time. He appears well-developed and well-nourished. No distress. HENT:   Head: Normocephalic and atraumatic. Mouth/Throat: Uvula is midline, oropharynx is clear and moist and mucous membranes are normal.   Eyes: Conjunctivae and EOM are normal. Pupils are equal, round, and reactive to light. Neck: Trachea normal and normal range of motion. Neck supple. No JVD present. Carotid bruit is not present. No mass and no thyromegaly present. Cardiovascular: Normal rate, regular rhythm, normal heart sounds and intact distal pulses. Exam reveals no gallop and no friction rub. No murmur heard. Pulmonary/Chest: Effort normal and breath sounds normal. No respiratory distress. He has no wheezes. He has no rales. Abdominal: Soft. Normal aorta and bowel sounds are normal. He exhibits no distension and no mass. There is no hepatosplenomegaly. No tenderness. Musculoskeletal: He exhibits no edema and no tenderness. Neurological: He is alert and oriented to person, place, and time. He has normal strength. No cranial nerve deficit or sensory deficit. Coordination and gait normal.   Skin: Skin is warm and dry. No rash noted. No erythema. Psychiatric: He has a normal mood and affect. His behavior is normal.     EKG Interpretation:  normal EKG, normal sinus rhythm, unchanged from previous tracings. Lab Review not applicable  Done at [facility]         Assessment:       46 y.o. patient with planned surgery as above.     Known risk factors for perioperative complications: Hypertension, diabetes insipidus  Current medications which may produce withdrawal symptoms if withheld perioperatively: none      Plan:     1. Preoperative workup as follows: EKG reviewed. Labs done at [facility]. 2. Change in medication regimen before surgery: see instructions below  3. Prophylaxis for cardiac events with perioperative beta-blockers: Not indicated  ACC/AHA indications for pre-operative beta-blocker use:    Vascular surgery with history of postitive stress test  Intermediate or high risk surgery with history of CAD   Intermediate or high risk surgery with multiple clinical predictors of CAD- 2 of the following: history of compensated or prior heart failure, history of cerebrovascular disease, DM, or renal insufficiency    Routine administration of higher-dose, long-acting metoprolol in beta-blocker-naïve patients on the day of surgery, and in the absence of dose titration is associated with an overall increase in mortality. Beta-blockers should be started days to weeks prior to surgery and titrated to pulse < 70.  4. Deep vein thrombosis prophylaxis: regimen to be chosen by surgical team  5. Pending lab results from [facility]     Patient Instructions   Stop celebrex, multivitamins and excedrin one week prior to surgery - last dose 1/2/23  On morning of surgery - take trileptal, pantoprazole, pramipexole as well as Lisinopril, Amlodipine and Hydrochlorothiazide for BP. No other medications on morning of surgery. Try to take the amiloride as soon as you can after you start tolerating fluids by mouth - this will allow your diabetes insipidus to remain controlled. You may resume your normal evening medications after the surgery once you are tolerating food.

## 2023-01-03 NOTE — LETTER
[facility]  [address] [facility]. [facility], [address] [place]  [phone]      2023      RE: Yun KENDALL 1970    Dear Dr. Shayan Coronado was evaluated in my office for pre-operative evaluation. Based on review of information available to me at this time, the patient appears to be at no increased risk for the planned procedure. Please see the attached office note for further details regarding measures to minimize perioperative risk. If you have any questions, please feel free to contact me.       Sincerely,        Scott Weller MD

## 2023-02-08 ENCOUNTER — OFFICE VISIT (OUTPATIENT)
Dept: FAMILY MEDICINE CLINIC | Age: 53
End: 2023-02-08

## 2023-02-08 VITALS
HEART RATE: 93 BPM | OXYGEN SATURATION: 98 % | WEIGHT: 211 LBS | DIASTOLIC BLOOD PRESSURE: 66 MMHG | SYSTOLIC BLOOD PRESSURE: 100 MMHG | BODY MASS INDEX: 30.28 KG/M2 | TEMPERATURE: 97.1 F

## 2023-02-08 DIAGNOSIS — M48.062 LUMBAR STENOSIS WITH NEUROGENIC CLAUDICATION: Primary | ICD-10-CM

## 2023-02-08 DIAGNOSIS — I10 PRIMARY HYPERTENSION: ICD-10-CM

## 2023-02-08 DIAGNOSIS — E23.2 DIABETES INSIPIDUS (HCC): ICD-10-CM

## 2023-02-08 DIAGNOSIS — K21.9 GASTROESOPHAGEAL REFLUX DISEASE WITHOUT ESOPHAGITIS: ICD-10-CM

## 2023-02-08 DIAGNOSIS — Z23 NEED FOR SHINGLES VACCINE: ICD-10-CM

## 2023-02-08 RX ORDER — PANTOPRAZOLE SODIUM 20 MG/1
TABLET, DELAYED RELEASE ORAL
Qty: 90 TABLET | Refills: 1 | Status: SHIPPED | OUTPATIENT
Start: 2023-02-08

## 2023-02-08 RX ORDER — HYDROCHLOROTHIAZIDE 25 MG/1
25 TABLET ORAL EVERY MORNING
Qty: 90 TABLET | Refills: 1 | Status: SHIPPED | OUTPATIENT
Start: 2023-02-08

## 2023-02-08 RX ORDER — LISINOPRIL 40 MG/1
40 TABLET ORAL DAILY
Qty: 90 TABLET | Refills: 1 | Status: SHIPPED | OUTPATIENT
Start: 2023-02-08

## 2023-02-08 RX ORDER — AMLODIPINE BESYLATE 10 MG/1
TABLET ORAL
Qty: 90 TABLET | Refills: 1 | Status: SHIPPED | OUTPATIENT
Start: 2023-02-08

## 2023-02-08 RX ORDER — ZOSTER VACCINE RECOMBINANT, ADJUVANTED 50 MCG/0.5
0.5 KIT INTRAMUSCULAR SEE ADMIN INSTRUCTIONS
Qty: 0.5 ML | Refills: 0 | Status: SHIPPED | OUTPATIENT
Start: 2023-02-08 | End: 2023-02-09

## 2023-02-08 RX ORDER — PREGABALIN 75 MG/1
75 CAPSULE ORAL 2 TIMES DAILY
Qty: 60 CAPSULE | Status: CANCELLED | OUTPATIENT
Start: 2023-02-08

## 2023-02-08 SDOH — ECONOMIC STABILITY: FOOD INSECURITY: WITHIN THE PAST 12 MONTHS, YOU WORRIED THAT YOUR FOOD WOULD RUN OUT BEFORE YOU GOT MONEY TO BUY MORE.: NEVER TRUE

## 2023-02-08 SDOH — ECONOMIC STABILITY: HOUSING INSECURITY
IN THE LAST 12 MONTHS, WAS THERE A TIME WHEN YOU DID NOT HAVE A STEADY PLACE TO SLEEP OR SLEPT IN A SHELTER (INCLUDING NOW)?: NO

## 2023-02-08 SDOH — ECONOMIC STABILITY: INCOME INSECURITY: HOW HARD IS IT FOR YOU TO PAY FOR THE VERY BASICS LIKE FOOD, HOUSING, MEDICAL CARE, AND HEATING?: NOT VERY HARD

## 2023-02-08 SDOH — ECONOMIC STABILITY: FOOD INSECURITY: WITHIN THE PAST 12 MONTHS, THE FOOD YOU BOUGHT JUST DIDN'T LAST AND YOU DIDN'T HAVE MONEY TO GET MORE.: NEVER TRUE

## 2023-02-08 ASSESSMENT — ENCOUNTER SYMPTOMS
WHEEZING: 0
BLOOD IN STOOL: 0
SHORTNESS OF BREATH: 0
ANAL BLEEDING: 0
CHOKING: 0
COUGH: 0
DIARRHEA: 0
VOMITING: 0
CHEST TIGHTNESS: 0
CONSTIPATION: 0
NAUSEA: 0
ABDOMINAL PAIN: 0

## 2023-02-08 ASSESSMENT — VISUAL ACUITY: OU: 1

## 2023-02-08 NOTE — PATIENT INSTRUCTIONS
For the next two weeks, decrease amlodipine to 5 mg daily instead of 10mg, take half a tablet. Start checking your blood pressure thereafter and let me know if it does start to go back up, and we can increase back to amlodipine 10mg daily. If not, then stay on the 5mg daily.

## 2023-02-08 NOTE — PROGRESS NOTES
Pt is here today for a regular f/u     Pt had back surgery  on 01/09/2023, ps states everything is going okay, sciatica still acting up just not as bad     Pt states he has been feeling lethargic, when pt stands up he does feel a little light headed sometimes, has been going on since the surgery, so about a month ago

## 2023-02-08 NOTE — PROGRESS NOTES
[facility]  [facility] [address]  [address] [address]  Dept: [phone]  Dept Fax: [phone]      Mara Quevedo is a 46 y.o. male who presents today for hismedical conditions/complaints as noted below. Mara Quevedo is c/o of Hypertension (F/u)        Assessment/Plan:     1. Lumbar stenosis with neurogenic claudication  2. Primary hypertension  -     hydroCHLOROthiazide (HYDRODIURIL) 25 MG tablet; Take 1 tablet by mouth every morning, Disp-90 tablet, R-1Normal  -     amLODIPine (NORVASC) 10 MG tablet; TAKE 1 TABLET BY MOUTH DAILY, Disp-90 tablet, R-1Normal  -     lisinopril (PRINIVIL;ZESTRIL) 40 MG tablet; Take 1 tablet by mouth daily, Disp-90 tablet, R-1Normal  3. Diabetes insipidus (HCC)  -     hydroCHLOROthiazide (HYDRODIURIL) 25 MG tablet; Take 1 tablet by mouth every morning, Disp-90 tablet, R-1Normal  4. Gastroesophageal reflux disease without esophagitis  -     pantoprazole (PROTONIX) 20 MG tablet; pantoprazole 20 mg tablet,delayed release, Disp-90 tablet, R-1Normal  5. Need for shingles vaccine  -     zoster recombinant adjuvanted vaccine [facility]) 50 MCG/0.5ML SUSR injection; Inject 0.5 mLs into the muscle See Admin Instructions for 1 day, Disp-0.5 mL, R-0Normal      Patient Instructions   For the next two weeks, decrease amlodipine to 5 mg daily instead of 10mg, take half a tablet. Start checking your blood pressure thereafter and let me know if it does start to go back up, and we can increase back to amlodipine 10mg daily. If not, then stay on the 5mg daily. No follow-ups on file. HPI     Underwent Posterior Interbody Fusion Lumbar Single Level L4-5 on 1/9/2023 with Dr Jolene Camejo - walking through the house, goal is to be at 1 mile, will see him later this month for follow-up. Pain is not as severe as it was, continues to have R gluteal area pain radiating to the thigh. Some dizziness when he first stands up - three episodes since surgery. Hypertension-tolerating current regimen without chest pain, palpitations, dizziness, peripheral edema, dyspnea on exertion, orthopnea, paroxysmal nocturnal dyspnea. Hyperlipidemia-tolerating current regimen without myalgias, dyspepsia, jaundice. Mostly compliant with diet recommendations for low salt diet, tries to limit greasy/cheesy/fried foods, not very compliant with exercise recommendations. Cardiovascular risk factors: dyslipidemia, hypertension, male gender, obesity (BMI >= 30 kg/m2), and sedentary lifestyle        BP Readings from Last 3 Encounters:   23 100/66   23 120/84   22 110/76              Past Medical History:   Diagnosis Date    Arthritis     lumbar stenosis    Bipolar disorder ([facility]     carlene melendez NP, and romeo oquendo- psych services    BPH (benign prostatic hyperplasia)     Cancer ([facility]     kidney    Diverticulosis     colonoscopy 10/2012    GERD (gastroesophageal reflux disease)     HLD (hyperlipidemia)     HTN (hypertension)     Internal hemorrhoid     colonoscopy 10/2012    SCOT (obstructive sleep apnea)       Past Surgical History:   Procedure Laterality Date    COLONOSCOPY  10/2012    KIDNEY SURGERY      removed cancer spot    KNEE SURGERY      torn meniscus    PRE-MALIGNANT / BENIGN SKIN LESION EXCISION  06/10/2022    POSTERIOR SCALP CYSTIC MASS EXCISION WITH COMPLEX CLOSURE     PRE-MALIGNANT / BENIGN SKIN LESION EXCISION N/A 6/10/2022    POSTERIOR SCALP CYSTIC MASS EXCISION WITH COMPLEX CLOSURE performed by Ragini Hardin MD at [address] [place] [address].       No family history on file.     Social History     Tobacco Use    Smoking status: Former     Packs/day: 1.00     Years: 15.00     Pack years: 15.00     Types: Cigarettes     Quit date: 2018     Years since quittin.6    Smokeless tobacco: Never   Substance Use Topics    Alcohol use: Not Currently     Comment: not for 3 weeks        Allergies   Allergen Reactions    No Known Allergies      Prior to Visit Medications    Medication Sig Taking? Authorizing Provider   chlorhexidine (PERIDEX) 0.12 % solution  Yes Historical Provider, MD   pramipexole (MIRAPEX) 0.25 MG tablet Take 1 tablet by mouth nightly Yes Joann Wharton MD   hydrOXYzine HCl (ATARAX) 50 MG tablet Take 1 tablet by mouth nightly Yes Joann Wharton MD   aMILoride (MIDAMOR) 5 MG tablet Take 1 tablet by mouth daily Yes Alissa Dillard MD   pregabalin (LYRICA) 75 MG capsule Take 75 mg by mouth in the morning and at bedtime. Yes Historical Provider, MD   pantoprazole (PROTONIX) 20 MG tablet pantoprazole 20 mg tablet,delayed release Yes Joann Wharton MD   lisinopril (PRINIVIL;ZESTRIL) 40 MG tablet Take 1 tablet by mouth in the morning.  Yes Joann Wharton MD   amLODIPine (NORVASC) 10 MG tablet TAKE 1 TABLET BY MOUTH DAILY Yes Joann Wharton MD   hydroCHLOROthiazide (HYDRODIURIL) 25 MG tablet Take 1 tablet by mouth every morning Yes Joann Wharton MD   Misc Natural Products (GLUCOSAMINE CHOND CMP TRIPLE) TABS Take 2 tablets by mouth every evening Yes Historical Provider, MD   Multiple Vitamins-Minerals (MULTIVITAMIN ADULT EXTRA C PO) multivitamin   1 tablet by mouth once daily Yes Historical Provider, MD   acetaminophen (TYLENOL) 500 MG tablet every 6 hours as needed  Yes Historical Provider, MD   OXcarbazepine (TRILEPTAL) 600 MG tablet oxcarbazepine 600 mg tablet Yes Historical Provider, MD   buPROPion (WELLBUTRIN XL) 150 MG extended release tablet bupropion HCl  mg 24 hr tablet, extended release Yes Historical Provider, MD   buPROPion (WELLBUTRIN XL) 300 MG extended release tablet bupropion HCl  mg 24 hr tablet, extended release Yes Historical Provider, MD   FLUoxetine (PROZAC) 40 MG capsule fluoxetine 40 mg capsule Yes Historical Provider, MD   tamsulosin (FLOMAX) 0.4 MG capsule tamsulosin 0.4 mg capsule Yes Historical Provider, MD   celecoxib (CELEBREX) 100 MG capsule Take 1 capsule by mouth daily  Patient taking differently: Take 100 mg by mouth 2 times daily  Joann Kurtz MD   Cholecalciferol (VITAMIN D3) 125 MCG (5000 UT) TABS Take by mouth  Patient not taking: Reported on 2/8/2023  Historical Provider, MD   aspirin-acetaminophen-caffeine (Marlyn Roberts) 273-251-85 MG per tablet Take 1 tablet by mouth every 6 hours as needed for Headaches  Patient not taking: No sig reported  Historical Provider, MD       Review of Systems     Review of Systems   Constitutional:  Negative for fatigue, fever and unexpected weight change. Respiratory:  Negative for cough, choking, chest tightness, shortness of breath and wheezing. Cardiovascular:  Negative for chest pain, palpitations and leg swelling. Gastrointestinal:  Negative for abdominal pain, anal bleeding, blood in stool, constipation, diarrhea, nausea and vomiting. Endocrine: Negative. Musculoskeletal:  Negative for joint swelling and myalgias. Skin: Negative. Neurological:  Negative for dizziness. Psychiatric/Behavioral:  Negative for sleep disturbance. All other systems reviewed and are negative. Objective     /66 (Site: Right Upper Arm, Position: Sitting, Cuff Size: Large Adult)   Pulse 93   Temp 97.1 °F (36.2 °C)   Wt 211 lb (95.7 kg)   SpO2 98%   BMI 30.28 kg/m²   Physical Exam  Vitals and nursing note reviewed. Constitutional:       General: He is not in acute distress. Appearance: He is well-developed. He is not ill-appearing. Eyes:      General: Lids are normal. Vision grossly intact. Cardiovascular:      Rate and Rhythm: Normal rate and regular rhythm. Heart sounds: Normal heart sounds, S1 normal and S2 normal. No murmur heard. No friction rub. No gallop. Pulmonary:      Effort: Pulmonary effort is normal. No respiratory distress. Breath sounds: Normal breath sounds. No wheezing. Abdominal:      General: Bowel sounds are normal.      Palpations: Abdomen is soft. There is no mass. Tenderness:  There is no abdominal tenderness. There is no guarding. Musculoskeletal:         General: Normal range of motion. Skin:     General: Skin is warm and dry. Capillary Refill: Capillary refill takes less than 2 seconds. Neurological:      General: No focal deficit present. Mental Status: He is alert and oriented to person, place, and time. Data Review     NSGY notes reviewed in chart     Health Maintenance Due   Topic Date Due    Shingles vaccine (1 of 2) Never done           Patient given educational materials- see patient instructions. Discussed use, benefit, and side effects of prescribedmedications. All patient questions answered. Pt voiced understanding. Reviewedhealth maintenance. Instructed to continue current medications, diet and exercise. Patient agreed with treatment plan. Follow up as directed.      Electronically signedby Tracy Escobar MD on 2/8/2023

## 2023-04-26 ENCOUNTER — OFFICE VISIT (OUTPATIENT)
Dept: SURGERY | Age: 53
End: 2023-04-26
Payer: MEDICARE

## 2023-04-26 VITALS — HEIGHT: 70 IN | WEIGHT: 218.4 LBS | BODY MASS INDEX: 31.26 KG/M2

## 2023-04-26 DIAGNOSIS — L72.9 INFECTED CYST OF SKIN: ICD-10-CM

## 2023-04-26 DIAGNOSIS — L08.9 INFECTED CYST OF SKIN: ICD-10-CM

## 2023-04-26 DIAGNOSIS — D48.5 NEOPLASM OF UNCERTAIN BEHAVIOR OF SKIN: Primary | ICD-10-CM

## 2023-04-26 PROCEDURE — 99212 OFFICE O/P EST SF 10 MIN: CPT | Performed by: PLASTIC SURGERY

## 2023-04-26 RX ORDER — LURASIDONE HYDROCHLORIDE 80 MG/1
80 TABLET, FILM COATED ORAL DAILY
COMMUNITY

## 2023-04-26 NOTE — PROGRESS NOTES
Wt 218 lb 6.4 oz (99.1 kg)   BMI 31.34 kg/m²    Body mass index is 31.34 kg/m². Physical Exam   Constitutional: Oriented to person, place, and time. Appears well-developed and well-nourished. No distress. HEENT: Normocephalic and atraumatic. External ears within normal limits. Extraocular muscles are intact. Conjunctivae were anicteric. Pulmonary/Chest: Effort normal. No respiratory distress. Neurological: Alert and oriented to person, place, and time. Skin: Scar is well-healed. Patient is getting some hypertrophic scarring on the medial aspect of the suture line. This has improved. Patient has a new skin lesion on the right temporal scalp. It appears to be seborrheic keratosis. Lesion appears unchanged from the photograph. Extremities:  Moves all extremities. Abdomen:  Soft. Psychiatric: Normal mood and affect. Behavior is normal.    Labs:     Component 6/10/22 1529    Dermatology Pathology Report -- Diagnosis --   SKIN, POSTERIOR SCALP, EXCISION:        -  INFLAMED EPIDERMOID CYST WITH DERMAL SCAR. [facility] Chirstiano Merida M.D., [address] [place] [address]. AP/CP, Dermatopathology   **Electronically Signed Out**   jet/6/13/2022         Clinical Information   Pre-op Diagnosis:  CYSTIC MASS   Operative Findings:  POSTERIOR SCALP MASS  SUTURE AT 12:00   Operation Performed:  EXCISION WITH COMPLEX CLOSURE     Source of Specimen   A: POSTERIOR SCALP MASS     Gross Description   \"ZAYDA GARCIA, POSTERIOR SCALP MASS SUTURE AT 12:00\"  It consists   of a 1.5 x 0.7 cm tan, hair bearing skin ellipse excised to a depth of   0.6 cm. The epidermal surface displays a 0.2 cm apparent punctum 0.1   cm from the 12:00 margin. A suture is present along one of the long   edges designating 12:00. The 12:00 half is inked blue and the 6:00   half is inked green. The specimen is sectioned from 3:00 to 9:00 to   reveal fibrotic cut surfaces with embedded hair.   The specimen is   submitted entirely as follows:       Cassette Summary:

## 2023-05-08 ENCOUNTER — OFFICE VISIT (OUTPATIENT)
Dept: FAMILY MEDICINE CLINIC | Age: 53
End: 2023-05-08
Payer: MEDICARE

## 2023-05-08 VITALS
TEMPERATURE: 97.9 F | BODY MASS INDEX: 31.54 KG/M2 | OXYGEN SATURATION: 95 % | WEIGHT: 219.8 LBS | DIASTOLIC BLOOD PRESSURE: 78 MMHG | HEART RATE: 92 BPM | SYSTOLIC BLOOD PRESSURE: 102 MMHG

## 2023-05-08 DIAGNOSIS — F51.01 PRIMARY INSOMNIA: ICD-10-CM

## 2023-05-08 DIAGNOSIS — E23.2 DIABETES INSIPIDUS (HCC): ICD-10-CM

## 2023-05-08 DIAGNOSIS — I10 PRIMARY HYPERTENSION: ICD-10-CM

## 2023-05-08 PROCEDURE — 3078F DIAST BP <80 MM HG: CPT | Performed by: INTERNAL MEDICINE

## 2023-05-08 PROCEDURE — 99214 OFFICE O/P EST MOD 30 MIN: CPT | Performed by: INTERNAL MEDICINE

## 2023-05-08 PROCEDURE — 3074F SYST BP LT 130 MM HG: CPT | Performed by: INTERNAL MEDICINE

## 2023-05-08 RX ORDER — HYDROCHLOROTHIAZIDE 25 MG/1
12.5 TABLET ORAL EVERY MORNING
Qty: 90 TABLET | Refills: 1
Start: 2023-05-08

## 2023-05-08 RX ORDER — HYDROXYZINE 50 MG/1
50 TABLET, FILM COATED ORAL NIGHTLY
Qty: 90 TABLET | Refills: 1 | Status: SHIPPED | OUTPATIENT
Start: 2023-05-08

## 2023-05-08 NOTE — PROGRESS NOTES
Pt is here today for a regular 3 mo f/u    Pt states his BP has been low, does check it at home, pt has been feeling light headed and dizzy, having SOB,   No chest pain, no numbing/tingling, no ringing in ears,

## 2023-05-08 NOTE — PATIENT INSTRUCTIONS
Decrease hydrochlorothiazide to 12.5mg - take half a pill daily of current 25mg dose daily and let me know in a couple weeks.

## 2023-05-08 NOTE — PROGRESS NOTES
[facility] PHYSICIANS  [facility] [address]  [address] [facility] [address]  Dept: [phone]  Dept Fax: [phone]      Hunter Dejesus is a 46 y.o. male who presents today for hismedical conditions/complaints as noted below. Hunter Dejesus is c/o of Hypertension (F/u), Gastroesophageal Reflux (F/u), and Hyperlipidemia (F/u)        Assessment/Plan:     1. Primary insomnia  -     hydrOXYzine HCl (ATARAX) 50 MG tablet; Take 1 tablet by mouth nightly, Disp-90 tablet, R-1Normal  2. Primary hypertension  -     hydroCHLOROthiazide (HYDRODIURIL) 25 MG tablet; Take 0.5 tablets by mouth every morning, Disp-90 tablet, R-1Adjust Sig  3. Diabetes insipidus (HCC)  -     hydroCHLOROthiazide (HYDRODIURIL) 25 MG tablet; Take 0.5 tablets by mouth every morning, Disp-90 tablet, R-1Adjust Sig    Patient Instructions   Decrease hydrochlorothiazide to 12.5mg - take half a pill daily of current 25mg dose daily and let me know in a couple weeks. No follow-ups on file. HPI     Hypertension-tolerating current regimen without chest pain, palpitations, peripheral edema, dyspnea on exertion, orthopnea, paroxysmal nocturnal dyspnea. Intermittent dizziness and fatigue. Hyperlipidemia-tolerating current regimen without myalgias, dyspepsia, jaundice. GERD - well controlled with pantoprazole. Denies heartburn with food, nocturnal reflux, dysphagia, weight changes, anorexia, melena, hematochezia, diarrhea, nausea, vomiting. Mostly compliant with diet recommendations for low salt diet, tries to limit greasy/cheesy/fried foods, not very compliant with exercise recommendations.     Cardiovascular risk factors: advanced age (older than 54 for men, 72 for women), dyslipidemia, hypertension, male gender, and sedentary lifestyle        BP Readings from Last 3 Encounters:   05/08/23 102/78   02/08/23 100/66   01/03/23 120/84              Past Medical History:   Diagnosis Date    Arthritis     lumbar stenosis

## 2023-07-02 DIAGNOSIS — G25.81 RESTLESS LEG: ICD-10-CM

## 2023-07-03 RX ORDER — PRAMIPEXOLE DIHYDROCHLORIDE 0.25 MG/1
TABLET ORAL
Qty: 90 TABLET | Refills: 1 | Status: SHIPPED | OUTPATIENT
Start: 2023-07-03

## 2023-07-03 NOTE — TELEPHONE ENCOUNTER
Last visit: 5/8/23  Last Med refill: 1/3/23  Does patient have enough medication for 72 hours: No:     Next Visit Date:  Future Appointments   Date Time Provider [address]   8/8/2023 10:45 AM Joann Talbot MD [place]   9/18/2023  1:00 PM Stacey Billings MD  derm [address] Maintenance   Topic Date Due    Annual Wellness Visit (AWV)  04/08/2023    Shingles vaccine (2 of 2) 05/03/2023    COVID-19 Vaccine (3 - Booster for Pfizer series) 04/07/2024 (Originally 9/18/2021)    Flu vaccine (1) 08/01/2023    Depression Monitoring  01/03/2024    Colorectal Cancer Screen  06/11/2025    Lipids  01/21/2027    DTaP/Tdap/Td vaccine (2 - Td or Tdap) 10/01/2030    Hepatitis C screen  Completed    HIV screen  Completed    Hepatitis A vaccine  Aged Out    Hib vaccine  Aged Out    Meningococcal (ACWY) vaccine  Aged Out    Pneumococcal 0-64 years Vaccine  Aged Out    Depression Screen  Discontinued    Diabetes screen  Discontinued       Hemoglobin A1C (%)   Date Value   01/21/2022 5.0   05/11/2017 4.7   12/22/2016 4.6             ( goal A1C is < 7)   Microalb/Crt.  Ratio (mcg/mg creat)   Date Value   06/16/2016 9     LDL Cholesterol (mg/dL)   Date Value   01/21/2022 118   10/04/2017 91       (goal LDL is <100)   AST (U/L)   Date Value   01/21/2022 21     ALT (U/L)   Date Value   01/21/2022 25     BUN (mg/dL)   Date Value   06/06/2022 18     BP Readings from Last 3 Encounters:   05/08/23 102/78   02/08/23 100/66   01/03/23 120/84          (goal 120/80)    All Future Testing planned in [facility]  Lab Frequency Next Occurrence               Patient Active Problem List:     Bipolar disorder ([address])     HTN (hypertension)     Change in bowel habits     Diverticulosis     Internal hemorrhoid     Mixed hyperlipidemia     Bipolar disorder ([address])     Diabetes insipidus ([address])     Spinal stenosis of lumbar region     Primary insomnia     History of renal cell carcinoma     Scalp cyst     Restless leg

## 2023-07-07 DIAGNOSIS — E23.2 DIABETES INSIPIDUS (HCC): ICD-10-CM

## 2023-07-07 DIAGNOSIS — I10 PRIMARY HYPERTENSION: ICD-10-CM

## 2023-07-07 RX ORDER — HYDROCHLOROTHIAZIDE 25 MG/1
12.5 TABLET ORAL EVERY MORNING
Qty: 90 TABLET | Refills: 1 | OUTPATIENT
Start: 2023-07-07

## 2023-08-08 ENCOUNTER — OFFICE VISIT (OUTPATIENT)
Dept: FAMILY MEDICINE CLINIC | Age: 53
End: 2023-08-08
Payer: MEDICARE

## 2023-08-08 VITALS
HEART RATE: 90 BPM | OXYGEN SATURATION: 96 % | TEMPERATURE: 98.2 F | SYSTOLIC BLOOD PRESSURE: 120 MMHG | DIASTOLIC BLOOD PRESSURE: 82 MMHG | BODY MASS INDEX: 31.54 KG/M2 | WEIGHT: 219.8 LBS

## 2023-08-08 DIAGNOSIS — K21.9 GASTROESOPHAGEAL REFLUX DISEASE WITHOUT ESOPHAGITIS: ICD-10-CM

## 2023-08-08 DIAGNOSIS — I10 PRIMARY HYPERTENSION: Primary | ICD-10-CM

## 2023-08-08 DIAGNOSIS — F31.9 BIPOLAR AFFECTIVE DISORDER, REMISSION STATUS UNSPECIFIED (HCC): ICD-10-CM

## 2023-08-08 DIAGNOSIS — E23.2 DIABETES INSIPIDUS (HCC): ICD-10-CM

## 2023-08-08 DIAGNOSIS — F51.01 PRIMARY INSOMNIA: ICD-10-CM

## 2023-08-08 DIAGNOSIS — E78.2 MIXED HYPERLIPIDEMIA: ICD-10-CM

## 2023-08-08 DIAGNOSIS — M48.061 SPINAL STENOSIS OF LUMBAR REGION, UNSPECIFIED WHETHER NEUROGENIC CLAUDICATION PRESENT: ICD-10-CM

## 2023-08-08 PROCEDURE — 3074F SYST BP LT 130 MM HG: CPT | Performed by: INTERNAL MEDICINE

## 2023-08-08 PROCEDURE — 99214 OFFICE O/P EST MOD 30 MIN: CPT | Performed by: INTERNAL MEDICINE

## 2023-08-08 PROCEDURE — 3079F DIAST BP 80-89 MM HG: CPT | Performed by: INTERNAL MEDICINE

## 2023-08-08 RX ORDER — CELECOXIB 100 MG/1
200 CAPSULE ORAL DAILY
COMMUNITY

## 2023-08-08 RX ORDER — HYDROXYZINE 50 MG/1
50 TABLET, FILM COATED ORAL NIGHTLY
Qty: 90 TABLET | Refills: 1 | Status: SHIPPED | OUTPATIENT
Start: 2023-08-08

## 2023-08-08 RX ORDER — BUPROPION HYDROCHLORIDE 150 MG/1
150 TABLET ORAL DAILY
COMMUNITY
Start: 2023-08-01

## 2023-08-08 RX ORDER — AMLODIPINE BESYLATE 10 MG/1
TABLET ORAL
Qty: 90 TABLET | Refills: 1 | Status: SHIPPED | OUTPATIENT
Start: 2023-08-08

## 2023-08-08 RX ORDER — HYDROCHLOROTHIAZIDE 12.5 MG/1
12.5 TABLET ORAL EVERY MORNING
Qty: 90 TABLET | Refills: 1 | Status: SHIPPED | OUTPATIENT
Start: 2023-08-08

## 2023-08-08 RX ORDER — HYDROXYZINE 50 MG/1
50 TABLET, FILM COATED ORAL NIGHTLY
Qty: 90 TABLET | Refills: 1 | Status: CANCELLED | OUTPATIENT
Start: 2023-08-08

## 2023-08-08 RX ORDER — LISINOPRIL 40 MG/1
40 TABLET ORAL DAILY
Qty: 90 TABLET | Refills: 1 | Status: SHIPPED | OUTPATIENT
Start: 2023-08-08

## 2023-08-08 RX ORDER — AMILORIDE HYDROCHLORIDE 5 MG/1
5 TABLET ORAL DAILY
COMMUNITY
Start: 2023-06-17

## 2023-08-08 RX ORDER — PANTOPRAZOLE SODIUM 20 MG/1
TABLET, DELAYED RELEASE ORAL
Qty: 90 TABLET | Refills: 1 | Status: SHIPPED | OUTPATIENT
Start: 2023-08-08

## 2023-08-08 RX ORDER — PREGABALIN 150 MG/1
150 CAPSULE ORAL 2 TIMES DAILY
COMMUNITY
Start: 2023-07-17

## 2023-08-08 ASSESSMENT — ENCOUNTER SYMPTOMS
ABDOMINAL PAIN: 0
VOMITING: 0
NAUSEA: 0
CHOKING: 0
ANAL BLEEDING: 0
WHEEZING: 0
BLOOD IN STOOL: 0
COUGH: 0
CHEST TIGHTNESS: 0
SHORTNESS OF BREATH: 0
DIARRHEA: 0
CONSTIPATION: 0

## 2023-08-08 ASSESSMENT — VISUAL ACUITY: OU: 1

## 2023-08-09 DIAGNOSIS — I10 PRIMARY HYPERTENSION: ICD-10-CM

## 2023-08-09 RX ORDER — LISINOPRIL 40 MG/1
TABLET ORAL
Qty: 90 TABLET | Refills: 1 | OUTPATIENT
Start: 2023-08-09

## 2023-08-16 DIAGNOSIS — K21.9 GASTROESOPHAGEAL REFLUX DISEASE WITHOUT ESOPHAGITIS: ICD-10-CM

## 2023-08-16 RX ORDER — PANTOPRAZOLE SODIUM 20 MG/1
TABLET, DELAYED RELEASE ORAL
Qty: 90 TABLET | Refills: 1 | OUTPATIENT
Start: 2023-08-16

## 2023-08-17 DIAGNOSIS — K21.9 GASTROESOPHAGEAL REFLUX DISEASE WITHOUT ESOPHAGITIS: ICD-10-CM

## 2023-08-18 RX ORDER — PANTOPRAZOLE SODIUM 20 MG/1
TABLET, DELAYED RELEASE ORAL
Qty: 90 TABLET | Refills: 1 | OUTPATIENT
Start: 2023-08-18

## 2023-08-30 ENCOUNTER — HOSPITAL ENCOUNTER (OUTPATIENT)
Age: 53
Setting detail: SPECIMEN
Discharge: HOME | End: 2023-08-30

## 2023-08-30 DIAGNOSIS — E23.2 DIABETES INSIPIDUS (HCC): ICD-10-CM

## 2023-08-30 DIAGNOSIS — E78.2 MIXED HYPERLIPIDEMIA: ICD-10-CM

## 2023-08-30 LAB
ALBUMIN SERPL-MCNC: 4.1 G/DL (ref 3.5–5.2)
ALBUMIN/GLOB SERPL: 1.6 {RATIO} (ref 1–2.5)
ALP SERPL-CCNC: 136 U/L (ref 40–129)
ALT SERPL-CCNC: 21 U/L (ref 5–41)
ANION GAP SERPL CALCULATED.3IONS-SCNC: 13 MMOL/L (ref 9–17)
AST SERPL-CCNC: 30 U/L
BILIRUB SERPL-MCNC: <0.1 MG/DL (ref 0.3–1.2)
BUN SERPL-MCNC: 14 MG/DL (ref 6–20)
CALCIUM SERPL-MCNC: 9.1 MG/DL (ref 8.6–10.4)
CHLORIDE SERPL-SCNC: 106 MMOL/L (ref 98–107)
CHOLEST SERPL-MCNC: 252 MG/DL
CHOLESTEROL/HDL RATIO: 8.1
CO2 SERPL-SCNC: 20 MMOL/L (ref 20–31)
CREAT SERPL-MCNC: 1 MG/DL (ref 0.7–1.2)
GFR SERPL CREATININE-BSD FRML MDRD: >60 ML/MIN/1.73M2
GLUCOSE SERPL-MCNC: 116 MG/DL (ref 70–99)
HDLC SERPL-MCNC: 31 MG/DL
LDLC SERPL CALC-MCNC: ABNORMAL MG/DL (ref 0–130)
LDLC SERPL DIRECT ASSAY-MCNC: 126 MG/DL
POTASSIUM SERPL-SCNC: 4.2 MMOL/L (ref 3.7–5.3)
PROT SERPL-MCNC: 6.7 G/DL (ref 6.4–8.3)
SODIUM SERPL-SCNC: 139 MMOL/L (ref 135–144)
TRIGL SERPL-MCNC: 1286 MG/DL

## 2023-09-13 DIAGNOSIS — E78.5 DYSLIPIDEMIA: Primary | ICD-10-CM

## 2023-09-13 DIAGNOSIS — R73.9 ELEVATED BLOOD SUGAR: ICD-10-CM

## 2023-09-13 RX ORDER — ATORVASTATIN CALCIUM 20 MG/1
20 TABLET, FILM COATED ORAL DAILY
Qty: 90 TABLET | Refills: 1 | Status: SHIPPED | OUTPATIENT
Start: 2023-09-13

## 2023-09-15 NOTE — TELEPHONE ENCOUNTER
Last visit: 08/08/2023  Last Med refill: 06/18/2023  Does patient have enough medication for 72 hours: No:     Next Visit Date:  Future Appointments   Date Time Provider [address]   9/18/2023  1:00 PM Anatoliy Newton MD [facility]   2/8/2024  1:00 PM Joann Lopez MD [address] Maintenance   Topic Date Due    Hepatitis B vaccine (1 of 3 - 3-dose series) Never done    Annual Wellness Visit (AWV)  04/08/2023    Flu vaccine (1) 08/01/2023    COVID-19 Vaccine (3 - Pfizer series) 04/07/2024 (Originally 9/18/2021)    Depression Monitoring  01/03/2024    Lipids  08/30/2024    Diabetes screen  01/21/2025    Colorectal Cancer Screen  06/11/2025    DTaP/Tdap/Td vaccine (2 - Td or Tdap) 10/01/2030    Shingles vaccine  Completed    Hepatitis C screen  Completed    HIV screen  Completed    Hepatitis A vaccine  Aged Out    Hib vaccine  Aged Out    Meningococcal (ACWY) vaccine  Aged Out    Pneumococcal 0-64 years Vaccine  Aged Out    Depression Screen  Discontinued       Hemoglobin A1C (%)   Date Value   01/21/2022 5.0   05/11/2017 4.7   12/22/2016 4.6             ( goal A1C is < 7)   No components found for: \"LABMICR\"  LDL Cholesterol (mg/dL)   Date Value   08/30/2023 01/21/2022 118       (goal LDL is <100)   AST (U/L)   Date Value   08/30/2023 30     ALT (U/L)   Date Value   08/30/2023 21     BUN (mg/dL)   Date Value   08/30/2023 14     BP Readings from Last 3 Encounters:   08/08/23 120/82   05/08/23 102/78   02/08/23 100/66          (goal 120/80)    All Future Testing planned in [facility]  Lab Frequency Next Occurrence   Hemoglobin A1C Once 12/13/2023   Lipid, Fasting Once 12/13/2023   Hepatic Function Panel Once 12/13/2023               Patient Active Problem List:     Bipolar disorder ([address])     HTN (hypertension)     Change in bowel habits     Diverticulosis     Internal hemorrhoid     Mixed hyperlipidemia     Bipolar disorder ([address])     Diabetes insipidus ([address])     Spinal stenosis

## 2023-09-18 ENCOUNTER — OFFICE VISIT (OUTPATIENT)
Dept: DERMATOLOGY | Age: 53
End: 2023-09-18

## 2023-09-18 VITALS
DIASTOLIC BLOOD PRESSURE: 88 MMHG | TEMPERATURE: 98.1 F | HEIGHT: 70 IN | WEIGHT: 218 LBS | SYSTOLIC BLOOD PRESSURE: 121 MMHG | BODY MASS INDEX: 31.21 KG/M2 | HEART RATE: 71 BPM | OXYGEN SATURATION: 97 %

## 2023-09-18 DIAGNOSIS — L82.1 SEBORRHEIC KERATOSES: Primary | ICD-10-CM

## 2023-09-18 DIAGNOSIS — L82.0 INFLAMED SEBORRHEIC KERATOSIS: ICD-10-CM

## 2023-09-18 DIAGNOSIS — L72.9 CYST OF SKIN: ICD-10-CM

## 2023-09-18 RX ORDER — AMILORIDE HYDROCHLORIDE 5 MG/1
5 TABLET ORAL DAILY
Qty: 90 TABLET | Refills: 3 | Status: SHIPPED | OUTPATIENT
Start: 2023-09-18

## 2023-09-18 NOTE — PROGRESS NOTES
(SPF) of 27 or higher. It is important to check the ingredient label to be sure the sunscreen will protect the skin from both UVA and UVB sunrays. Your sunscreen should contain at least one of the following ingredients: titanium dioxide, zinc oxide, or avobenzone. Sunscreen will not be effective unless it is applied to all exposed skin. Sunscreens work best if they are applied 30 minutes before sun exposure. They should be reapplied every 2 hours and after any water exposure. Sunscreen is not perfect. It is important to use other methods to protect the skin from sun exposure also. Wear hats, sunglasses and other sun protective clothing when outdoors. Stay in the shade during the peak hours of sun exposure between 10 AM and 4 PM.    Seborrheic Keratosis (wisdom spots)  Seborrheic keratoses are common benign growths of unknown cause seen in adults due to a thickening of an area of the top skin layer. Who's At Risk  Although they can occur anytime after puberty, almost everyone over 48 has one or more of these and they increase in number with age. Some families have an inherited tendency to grow multiple lesions. Men and women are equally as likely to develop seborrheic keratoses. Dark-skinned people are less affected than those with light skin; a variant seen in blacks is called dermatosis papulosa nigra. Signs & Symptoms  One or more spots can occur anywhere on the body, except for palms, soles, and mucous membranes (eg, in the mouth or rectum). They do not go away. They do not turn into cancers, but some cancers resemble seborrheic keratosis. They start as light brown to skin-colored, flat areas, which are round to oval and of varying size (usually less than a half inch, but sometimes much larger). As they grow thicker and rise above the skin surface, seborrheic keratoses may become dark brown to almost black with a \"stuck on\" appearance. The surface may feel smooth or rough.   Self-Care

## 2023-09-18 NOTE — PATIENT INSTRUCTIONS
I recommend speaking to Dr. Deacon Serrano regarding cyst on right cheek    Cryotherapy    Liquid Nitrogen - \"freeze\" (Cryotherapy)  Your doctor has treated your skin lesions with a very cold substance. The liquid nitrogen is so cold that it may feel like the skin is burning during application. A clear blister or blood blister may form after treatment and may later form a scab. Leave the area alone. Usually this scab will fall of within 1-2 weeks. The area should be kept clean and can be covered with Vaseline and a Band-Aid if needed. If a large blister develops it is ok to use a clean needle to gently pop the blister. Please call our office with any concerns at [phone]. Sun Protection     There are two types of sun rays that are harmful to the skin. UVA rays cause skin aging and skin cancer, such as melanoma. UVB rays cause sunburns, cataracts, and also contribute to skin cancer. The American-Academy of Dermatology recommends that children and adults wear a broad spectrum, waterproof sunscreen with a Sun Protection Factor (SPF) of 30 or higher. It is important to check the ingredient label to be sure the sunscreen will protect the skin from both UVA and UVB sunrays. Your sunscreen should contain at least one of the following ingredients: titanium dioxide, zinc oxide, or avobenzone. Sunscreen will not be effective unless it is applied to all exposed skin. Sunscreens work best if they are applied 30 minutes before sun exposure. They should be reapplied every 2 hours and after any water exposure. Sunscreen is not perfect. It is important to use other methods to protect the skin from sun exposure also. Wear hats, sunglasses and other sun protective clothing when outdoors.   Stay in the shade during the peak hours of sun exposure between 10 AM and 4 PM.    Seborrheic Keratosis (wisdom spots)  Seborrheic keratoses are common benign growths of unknown cause seen in adults due to a thickening of an

## 2024-01-29 ENCOUNTER — TELEPHONE (OUTPATIENT)
Dept: FAMILY MEDICINE CLINIC | Age: 54
End: 2024-01-29

## 2024-01-29 DIAGNOSIS — G25.81 RESTLESS LEG: ICD-10-CM

## 2024-01-29 NOTE — TELEPHONE ENCOUNTER
Last visit: 08/28/23  Last Med refill: 09/25/23  Does patient have enough medication for 72 hours: No:     Next Visit Date:  Future Appointments   Date Time Provider Department Center   2/8/2024  1:00 PM Joann Simmons MD [facility]  [facility]       Health Maintenance   Topic Date Due    Hepatitis B vaccine (1 of 3 - 3-dose series) Never done    Flu vaccine (1) 08/01/2023    COVID-19 Vaccine (3 - 2023-24 season) 09/01/2023    Annual Wellness Visit (Medicare Advantage)  01/01/2024    Depression Monitoring  01/03/2024    Lipids  08/30/2024    Diabetes screen  01/21/2025    Colorectal Cancer Screen  06/11/2025    DTaP/Tdap/Td vaccine (2 - Td or Tdap) 10/01/2030    Shingles vaccine  Completed    Hepatitis C screen  Completed    HIV screen  Completed    Hepatitis A vaccine  Aged Out    Hib vaccine  Aged Out    Polio vaccine  Aged Out    Meningococcal (ACWY) vaccine  Aged Out    Pneumococcal 0-64 years Vaccine  Aged Out    Depression Screen  Discontinued       Hemoglobin A1C (%)   Date Value   01/21/2022 5.0   05/11/2017 4.7   12/22/2016 4.6             ( goal A1C is < 7)   No components found for: \"LABMICR\"  LDL Cholesterol (mg/dL)   Date Value   08/30/2023 01/21/2022 118       (goal LDL is <100)   AST (U/L)   Date Value   08/30/2023 30     ALT (U/L)   Date Value   08/30/2023 21     BUN (mg/dL)   Date Value   08/30/2023 14     BP Readings from Last 3 Encounters:   12/22/23 125/82   09/18/23 121/88   08/08/23 120/82          (goal 120/80)    All Future Testing planned in [facility]  Lab Frequency Next Occurrence   Hemoglobin A1C Once 12/13/2023   Lipid, Fasting Once 12/13/2023   Hepatic Function Panel Once 12/13/2023               Patient Active Problem List:     Bipolar disorder (HCC)     HTN (hypertension)     Change in bowel habits     Diverticulosis     Internal hemorrhoid     Mixed hyperlipidemia     Bipolar disorder (HCC)     Diabetes insipidus (HCC)     Spinal stenosis of lumbar region     Primary

## 2024-01-30 RX ORDER — PRAMIPEXOLE DIHYDROCHLORIDE 0.25 MG/1
0.25 TABLET ORAL NIGHTLY
Qty: 90 TABLET | Refills: 1 | Status: SHIPPED | OUTPATIENT
Start: 2024-01-30

## 2024-02-04 DIAGNOSIS — I10 PRIMARY HYPERTENSION: ICD-10-CM

## 2024-02-05 RX ORDER — LISINOPRIL 40 MG/1
40 TABLET ORAL DAILY
Qty: 90 TABLET | Refills: 1 | Status: SHIPPED | OUTPATIENT
Start: 2024-02-05

## 2024-02-05 NOTE — TELEPHONE ENCOUNTER
Last visit: 08/08/2023  Last Med refill: 11/06/2023  Does patient have enough medication for 72 hours: No:     Next Visit Date:  Future Appointments   Date Time Provider Department Center   2/8/2024  1:00 PM Joann Simmons MD [facility]       Health Maintenance   Topic Date Due    Hepatitis B vaccine (1 of 3 - 3-dose series) Never done    Flu vaccine (1) 08/01/2023    COVID-19 Vaccine (3 - 2023-24 season) 09/01/2023    Annual Wellness Visit (Medicare Advantage)  01/01/2024    Depression Monitoring  01/03/2024    Lipids  08/30/2024    Diabetes screen  01/21/2025    Colorectal Cancer Screen  06/11/2025    DTaP/Tdap/Td vaccine (2 - Td or Tdap) 10/01/2030    Shingles vaccine  Completed    Hepatitis C screen  Completed    HIV screen  Completed    Hepatitis A vaccine  Aged Out    Hib vaccine  Aged Out    Polio vaccine  Aged Out    Meningococcal (ACWY) vaccine  Aged Out    Pneumococcal 0-64 years Vaccine  Aged Out    Depression Screen  Discontinued       Hemoglobin A1C (%)   Date Value   01/21/2022 5.0   05/11/2017 4.7   12/22/2016 4.6             ( goal A1C is < 7)   No components found for: \"LABMICR\"  LDL Cholesterol (mg/dL)   Date Value   08/30/2023 01/21/2022 118       (goal LDL is <100)   AST (U/L)   Date Value   08/30/2023 30     ALT (U/L)   Date Value   08/30/2023 21     BUN (mg/dL)   Date Value   08/30/2023 14     BP Readings from Last 3 Encounters:   12/22/23 125/82   09/18/23 121/88   08/08/23 120/82          (goal 120/80)    All Future Testing planned in [facility]  Lab Frequency Next Occurrence   Hemoglobin A1C Once 12/13/2023   Lipid, Fasting Once 12/13/2023   Hepatic Function Panel Once 12/13/2023               Patient Active Problem List:     Bipolar disorder (HCC)     HTN (hypertension)     Change in bowel habits     Diverticulosis     Internal hemorrhoid     Mixed hyperlipidemia     Bipolar disorder (HCC)     Diabetes insipidus (HCC)     Spinal stenosis of lumbar region     Primary

## 2024-02-08 ENCOUNTER — OFFICE VISIT (OUTPATIENT)
Dept: FAMILY MEDICINE CLINIC | Age: 54
End: 2024-02-08
Payer: MEDICARE

## 2024-02-08 ENCOUNTER — HOSPITAL ENCOUNTER (OUTPATIENT)
Age: 54
Setting detail: SPECIMEN
Discharge: HOME | End: 2024-02-08

## 2024-02-08 VITALS
TEMPERATURE: 97.7 F | SYSTOLIC BLOOD PRESSURE: 134 MMHG | HEART RATE: 91 BPM | BODY MASS INDEX: 31.88 KG/M2 | WEIGHT: 222.2 LBS | OXYGEN SATURATION: 97 % | DIASTOLIC BLOOD PRESSURE: 86 MMHG

## 2024-02-08 DIAGNOSIS — F51.01 PRIMARY INSOMNIA: ICD-10-CM

## 2024-02-08 DIAGNOSIS — E23.2 DIABETES INSIPIDUS (HCC): ICD-10-CM

## 2024-02-08 DIAGNOSIS — M65.4 RADIAL STYLOID TENOSYNOVITIS OF LEFT HAND: ICD-10-CM

## 2024-02-08 DIAGNOSIS — Z23 NEED FOR IMMUNIZATION AGAINST INFLUENZA: ICD-10-CM

## 2024-02-08 DIAGNOSIS — E78.2 MIXED HYPERLIPIDEMIA: ICD-10-CM

## 2024-02-08 DIAGNOSIS — M77.12 LEFT TENNIS ELBOW: ICD-10-CM

## 2024-02-08 DIAGNOSIS — R73.03 PREDIABETES: ICD-10-CM

## 2024-02-08 DIAGNOSIS — F31.9 BIPOLAR AFFECTIVE DISORDER, REMISSION STATUS UNSPECIFIED (HCC): ICD-10-CM

## 2024-02-08 DIAGNOSIS — K21.9 GASTROESOPHAGEAL REFLUX DISEASE WITHOUT ESOPHAGITIS: ICD-10-CM

## 2024-02-08 DIAGNOSIS — R73.9 ELEVATED BLOOD SUGAR: ICD-10-CM

## 2024-02-08 DIAGNOSIS — E78.5 DYSLIPIDEMIA: ICD-10-CM

## 2024-02-08 DIAGNOSIS — I10 PRIMARY HYPERTENSION: ICD-10-CM

## 2024-02-08 DIAGNOSIS — E23.2 DIABETES INSIPIDUS (HCC): Primary | ICD-10-CM

## 2024-02-08 LAB
ALBUMIN SERPL-MCNC: 3.9 G/DL (ref 3.5–5.2)
ALBUMIN/GLOB SERPL: 2 {RATIO} (ref 1–2.5)
ALP SERPL-CCNC: 97 U/L (ref 40–129)
ALT SERPL-CCNC: 22 U/L (ref 5–41)
ANION GAP SERPL CALCULATED.3IONS-SCNC: 7 MMOL/L (ref 9–17)
AST SERPL-CCNC: 20 U/L
BILIRUB DIRECT SERPL-MCNC: 0.1 MG/DL
BILIRUB INDIRECT SERPL-MCNC: 0.3 MG/DL (ref 0–1)
BILIRUB SERPL-MCNC: 0.4 MG/DL (ref 0.3–1.2)
BUN SERPL-MCNC: 13 MG/DL (ref 6–20)
CALCIUM SERPL-MCNC: 8.8 MG/DL (ref 8.6–10.4)
CHLORIDE SERPL-SCNC: 110 MMOL/L (ref 98–107)
CHOLEST SERPL-MCNC: 172 MG/DL
CHOLESTEROL/HDL RATIO: 4.1
CO2 SERPL-SCNC: 25 MMOL/L (ref 20–31)
CREAT SERPL-MCNC: 0.8 MG/DL (ref 0.7–1.2)
EST. AVERAGE GLUCOSE BLD GHB EST-MCNC: 105 MG/DL
GFR SERPL CREATININE-BSD FRML MDRD: >60 ML/MIN/1.73M2
GLUCOSE SERPL-MCNC: 106 MG/DL (ref 70–99)
HBA1C MFR BLD: 5.3 % (ref 4–6)
HDLC SERPL-MCNC: 42 MG/DL
LDLC SERPL CALC-MCNC: 61 MG/DL (ref 0–130)
POTASSIUM SERPL-SCNC: 4.6 MMOL/L (ref 3.7–5.3)
PROT SERPL-MCNC: 5.9 G/DL (ref 6.4–8.3)
SODIUM SERPL-SCNC: 142 MMOL/L (ref 135–144)
TRIGL SERPL-MCNC: 347 MG/DL

## 2024-02-08 PROCEDURE — 3075F SYST BP GE 130 - 139MM HG: CPT | Performed by: INTERNAL MEDICINE

## 2024-02-08 PROCEDURE — G0008 ADMIN INFLUENZA VIRUS VAC: HCPCS | Performed by: INTERNAL MEDICINE

## 2024-02-08 PROCEDURE — 3079F DIAST BP 80-89 MM HG: CPT | Performed by: INTERNAL MEDICINE

## 2024-02-08 PROCEDURE — 90674 CCIIV4 VAC NO PRSV 0.5 ML IM: CPT | Performed by: INTERNAL MEDICINE

## 2024-02-08 PROCEDURE — 99214 OFFICE O/P EST MOD 30 MIN: CPT | Performed by: INTERNAL MEDICINE

## 2024-02-08 RX ORDER — FLUOXETINE 10 MG/1
10 CAPSULE ORAL DAILY
COMMUNITY
Start: 2024-02-07

## 2024-02-08 RX ORDER — BUTYROSPERMUM PARKII(SHEA BUTTER), SIMMONDSIA CHINENSIS (JOJOBA) SEED OIL, ALOE BARBADENSIS LEAF EXTRACT .01; 1; 3.5 G/100G; G/100G; G/100G
5000 LIQUID TOPICAL DAILY
COMMUNITY
Start: 2023-12-01

## 2024-02-08 NOTE — PROGRESS NOTES
[facility]  [facility]  [address] [place] [id number]  Dept: [phone]  Dept Fax: [phone]      Colten Browne is a 53 y.o. male who presents today for hismedical conditions/complaints as noted below.  Colten Browne is c/o of Hypertension (F/u), Gastroesophageal Reflux (F/u), and Arm Pain (LT arm, goes for an EMG )        Assessment/Plan:     1. Diabetes insipidus (HCC)  -     Basic Metabolic Panel; Future  2. Need for immunization against influenza  -     Influenza, FLUCELVAX, (age 6 mo+), IM, Preservative Free, 0.5 mL  3. Left tennis elbow  -     External Referral To Orthopaedic Surgery  4. Radial styloid tenosynovitis of left hand  -     External Referral To Orthopaedic Surgery  5. Primary hypertension  6. Bipolar affective disorder, remission status unspecified (HCC)  7. Mixed hyperlipidemia  8. Prediabetes  9. Primary insomnia  10. Gastroesophageal reflux disease without esophagitis          No follow-ups on file.      HPI     He is seeing rheum at [facility]   Has been told there is nothing that can be done for tennis elbow, it is keeping him from sleeping or doing anything with the arm. Has had injections that havent worked.  Has not been referred to orthopedics for this.  GERD - well controlled with pantoprazole. Denies heartburn with food, nocturnal reflux, dysphagia, weight changes, anorexia, melena, hematochezia, diarrhea, nausea, vomiting.     Hypertension-tolerating current regimen without chest pain, palpitations, dizziness, peripheral edema, dyspnea on exertion, orthopnea, paroxysmal nocturnal dyspnea.  Hyperlipidemia-tolerating current regimen without myalgias, dyspepsia, jaundice.   Mostly compliant with diet recommendations for low salt diet, tries to limit greasy/cheesy/fried foods, not very compliant with exercise recommendations.    Cardiovascular risk factors: dyslipidemia, hypertension, male gender, obesity (BMI >= 30 kg/m2), and

## 2024-03-08 DIAGNOSIS — E78.5 DYSLIPIDEMIA: ICD-10-CM

## 2024-03-08 DIAGNOSIS — K21.9 GASTROESOPHAGEAL REFLUX DISEASE WITHOUT ESOPHAGITIS: ICD-10-CM

## 2024-03-11 RX ORDER — ATORVASTATIN CALCIUM 20 MG/1
20 TABLET, FILM COATED ORAL DAILY
Qty: 90 TABLET | Refills: 1 | Status: SHIPPED | OUTPATIENT
Start: 2024-03-11

## 2024-03-11 RX ORDER — PANTOPRAZOLE SODIUM 20 MG/1
TABLET, DELAYED RELEASE ORAL
Qty: 90 TABLET | Refills: 1 | Status: SHIPPED | OUTPATIENT
Start: 2024-03-11

## 2024-03-19 DIAGNOSIS — I10 PRIMARY HYPERTENSION: ICD-10-CM

## 2024-03-19 DIAGNOSIS — E23.2 DIABETES INSIPIDUS (HCC): ICD-10-CM

## 2024-03-20 RX ORDER — HYDROCHLOROTHIAZIDE 12.5 MG/1
12.5 TABLET ORAL EVERY MORNING
Qty: 90 TABLET | Refills: 1 | Status: SHIPPED | OUTPATIENT
Start: 2024-03-20

## 2024-03-20 RX ORDER — AMLODIPINE BESYLATE 10 MG/1
TABLET ORAL
Qty: 90 TABLET | Refills: 1 | Status: SHIPPED | OUTPATIENT
Start: 2024-03-20

## 2024-04-29 ENCOUNTER — OFFICE VISIT (OUTPATIENT)
Dept: FAMILY MEDICINE CLINIC | Age: 54
End: 2024-04-29

## 2024-04-29 VITALS
BODY MASS INDEX: 31.58 KG/M2 | TEMPERATURE: 98.1 F | DIASTOLIC BLOOD PRESSURE: 76 MMHG | OXYGEN SATURATION: 96 % | HEART RATE: 82 BPM | HEIGHT: 70 IN | WEIGHT: 220.6 LBS | SYSTOLIC BLOOD PRESSURE: 120 MMHG

## 2024-04-29 DIAGNOSIS — E23.2 DIABETES INSIPIDUS (HCC): ICD-10-CM

## 2024-04-29 DIAGNOSIS — K21.9 GASTROESOPHAGEAL REFLUX DISEASE WITHOUT ESOPHAGITIS: ICD-10-CM

## 2024-04-29 DIAGNOSIS — R35.1 BENIGN PROSTATIC HYPERPLASIA WITH NOCTURIA: ICD-10-CM

## 2024-04-29 DIAGNOSIS — E78.5 DYSLIPIDEMIA: ICD-10-CM

## 2024-04-29 DIAGNOSIS — Z00.00 MEDICARE ANNUAL WELLNESS VISIT, SUBSEQUENT: Primary | ICD-10-CM

## 2024-04-29 DIAGNOSIS — F51.01 PRIMARY INSOMNIA: ICD-10-CM

## 2024-04-29 DIAGNOSIS — F31.9 BIPOLAR AFFECTIVE DISORDER, REMISSION STATUS UNSPECIFIED (HCC): ICD-10-CM

## 2024-04-29 DIAGNOSIS — R73.03 PREDIABETES: ICD-10-CM

## 2024-04-29 DIAGNOSIS — Z71.89 ACP (ADVANCE CARE PLANNING): ICD-10-CM

## 2024-04-29 DIAGNOSIS — Z13.6 SCREENING FOR CARDIOVASCULAR CONDITION: ICD-10-CM

## 2024-04-29 DIAGNOSIS — I10 PRIMARY HYPERTENSION: ICD-10-CM

## 2024-04-29 DIAGNOSIS — N40.1 BENIGN PROSTATIC HYPERPLASIA WITH NOCTURIA: ICD-10-CM

## 2024-04-29 RX ORDER — TAMSULOSIN HYDROCHLORIDE 0.4 MG/1
0.4 CAPSULE ORAL DAILY
Qty: 30 CAPSULE | Refills: 5 | Status: SHIPPED | OUTPATIENT
Start: 2024-04-29

## 2024-04-29 SDOH — ECONOMIC STABILITY: FOOD INSECURITY: WITHIN THE PAST 12 MONTHS, THE FOOD YOU BOUGHT JUST DIDN'T LAST AND YOU DIDN'T HAVE MONEY TO GET MORE.: NEVER TRUE

## 2024-04-29 SDOH — ECONOMIC STABILITY: FOOD INSECURITY: WITHIN THE PAST 12 MONTHS, YOU WORRIED THAT YOUR FOOD WOULD RUN OUT BEFORE YOU GOT MONEY TO BUY MORE.: SOMETIMES TRUE

## 2024-04-29 SDOH — ECONOMIC STABILITY: TRANSPORTATION INSECURITY
IN THE PAST 12 MONTHS, HAS LACK OF TRANSPORTATION KEPT YOU FROM MEETINGS, WORK, OR FROM GETTING THINGS NEEDED FOR DAILY LIVING?: NO

## 2024-04-29 SDOH — ECONOMIC STABILITY: FOOD INSECURITY: WITHIN THE PAST 12 MONTHS, YOU WORRIED THAT YOUR FOOD WOULD RUN OUT BEFORE YOU GOT MONEY TO BUY MORE.: NEVER TRUE

## 2024-04-29 SDOH — ECONOMIC STABILITY: INCOME INSECURITY: HOW HARD IS IT FOR YOU TO PAY FOR THE VERY BASICS LIKE FOOD, HOUSING, MEDICAL CARE, AND HEATING?: SOMEWHAT HARD

## 2024-04-29 SDOH — ECONOMIC STABILITY: INCOME INSECURITY: HOW HARD IS IT FOR YOU TO PAY FOR THE VERY BASICS LIKE FOOD, HOUSING, MEDICAL CARE, AND HEATING?: NOT VERY HARD

## 2024-04-29 SDOH — ECONOMIC STABILITY: FOOD INSECURITY: WITHIN THE PAST 12 MONTHS, THE FOOD YOU BOUGHT JUST DIDN'T LAST AND YOU DIDN'T HAVE MONEY TO GET MORE.: SOMETIMES TRUE

## 2024-04-29 SDOH — HEALTH STABILITY: PHYSICAL HEALTH: ON AVERAGE, HOW MANY DAYS PER WEEK DO YOU ENGAGE IN MODERATE TO STRENUOUS EXERCISE (LIKE A BRISK WALK)?: 3 DAYS

## 2024-04-29 SDOH — HEALTH STABILITY: PHYSICAL HEALTH: ON AVERAGE, HOW MANY MINUTES DO YOU ENGAGE IN EXERCISE AT THIS LEVEL?: 120 MIN

## 2024-04-29 ASSESSMENT — PATIENT HEALTH QUESTIONNAIRE - PHQ9
5. POOR APPETITE OR OVEREATING: MORE THAN HALF THE DAYS
SUM OF ALL RESPONSES TO PHQ QUESTIONS 1-9: 16
6. FEELING BAD ABOUT YOURSELF - OR THAT YOU ARE A FAILURE OR HAVE LET YOURSELF OR YOUR FAMILY DOWN: NOT AT ALL
1. LITTLE INTEREST OR PLEASURE IN DOING THINGS: NEARLY EVERY DAY
SUM OF ALL RESPONSES TO PHQ QUESTIONS 1-9: 16
SUM OF ALL RESPONSES TO PHQ QUESTIONS 1-9: 16
2. FEELING DOWN, DEPRESSED OR HOPELESS: MORE THAN HALF THE DAYS
SUM OF ALL RESPONSES TO PHQ QUESTIONS 1-9: 16
8. MOVING OR SPEAKING SO SLOWLY THAT OTHER PEOPLE COULD HAVE NOTICED. OR THE OPPOSITE, BEING SO FIGETY OR RESTLESS THAT YOU HAVE BEEN MOVING AROUND A LOT MORE THAN USUAL: NOT AT ALL
4. FEELING TIRED OR HAVING LITTLE ENERGY: NEARLY EVERY DAY
7. TROUBLE CONCENTRATING ON THINGS, SUCH AS READING THE NEWSPAPER OR WATCHING TELEVISION: NEARLY EVERY DAY
10. IF YOU CHECKED OFF ANY PROBLEMS, HOW DIFFICULT HAVE THESE PROBLEMS MADE IT FOR YOU TO DO YOUR WORK, TAKE CARE OF THINGS AT HOME, OR GET ALONG WITH OTHER PEOPLE: VERY DIFFICULT
SUM OF ALL RESPONSES TO PHQ9 QUESTIONS 1 & 2: 5
9. THOUGHTS THAT YOU WOULD BE BETTER OFF DEAD, OR OF HURTING YOURSELF: NOT AT ALL

## 2024-04-29 ASSESSMENT — LIFESTYLE VARIABLES
HOW MANY STANDARD DRINKS CONTAINING ALCOHOL DO YOU HAVE ON A TYPICAL DAY: 0
HOW OFTEN DO YOU HAVE A DRINK CONTAINING ALCOHOL: 1
HOW OFTEN DO YOU HAVE A DRINK CONTAINING ALCOHOL: NEVER
HOW OFTEN DO YOU HAVE SIX OR MORE DRINKS ON ONE OCCASION: 1
HOW MANY STANDARD DRINKS CONTAINING ALCOHOL DO YOU HAVE ON A TYPICAL DAY: PATIENT DOES NOT DRINK

## 2024-04-29 NOTE — PROGRESS NOTES
MORNING Yes Joann Simmons MD   amLODIPine (NORVASC) 10 MG tablet TAKE 1 TABLET BY MOUTH DAILY Yes Joann Simmons MD   pantoprazole (PROTONIX) 20 MG tablet TAKE 1 TABLET BY MOUTH DAILY Yes Joann Simmons MD   atorvastatin (LIPITOR) 20 MG tablet TAKE 1 TABLET BY MOUTH DAILY Yes Joann Simmons MD   vitamin D (D3 5000) 125 MCG (5000 UT) CAPS capsule Take 1 capsule by mouth daily Yes Carrie Olson MD   lisinopril (PRINIVIL;ZESTRIL) 40 MG tablet TAKE 1 TABLET BY MOUTH DAILY Yes Joann Simmons MD   pramipexole (MIRAPEX) 0.25 MG tablet Take 1 tablet by mouth nightly Yes Joann Simmons MD   aMILoride (MIDAMOR) 5 MG tablet TAKE ONE TABLET BY MOUTH DAILY Yes Joann Simmons MD   buPROPion (WELLBUTRIN XL) 150 MG extended release tablet Take 1 tablet by mouth daily Yes Carrie Olson MD   pregabalin (LYRICA) 150 MG capsule Take 1 capsule by mouth in the morning and at bedtime. Yes Carrie Olson MD   celecoxib (CELEBREX) 100 MG capsule Take 2 capsules by mouth daily Yes ProviderCarrie MD   hydrOXYzine HCl (ATARAX) 50 MG tablet Take 1 tablet by mouth nightly Yes Joann Simmons MD   diclofenac sodium (VOLTAREN) 1 % GEL  Yes Carrie Olson MD   chlorhexidine (PERIDEX) 0.12 % solution  Yes Carrie Olson MD   acetaminophen (TYLENOL) 500 MG tablet Take 2 tablets by mouth at bedtime Yes Carrie Olson MD   OXcarbazepine (TRILEPTAL) 600 MG tablet oxcarbazepine 600 mg tablet Yes Carrie Olson MD   buPROPion (WELLBUTRIN XL) 300 MG extended release tablet bupropion HCl  mg 24 hr tablet, extended release Yes Carrie Olson MD   FLUoxetine (PROZAC) 40 MG capsule 20 mg Yes Carrie Olson MD   FLUoxetine (PROZAC) 10 MG capsule Take 1 capsule by mouth daily  Patient not taking: Reported on 4/29/2024  Carrie Olson MD CareTeam (Including outside providers/suppliers regularly involved in providing care):   Patient Care

## 2024-05-16 ENCOUNTER — CARE COORDINATION (OUTPATIENT)
Dept: CARE COORDINATION | Age: 54
End: 2024-05-16

## 2024-05-16 NOTE — CARE COORDINATION
PCP referral for SDOH needs. Attempted to call patient for care management and SDOH assessment, left VM message asking for return call. Will call again in a few days.    Future Appointments   Date Time Provider Department Center   10/29/2024  9:00 AM [facility]

## 2024-05-21 ENCOUNTER — CARE COORDINATION (OUTPATIENT)
Dept: CARE COORDINATION | Age: 54
End: 2024-05-21

## 2024-05-21 SDOH — ECONOMIC STABILITY: INCOME INSECURITY: IN THE LAST 12 MONTHS, WAS THERE A TIME WHEN YOU WERE NOT ABLE TO PAY THE MORTGAGE OR RENT ON TIME?: NO

## 2024-05-21 SDOH — ECONOMIC STABILITY: FOOD INSECURITY: WITHIN THE PAST 12 MONTHS, THE FOOD YOU BOUGHT JUST DIDN'T LAST AND YOU DIDN'T HAVE MONEY TO GET MORE.: NEVER TRUE

## 2024-05-21 SDOH — ECONOMIC STABILITY: TRANSPORTATION INSECURITY
IN THE PAST 12 MONTHS, HAS THE LACK OF TRANSPORTATION KEPT YOU FROM MEDICAL APPOINTMENTS OR FROM GETTING MEDICATIONS?: NO

## 2024-05-21 SDOH — ECONOMIC STABILITY: HOUSING INSECURITY: IN THE LAST 12 MONTHS, HOW MANY PLACES HAVE YOU LIVED?: 1

## 2024-05-21 SDOH — ECONOMIC STABILITY: FOOD INSECURITY: WITHIN THE PAST 12 MONTHS, YOU WORRIED THAT YOUR FOOD WOULD RUN OUT BEFORE YOU GOT MONEY TO BUY MORE.: NEVER TRUE

## 2024-05-21 NOTE — CARE COORDINATION
Ambulatory Care Coordination Note  2024    Patient Current Location:  Home: [address] [place] [id number]    ACM contacted the patient by telephone. Verified name and  with patient as identifiers. Provided introduction to self, and explanation of the ACM role.     ACM: Malorie Godoy RN    Challenges to be reviewed by the provider   Additional needs identified to be addressed with provider: No  none               Method of communication with provider: none.    PCP referral for SDOH needs but he denied having any needs.  He has HTN, diabetes insipidus, BPH, GERD, SCOT- doesn't wear CPAP, bipolar disorder, chronic back pain with history back surgery, history kidney cancer. Follows with urology, psychiatry, pain mgmt, arthritis specialist, ortho for left biceps tendinitis. He has wondered if should also be seeing cardiology due to all the BP meds he takes as psychiatrist has suggested to him.  Reviewed all medications. He feels shouldn't be taking all the medications he is on, would like to have a provider review and maybe cut out some meds.  He cannot bend over without feeling he will pass out. Doesn't check blood pressures at home.  Mother has been in and out of hospitals and rehab facilities for several months and has been stressful dealing with that.  He is agreeable to RPM enrollment for BP monitoring but will be leaving soon for a vacation so will wait to start when he returns on .   CC Plan:   -Follow up after his vacation to again discuss and order RPM, review ACP, discuss appts.  Hypertension - Encounter Level    Symptoms:  (Comment: Dizziness, feels could pass out when bends over)  Symptom course: no change      and   General Assessment    Do you have any symptoms that are causing concern?: Yes  Progression since Onset: Intermittent - Waxing/Waning  Reported Symptoms: Other (Comment: stress related to mother's health conditions)              Offered patient enrollment in the Remote Patient

## 2024-06-10 ENCOUNTER — CARE COORDINATION (OUTPATIENT)
Dept: CARE COORDINATION | Age: 54
End: 2024-06-10

## 2024-06-10 ENCOUNTER — CARE COORDINATION (OUTPATIENT)
Dept: PRIMARY CARE CLINIC | Age: 54
End: 2024-06-10

## 2024-06-10 DIAGNOSIS — I10 HYPERTENSION, UNSPECIFIED TYPE: Primary | ICD-10-CM

## 2024-06-10 NOTE — CARE COORDINATION
Ambulatory Care Coordination Note  6/10/2024    Patient Current Location:  Home: [address] [place] [id number]     ACM contacted the patient by telephone. Verified name and  with patient as identifiers. Provided introduction to self, and explanation of the ACM role.     Challenges to be reviewed by the provider   Additional needs identified to be addressed with provider: No  none               Method of communication with provider: none.    ACM: Malorie Godoy RN    He is back from vacation, stated still wants to do RPM for BP monitoring.  No problems with lightheadedness while on vacation. He had back spasms, pain mgmt provider thought probably due to snorkeling. Otherwise he feels good.  CC Plan:   -RPM ordered. Follow up next week to check vitals, continue education.  Hypertension - Encounter Level    Symptom course: no change      and   General Assessment    Do you have any symptoms that are causing concern?: Yes  Reported Symptoms: Other (Comment: back spasms)            Offered patient enrollment in the Remote Patient Monitoring (RPM) program for in-home monitoring: Yes, patient enrolled today:     Remote Patient Monitoring Enrollment Note    Date/Time:  6/10/2024 3:06 PM    Offered patient enrollment in the [facility] Remote Patient Monitoring (RPM) program for in home monitoring for HTN; condition managed by PCP.  Patient accepted.    Patient will be monitoring the following daily:  Blood Pressure, Pulse ox, and Weight    ACM reviewed the information below with the patient:    Emergency Contact (name and contact number): Lexie at [phone]      [x]  A member from the care coordination team will reach out to notify the patient once the RPM kit is ordered.  [x]  Once the kit is delivered, the HRS team will contact the patient after UPS delivers to assist with set up.  [x]  Determined BP cuff size: regular (9.05\"-15.74\")  [x]  Determined weight scale: regular (<330lbs)  [x]  Hours of ACM

## 2024-06-10 NOTE — CARE COORDINATION
Remote Patient Kit Ordering Note      Date/Time:  6/10/2024 3:29 PM      CCSS placed phone call to patient/family today to notify of RPM kit order; patient/family was available; discussed the following topics below and all questions answered.    [x] CCSS confirmed patient shipping address  [x] Patient will receive package over the next 1-3 business days. Someone 21 years or older must be present to sign for UPS delivery.  [x] HRS will contact patient within 24 hours, an HRS  will call the patient directly: If the patient does not answer, HRS will follow up with the clinical team notifying them about the unsuccessful attempt to contact the patient. HRS will make three call attempts to the patient.Provide patient with [facility] Virtual install number is: [phone].  [x] ACM will contact patient once equipment is active to welcome them to the program.                                                         [x] Hours of RPM monitoring - Monday-Friday [id number]; encourage patient to get vitals entered by Noon each day to have the alert addressed same day.  [x][facility] R[facility] Patient flyer to patient.                      ACM made aware the RPM kit has been ordered.

## 2024-06-10 NOTE — CARE COORDINATION
Left VM message asking for return call for care management follow up, address ordering of RPM kit if he is still interested. Will call again in a few days.    Future Appointments   Date Time Provider Department Center   10/29/2024  9:00 AM [facility]

## 2024-06-10 NOTE — PROGRESS NOTES
Remote Patient Monitoring Treatment Plan    Received request from ACM/Malorie Murphy, AMANDA   to order remote patient monitoring for in home monitoring of HTN; Condition managed by PCP.  and order completed.     Patient will be monitoring blood pressure   pulse ox   weight. Please set alert for ONLY weight gain of 5# in 7 days. Pt has no documented hx of HF.       Patient will engage in Remote Patient Monitoring each day to develop the skills necessary for self management.       RPM Care Team Responsibilities:   Alerts will be reviewed daily and addressed within 2-4 hours during operational hours (Monday -Friday 9 am-4 pm)  Alert response and intervention documented in patient medical record  Alert response escalated to PCP per protocol and documented in patient medical record  Patient monitored over approximately  days  Discharge from program based on self-management readiness    See care coordination encounters for additional details.

## 2024-06-17 ENCOUNTER — CARE COORDINATION (OUTPATIENT)
Dept: CARE COORDINATION | Age: 54
End: 2024-06-17

## 2024-06-17 NOTE — CARE COORDINATION
He got the RPM kit but hasn't been home, hasn't opened the box yet. Encouraged him to read the letter inside the box, call the [facility] set up number, he will do that this week.    Will follow up again next week.    Future Appointments   Date Time Provider Department Center   10/29/2024  9:00 AM Joann Simmons MD [facility]

## 2024-06-19 ENCOUNTER — CARE COORDINATION (OUTPATIENT)
Dept: CARE COORDINATION | Age: 54
End: 2024-06-19

## 2024-06-19 NOTE — CARE COORDINATION
[] Instructed patient to keep scale on flat surface                                                         [] Instructed patient to keep tablet plugged in at all times                         [x] Instructed how to contact IT support ([phone])  [] Provided Remote Patient Monitoring care  information                All questions answered at this time.          Offered patient enrollment in the Remote Patient Monitoring (RPM) program for in-home monitoring: Yes, patient enrolled; current status is activated and monitoring.     Assessments Completed:   No changes since last call    Medications Reviewed:   Not completed during this call:      Advance Care Planning:   Not reviewed during this call     Care Planning:   Not completed during this call    PCP/Specialist follow up:   Future Appointments         Provider Specialty Dept Phone    10/29/2024 9:00 AM Joann Simmons MD Family [facility] [phone]            Follow Up:   Plan for next ACM outreach in approximately 1 week to complete:  - disease specific assessments  - goal progression  - RPM.   patient  is agreeable to this plan.

## 2024-06-21 ENCOUNTER — CARE COORDINATION (OUTPATIENT)
Dept: CASE MANAGEMENT | Age: 54
End: 2024-06-21

## 2024-06-21 NOTE — CARE COORDINATION
Remote Patient Monitoring Note  Date/Time:  2024 2:39 PM  Patient Current Location: Home: [address] [place] [address]  LPN contacted patient by telephone regarding red alert received for blood pressure reading (127/104) and blood pressure heart rate (121). Verified patients name and  as identifiers.  Background: Enrolled in RPM for HTN  Clinical Interventions: Reviewed and followed up on alerts and treatments-spoke to Tejas regarding RPM alert for high BP AND HR. Denies chest pain dyspnea numbness tingling or headache. Requested pt recheck his BP metrics. He is not home at this time. Reviewed instructions for checking BP    Educated on keeping feet flat on the floor. Place cuff  between shoulder and elbow with tubing going towards the wrist. White dot is aligned with inner most part of elbow . BP cuff needs to fit snug. not loose. Able to get 2 fingers under the cuff. Extend arm on table, dresser or counter. Do not bend arm.  Educated on not checking pressure over a long sleeve shirt or sweater. Sit quietly while checking. verbalized understanding. Pt states his arm was on his lap  Plan/Follow Up: Will continue to review, monitor and address alerts with follow up based on severity of symptoms and risk factors.

## 2024-06-21 NOTE — CARE COORDINATION
Date/Time:  6/21/2024 1:36 PM  LPN attempted to reach patient by telephone regarding red alert in remote patient monitoring program. Left HIPPA compliant message requesting a return call. Will attempt to reach patient again.    RPM RED ALERT for BP  AND HR  Enrolled in RPM for HTN

## 2024-06-27 ENCOUNTER — CARE COORDINATION (OUTPATIENT)
Dept: CASE MANAGEMENT | Age: 54
End: 2024-06-27

## 2024-06-27 NOTE — CARE COORDINATION
-Remote Alert Monitoring Note      Date/Time:  2024 10:39 AM  Patient Current Location: Home: [address] [place] [address]  Verified patients name and  as identifiers.    Rpm alert to be reviewed by the provider   red alert  blood pressure heart rate (124) and pulse ox heart rate (128)    Additional needs to be addressed by provider: No                   LPN contacted patient by telephone regarding red alert received   Background: HTN  Refer to 911 immediately if:  Patient unresponsive or unable to provide history  Change in cognition or sudden confusion  Patient unable to respond in complete sentences  Intense chest pain/tightness  Any concern for any clinical emergency  Red Alert: Provider response time of 1 hr required for any red alert requiring intervention  Yellow Alert: Provider response time of 3hr required for any escalated yellow alert  Patient Chief Complaint:  HR Triage  Are you having any Chest Pain? no   Are you having any Shortness of Breath? no   Are you having any dizziness? no   Are you feeling more fatigued or tired than normal? no   Are you having any other health concerns or issues? no     Clinical Interventions: Reviewed and followed up on alerts and treatments-Spoke to patient he states he is doing well this am, he denies chest pain, SOB, dizziness states he was up and doing things prior to check ing his vitals this am, he declined rechecking vitals at present time as he is eating but will recheck as soon as he is done with breakfast.     Plan/Follow Up: Will continue to review, monitor and address alerts with follow up based on severity of symptoms and risk factors.  **For any new or worsening symptoms, please contact your Provider or report to the nearest Emergency Room.**

## 2024-06-28 VITALS
BODY MASS INDEX: 32.28 KG/M2 | WEIGHT: 225 LBS | SYSTOLIC BLOOD PRESSURE: 125 MMHG | HEART RATE: 96 BPM | OXYGEN SATURATION: 97 % | DIASTOLIC BLOOD PRESSURE: 89 MMHG

## 2024-07-01 ENCOUNTER — CARE COORDINATION (OUTPATIENT)
Dept: CARE COORDINATION | Age: 54
End: 2024-07-01

## 2024-07-01 NOTE — CARE COORDINATION
Ambulatory Care Coordination Note     2024 2:12 PM     Patient Current Location:  Home: [address] [place] [id number]     ACM contacted the patient by telephone. Verified name and  with patient as identifiers.         ACM: Malorie Godoy RN     Challenges to be reviewed by the provider   Additional needs identified to be addressed with provider No  none               Method of communication with provider: none.    Care Summary Note: has left arm pain, has been following with ortho, diagnosed as biceps tendinitis but nothing is helping the pain except when taped. OT will teach him how to tape it himself. This is worst pain but also has nagging pains other parts of his body. Not doing strenuous activities like lifting or pulling.  Weight fluctuates on RPM, he doesn't know why, no leg swelling.  His mother is living with him now, she is getting home health visits.  Continues to do daily vitals with RPM.    Offered patient enrollment in the Remote Patient Monitoring (RPM) program for in-home monitoring: Yes, patient enrolled; current status is activated and monitoring.     Assessments Completed:   General Assessment    Do you have any symptoms that are causing concern?: Yes  Reported Symptoms: Pain (Comment: tendinitis pain left arm)          Medications Reviewed:   Completed during a previous call     Advance Care Planning:   Not reviewed during this call     Care Planning:    Goals Addressed                   This Visit's Progress     Self Monitoring   On track     Daily Weights - I will weight myself as directed - Daily and write down weights  I will notify my provider of any increase in weight by 3 or more pounds in 2 days OR 5 or more pounds in a week.  Blood Pressure - I will take my blood pressure as directed - Daily  I will notify my provider of any trends of increasing or decreasing blood pressures over a month period of time.  I will notify my provider of any changes in blood pressure associated with

## 2024-07-04 DIAGNOSIS — F51.01 PRIMARY INSOMNIA: ICD-10-CM

## 2024-07-05 RX ORDER — HYDROXYZINE 50 MG/1
50 TABLET, FILM COATED ORAL NIGHTLY
Qty: 90 TABLET | Refills: 1 | Status: SHIPPED | OUTPATIENT
Start: 2024-07-05

## 2024-07-05 NOTE — TELEPHONE ENCOUNTER
Last visit: 4/29/24  Last Med refill: 8/8/23  Does patient have enough medication for 72 hours: No:     Next Visit Date:  Future Appointments   Date Time Provider Department Center   10/29/2024  9:00 AM Joann Simmons MD [facility]       Health Maintenance   Topic Date Due    Hepatitis B vaccine (1 of 3 - 3-dose series) Never done    COVID-19 Vaccine (3 - 2023-24 season) 09/01/2023    Flu vaccine (1) 08/01/2024    Lipids  02/08/2025    Depression Monitoring  04/29/2025    Colorectal Cancer Screen  06/11/2025    Diabetes screen  02/08/2027    DTaP/Tdap/Td vaccine (2 - Td or Tdap) 10/01/2030    Annual Wellness Visit (Medicare Advantage)  Completed    Shingles vaccine  Completed    Hepatitis C screen  Completed    HIV screen  Completed    Hepatitis A vaccine  Aged Out    Hib vaccine  Aged Out    Polio vaccine  Aged Out    Meningococcal (ACWY) vaccine  Aged Out    Pneumococcal 0-64 years Vaccine  Aged Out    Depression Screen  Discontinued       Hemoglobin A1C (%)   Date Value   02/08/2024 5.3   01/21/2022 5.0   05/11/2017 4.7             ( goal A1C is < 7)   No components found for: \"LABMICR\"  No components found for: \"LDLCHOLESTEROL\", \"LDLCALC\"    (goal LDL is <100)   AST (U/L)   Date Value   02/08/2024 20     ALT (U/L)   Date Value   02/08/2024 22     BUN (mg/dL)   Date Value   02/08/2024 13     BP Readings from Last 3 Encounters:   07/03/24 125/81   04/29/24 120/76   02/08/24 134/86          (goal 120/80)    All Future Testing planned in [facility]  Lab Frequency Next Occurrence               Patient Active Problem List:     Bipolar disorder (HCC)     HTN (hypertension)     Change in bowel habits     Diverticulosis     Internal hemorrhoid     Mixed hyperlipidemia     Bipolar disorder (HCC)     Diabetes insipidus (HCC)     Spinal stenosis of lumbar region     Primary insomnia     History of renal cell carcinoma     Scalp cyst     Restless leg     Gastroesophageal reflux disease without esophagitis

## 2024-07-19 ENCOUNTER — CARE COORDINATION (OUTPATIENT)
Dept: CARE COORDINATION | Age: 54
End: 2024-07-19

## 2024-07-19 NOTE — CARE COORDINATION
Date/Time:  7/19/2024 1:35 PM  LPN attempted to reach patient by telephone regarding  no metrics for more than 3 days  in remote patient monitoring program. Left HIPPA compliant message requesting a return call.   Sent my chart message to patient.     Colten Browne ,    I am a nurse with the remote patient monitoring team. I am reaching out to you today to remind you to please take and document your vitals.     Important: Please try to take your vitals each day before noon, 12pm. This ensures the monitoring team will have time to connect with your providers and get back to you with any new orders or instructions.     If you have trouble with you equipment. Please call support at [phone].    Thank you!     [facility]'s Remote Patient Monitoring Nurse  Cell Phone # [phone]

## 2024-07-22 ENCOUNTER — CARE COORDINATION (OUTPATIENT)
Dept: CARE COORDINATION | Age: 54
End: 2024-07-22

## 2024-07-22 NOTE — CARE COORDINATION
Ambulatory Care Coordination Note     2024 4:30 PM     Patient Current Location:  Home: [address] [place] [id number]     ACM contacted the patient by telephone. Verified name and  with patient as identifiers.         ACM: Malorie Godoy RN     Challenges to be reviewed by the provider   Additional needs identified to be addressed with provider No  none               Method of communication with provider: none.    Care Summary Note: He hasn't been entering RPM vitals every day, stated he keeps forgetting. Encouraged to make it a habit to get on scale as soon as he gets up then take medications then check BP an hour or two later.  Has shooting pains all over his body that come and go, he will discuss with pain mgmt about nerve stimulator.  No chest pain, headaches, dizziness. BP numbers 136-146 systolic the past week. He has some swelling in his hands and sometimes his feet.  He is taking medications regularly, not missing.    Offered patient enrollment in the Remote Patient Monitoring (RPM) program for in-home monitoring: Yes, patient enrolled; current status is activated and monitoring.     Assessments Completed:   Hypertension - Encounter Level    Symptom course: stable       and   General Assessment    Do you have any symptoms that are causing concern?: Yes  Reported Symptoms: Pain (Comment: generalized shooting body pains)          Medications Reviewed:   Completed during a previous call     Advance Care Planning:   Not reviewed during this call     Care Planning:   Education Documentation  Medication Assistance - What You Need to Know, taught by Malorie Godoy, RN at 2024  4:29 PM.  Learner: Patient  Readiness: Acceptance  Method: Explanation, Teachback  Response: Verbalizes Understanding    Types of Medications You May Be On, taught by Malorie Godoy, RN at 2024  4:29 PM.  Learner: Patient  Readiness: Acceptance  Method: Explanation, Teachback  Response: Verbalizes

## 2024-07-23 ENCOUNTER — CARE COORDINATION (OUTPATIENT)
Dept: CASE MANAGEMENT | Age: 54
End: 2024-07-23

## 2024-07-23 NOTE — CARE COORDINATION
-Remote Alert Monitoring Note      Date/Time:  2024 12:11 PM  Patient Current Location: Home: [address] [place] [address]  Verified patients name and  as identifiers.    Rpm alert to be reviewed by the provider   red alert  weight (INCREASE OF  5 POUNDS IN 7 DAYS)  Vitals Recheck   Additional needs to be addressed by provider: No                   LPN contacted patient by telephone regarding red alert received   Background: ENROLLED IN RPM FOR HTN  Refer to [facility] immediately if:  Patient unresponsive or unable to provide history  Change in cognition or sudden confusion  Patient unable to respond in complete sentences  Intense chest pain/tightness  Any concern for any clinical emergency  Red Alert: Provider response time of 1 hr required for any red alert requiring intervention  Yellow Alert: Provider response time of 3hr required for any escalated yellow alert  Patient Chief Complaint:  Weight Scale Triage  Was your weight obtained upon rising/waking today? NO   Was your weight obtained after voiding and/or use of the bathroom today? yes   Did you weigh yourself in the same amount of clothing today, compared to how you typically do? no   Was the scale bumped or moved prior to today's weight? Yes  MOVES DAILY   Is your scale on a flat/hard surface? yes   Did you obtain your weight with shoes on? no   If yes, is this something you normally do during your daily weights? yes   Were you standing up straight on the scale today? yes   Were you leaning on anything while obtaining your weight today? no     Clinical Interventions: Reviewed and followed up on alerts and treatments-Spoke to Tejas regarding RPM red alert for weight increase.  Pt denies chest pain, dyspnea or increased edema.  Weights fluctuate from 218# to 231#  Tejas  states he moves is scale daily. Educated on placing scale on a hard flat surface and leaving in one spot. Verbalized understanding.   No escalation at this time.   Plan/Follow Up: Will

## 2024-07-23 NOTE — CARE COORDINATION
Date/Time:  7/23/2024 9:32 AM  LPN attempted to reach patient by telephone regarding red alert in remote patient monitoring program. Left HIPPA compliant message requesting a return call. Will attempt to reach patient again.    RPM red alert for weight increase of 5 pounds in 7 days  Enrolled in RPM for HTN

## 2024-07-24 DIAGNOSIS — G25.81 RESTLESS LEG: ICD-10-CM

## 2024-07-24 RX ORDER — PRAMIPEXOLE DIHYDROCHLORIDE 0.25 MG/1
0.25 TABLET ORAL NIGHTLY
Qty: 90 TABLET | Refills: 1 | Status: SHIPPED | OUTPATIENT
Start: 2024-07-24

## 2024-07-24 NOTE — TELEPHONE ENCOUNTER
Last visit: 04/29/24  Last Med refill: 04/25/24  Does patient have enough medication for 72 hours: Yes    Next Visit Date:  Future Appointments   Date Time Provider Department Center   10/29/2024  9:00 AM Joann Simmons MD [facility]       Health Maintenance   Topic Date Due    Hepatitis B vaccine (1 of 3 - 3-dose series) Never done    COVID-19 Vaccine (3 - 2023-24 season) 09/01/2023    Flu vaccine (1) 08/01/2024    Lipids  02/08/2025    Depression Monitoring  04/29/2025    Colorectal Cancer Screen  06/11/2025    Diabetes screen  02/08/2027    DTaP/Tdap/Td vaccine (2 - Td or Tdap) 10/01/2030    Annual Wellness Visit (Medicare Advantage)  Completed    Shingles vaccine  Completed    Hepatitis C screen  Completed    HIV screen  Completed    Hepatitis A vaccine  Aged Out    Hib vaccine  Aged Out    Polio vaccine  Aged Out    Meningococcal (ACWY) vaccine  Aged Out    Pneumococcal 0-64 years Vaccine  Aged Out    Depression Screen  Discontinued       Hemoglobin A1C (%)   Date Value   02/08/2024 5.3   01/21/2022 5.0   05/11/2017 4.7             ( goal A1C is < 7)   No components found for: \"LABMICR\"  No components found for: \"LDLCHOLESTEROL\", \"LDLCALC\"    (goal LDL is <100)   AST (U/L)   Date Value   02/08/2024 20     ALT (U/L)   Date Value   02/08/2024 22     BUN (mg/dL)   Date Value   02/08/2024 13     BP Readings from Last 3 Encounters:   07/23/24 (!) 138/91   04/29/24 120/76   02/08/24 134/86          (goal 120/80)    All Future Testing planned in [facility]  Lab Frequency Next Occurrence               Patient Active Problem List:     Bipolar disorder (HCC)     HTN (hypertension)     Change in bowel habits     Diverticulosis     Internal hemorrhoid     Mixed hyperlipidemia     Bipolar disorder (HCC)     Diabetes insipidus (HCC)     Spinal stenosis of lumbar region     Primary insomnia     History of renal cell carcinoma     Scalp cyst     Restless leg     Gastroesophageal reflux disease without esophagitis

## 2024-07-30 ENCOUNTER — CARE COORDINATION (OUTPATIENT)
Dept: CASE MANAGEMENT | Age: 54
End: 2024-07-30

## 2024-07-30 NOTE — CARE COORDINATION
Date/Time:  7/30/2024 2:15 PM  LPN attempted to reach patient by telephone regarding yellow alert no metrics for 5 days in remote patient monitoring program. Left HIPPA compliant message requesting a return call. Will attempt to reach patient again.

## 2024-07-31 ENCOUNTER — CARE COORDINATION (OUTPATIENT)
Dept: CASE MANAGEMENT | Age: 54
End: 2024-07-31

## 2024-07-31 NOTE — CARE COORDINATION
Date/Time:  7/31/2024 10:17 AM  LPN attempted to reach patient by telephone regarding red alert /102 in remote patient monitoring program. Left HIPPA compliant message requesting a return call. Will attempt to reach patient again.

## 2024-07-31 NOTE — CARE COORDINATION
LPN attempted to reach patient by telephone regarding red alert /102 in remote patient monitoring program. Left HIPPA compliant message requesting a return call. Will attempt to reach patient again.

## 2024-08-01 DIAGNOSIS — I10 PRIMARY HYPERTENSION: ICD-10-CM

## 2024-08-01 RX ORDER — LISINOPRIL 40 MG/1
40 TABLET ORAL DAILY
Qty: 90 TABLET | Refills: 1 | Status: SHIPPED | OUTPATIENT
Start: 2024-08-01

## 2024-08-01 NOTE — TELEPHONE ENCOUNTER
Last visit: 04/29/24  Last Med refill: 05/13/24  Does patient have enough medication for 72 hours: Yes    Next Visit Date:  Future Appointments   Date Time Provider Department Center   10/29/2024  9:00 AM Joann Simmons MD [facility]       Health Maintenance   Topic Date Due    Hepatitis B vaccine (1 of 3 - 3-dose series) Never done    COVID-19 Vaccine (3 - 2023-24 season) 09/01/2023    Flu vaccine (1) 08/01/2024    Lipids  02/08/2025    Depression Monitoring  04/29/2025    Colorectal Cancer Screen  06/11/2025    Diabetes screen  02/08/2027    DTaP/Tdap/Td vaccine (2 - Td or Tdap) 10/01/2030    Annual Wellness Visit (Medicare Advantage)  Completed    Shingles vaccine  Completed    Hepatitis C screen  Completed    HIV screen  Completed    Hepatitis A vaccine  Aged Out    Hib vaccine  Aged Out    Polio vaccine  Aged Out    Meningococcal (ACWY) vaccine  Aged Out    Pneumococcal 0-64 years Vaccine  Aged Out    Depression Screen  Discontinued       Hemoglobin A1C (%)   Date Value   02/08/2024 5.3   01/21/2022 5.0   05/11/2017 4.7             ( goal A1C is < 7)   No components found for: \"LABMICR\"  No components found for: \"LDLCHOLESTEROL\", \"LDLCALC\"    (goal LDL is <100)   AST (U/L)   Date Value   02/08/2024 20     ALT (U/L)   Date Value   02/08/2024 22     BUN (mg/dL)   Date Value   02/08/2024 13     BP Readings from Last 3 Encounters:   07/31/24 (!) 144/102   04/29/24 120/76   02/08/24 134/86          (goal 120/80)    All Future Testing planned in [facility]  Lab Frequency Next Occurrence               Patient Active Problem List:     Bipolar disorder (HCC)     HTN (hypertension)     Change in bowel habits     Diverticulosis     Internal hemorrhoid     Mixed hyperlipidemia     Bipolar disorder (HCC)     Diabetes insipidus (HCC)     Spinal stenosis of lumbar region     Primary insomnia     History of renal cell carcinoma     Scalp cyst     Restless leg     Gastroesophageal reflux disease without

## 2024-08-09 ENCOUNTER — TELEPHONE (OUTPATIENT)
Dept: PRIMARY CARE CLINIC | Age: 54
End: 2024-08-09

## 2024-08-09 ENCOUNTER — CARE COORDINATION (OUTPATIENT)
Dept: CARE COORDINATION | Age: 54
End: 2024-08-09

## 2024-08-09 NOTE — CARE COORDINATION
Remote Alert Monitoring Note      Date/Time:  2024 1:52 PM  Patient Current Location: Home: [address] [place] [address]    LPN contacted patient by telephone regarding red alert received for weight increase (233lb). Verified patients name and  as identifiers.    Rpm alert to be reviewed by the provider   Red Alert Weight gain  3.5lb weight gain overnight.  Patient reports slight swelling in BLE and sensations of SOB intermittently. SP02 remains 98%. He is currently taking HCTZ 12.5mg qd.    See metric history below                     Background: High Blood-Pressure     Refer to 911 immediately if:  Patient unresponsive or unable to provide history  Change in cognition or sudden confusion  Patient unable to respond in complete sentences  Intense chest pain/tightness  Any concern for any clinical emergency  Red Alert: Provider response time of 1 hr required for any red alert requiring intervention  Yellow Alert: Provider response time of 3hr required for any escalated yellow alert          Have you taken your medications as instructed by your doctor today? Yes   Weight Triage  Are you weighing any different than you did yesterday? (time of day, clothes and shoes on or off, etc)? no   Do you have any shortness of breath? yes   Do you have any swelling in your hands of feet? yes   Are you having any other health concerns or issues? no       Clinical Interventions: Reviewed and followed up on alerts and treatments-. 3.5lb weight gain overnight.  Patient reports slight swelling in BLE and sensations of SOB intermittently. SP02 remains 98%. He is currently taking HCTZ 12.5mg qd. Escalation sent to PCP and ACM.    Plan/Follow Up: Will continue to review, monitor and address alerts with follow up based on severity of symptoms and risk factors.    DUSTY Mac [facility]/ CTN/ Remote Patient monitoring  [phone]

## 2024-08-09 NOTE — TELEPHONE ENCOUNTER
pain/pressure/tightness, and NEUROLOGICAL: Denies dizziness, near-syncope, and syncope     CURRENT MEDICATIONS    Current Outpatient Rx   Medication Sig Dispense Refill    lisinopril (PRINIVIL;ZESTRIL) 40 MG tablet TAKE 1 TABLET BY MOUTH DAILY 90 tablet 1    pramipexole (MIRAPEX) 0.25 MG tablet TAKE ONE TABLET BY MOUTH ONCE NIGHTLY 90 tablet 1    hydrOXYzine HCl (ATARAX) 50 MG tablet TAKE ONE TABLET BY MOUTH ONCE NIGHTLY 90 tablet 1    tamsulosin (FLOMAX) 0.4 MG capsule Take 1 capsule by mouth daily 30 capsule 5    hydroCHLOROthiazide 12.5 MG tablet TAKE ONE TABLET BY MOUTH EVERY MORNING 90 tablet 1    amLODIPine (NORVASC) 10 MG tablet TAKE 1 TABLET BY MOUTH DAILY 90 tablet 1    pantoprazole (PROTONIX) 20 MG tablet TAKE 1 TABLET BY MOUTH DAILY 90 tablet 1    atorvastatin (LIPITOR) 20 MG tablet TAKE 1 TABLET BY MOUTH DAILY 90 tablet 1    vitamin D (D3 5000) 125 MCG (5000 UT) CAPS capsule Take 1 capsule by mouth daily      aMILoride (MIDAMOR) 5 MG tablet TAKE ONE TABLET BY MOUTH DAILY 90 tablet 3    buPROPion (WELLBUTRIN XL) 150 MG extended release tablet Take 1 tablet by mouth daily      pregabalin (LYRICA) 150 MG capsule Take 1 capsule by mouth in the morning and at bedtime.      celecoxib (CELEBREX) 100 MG capsule Take 2 capsules by mouth daily      diclofenac sodium (VOLTAREN) 1 % GEL       chlorhexidine (PERIDEX) 0.12 % solution       acetaminophen (TYLENOL) 500 MG tablet Take 2 tablets by mouth at bedtime      OXcarbazepine (TRILEPTAL) 600 MG tablet oxcarbazepine 600 mg tablet      buPROPion (WELLBUTRIN XL) 300 MG extended release tablet bupropion HCl  mg 24 hr tablet, extended release      FLUoxetine (PROZAC) 40 MG capsule 20 mg         ALLERGIES    Allergies   Allergen Reactions    No Known Allergies        RECENT LABS    Ronda Tovar, VERÓNICA, FNP-C, Remote Patient Monitoring NP, (Ph) [phone]

## 2024-08-09 NOTE — CARE COORDINATION
Remote Patient Monitoring Note      Date/Time:  8/9/2024 9:29 AM    LPN attempted to contact patient by telephone regarding red alert received for weight increase (233lb).    Background:  High Blood-Pressure     Clinical Interventions:   HIPAA compliant message left on  requesting a return call.    Plan/Follow Up: Will continue to review, monitor and address alerts with follow up based on severity of symptoms and risk factors.       Jazmin Tran LPN  [facility]/ CTN/ Remote Patient Monitoring  [phone]

## 2024-08-12 ENCOUNTER — CARE COORDINATION (OUTPATIENT)
Dept: CASE MANAGEMENT | Age: 54
End: 2024-08-12

## 2024-08-12 NOTE — CARE COORDINATION
Remote Patient Monitoring Note  Date/Time:  2024 3:09 PM  Patient Current Location: Home: [address] [place] [address]  LPN contacted patient by telephone regarding red alert received for blood pressure reading (151/106). Verified patients name and  as identifiers.  Background: enrolled in [facility] for HTN  Clinical Interventions: Reviewed and followed up on alerts and treatments-noted pt rechecked his BP . New reading is 104/78. All metrics WNL    Plan/Follow Up: Will continue to review, monitor and address alerts with follow up based on severity of symptoms and risk factors.

## 2024-08-12 NOTE — CARE COORDINATION
Date/Time:  8/12/2024 11:33 AM  LPN attempted to reach patient by telephone regarding red alert in remote patient monitoring program. Left HIPPA compliant message requesting a return call. Will attempt to reach patient again.    MISSED return call from pt.   Call back to pt. No answer. Left message again

## 2024-08-12 NOTE — CARE COORDINATION
Date/Time:  8/12/2024 10:12 AM  LPN attempted to reach patient by telephone regarding red alert in remote patient monitoring program. Left HIPPA compliant message requesting a return call. Will attempt to reach patient again.    [facility] RED ALERT for high BP reading 151/106  Enrolled in Rpm for HTN

## 2024-08-12 NOTE — CARE COORDINATION
-Remote Alert Monitoring Note      Date/Time:  2024 11:40 AM  Patient Current Location: [place]  Verified patients name and  as identifiers.    Rpm alert to be reviewed by the provider   red alert  blood pressure reading (151/106)  Vitals Recheck N/A  Additional needs to be addressed by provider: No                   LPN contacted patient by telephone regarding red alert received   Background: ENROLLED IN RPM for HTN  Refer to 911 immediately if:  Patient unresponsive or unable to provide history  Change in cognition or sudden confusion  Patient unable to respond in complete sentences  Intense chest pain/tightness  Any concern for any clinical emergency  Red Alert: Provider response time of 1 hr required for any red alert requiring intervention  Yellow Alert: Provider response time of 3hr required for any escalated yellow alert  Patient Chief Complaint:  BP Triage  Are you having any Chest Pain? no   Are you having any Shortness of Breath? no   Do you have a headache or have any vision changes? no   Are you having any numbness or tingling? no   Are you having any other health concerns or issues? no     Clinical Interventions: Reviewed and followed up on alerts and treatments-spoke to [place] regarding Rpm RED alert for high Bp. Denies chest pain or dyspnea. No numbness tingling or headache. Reviewed medications.   Pt is taking Lisinopril and Norvasc. He took his medications just prior to checking metrics. Educated on checking metrics 1-2 hours after taking medications. Verbalized understanding. Pt unable to recheck BP at this time but will recheck when he gets home.   Plan/Follow Up: Will continue to review, monitor and address alerts with follow up based on severity of symptoms and risk factors.  **For any new or worsening symptoms or you are concerned in anyway, please contact your Provider or report to the nearest Emergency Room.**

## 2024-08-13 ENCOUNTER — HOSPITAL ENCOUNTER (OUTPATIENT)
Age: 54
Setting detail: SPECIMEN
Discharge: HOME | End: 2024-08-13

## 2024-08-13 ENCOUNTER — CARE COORDINATION (OUTPATIENT)
Dept: CARE COORDINATION | Age: 54
End: 2024-08-13

## 2024-08-13 ENCOUNTER — OFFICE VISIT (OUTPATIENT)
Dept: FAMILY MEDICINE CLINIC | Age: 54
End: 2024-08-13
Payer: MEDICARE

## 2024-08-13 VITALS
BODY MASS INDEX: 34.15 KG/M2 | WEIGHT: 238 LBS | SYSTOLIC BLOOD PRESSURE: 116 MMHG | DIASTOLIC BLOOD PRESSURE: 74 MMHG | OXYGEN SATURATION: 96 % | HEART RATE: 101 BPM

## 2024-08-13 DIAGNOSIS — K21.9 GASTROESOPHAGEAL REFLUX DISEASE WITHOUT ESOPHAGITIS: ICD-10-CM

## 2024-08-13 DIAGNOSIS — R06.02 SHORTNESS OF BREATH: ICD-10-CM

## 2024-08-13 DIAGNOSIS — G47.33 OSA (OBSTRUCTIVE SLEEP APNEA): ICD-10-CM

## 2024-08-13 DIAGNOSIS — R06.02 SHORTNESS OF BREATH: Primary | ICD-10-CM

## 2024-08-13 LAB
ANION GAP SERPL CALCULATED.3IONS-SCNC: 10 MMOL/L (ref 9–16)
BNP SERPL-MCNC: <36 PG/ML (ref 0–300)
BUN SERPL-MCNC: 13 MG/DL (ref 6–20)
CALCIUM SERPL-MCNC: 9 MG/DL (ref 8.6–10.4)
CHLORIDE SERPL-SCNC: 111 MMOL/L (ref 98–107)
CO2 SERPL-SCNC: 24 MMOL/L (ref 20–31)
CREAT SERPL-MCNC: 1 MG/DL (ref 0.7–1.2)
GFR, ESTIMATED: >90 ML/MIN/1.73M2
GLUCOSE SERPL-MCNC: 132 MG/DL (ref 74–99)
POTASSIUM SERPL-SCNC: 4.3 MMOL/L (ref 3.7–5.3)
SODIUM SERPL-SCNC: 145 MMOL/L (ref 136–145)

## 2024-08-13 PROCEDURE — 99214 OFFICE O/P EST MOD 30 MIN: CPT | Performed by: INTERNAL MEDICINE

## 2024-08-13 PROCEDURE — 3078F DIAST BP <80 MM HG: CPT | Performed by: INTERNAL MEDICINE

## 2024-08-13 PROCEDURE — 3074F SYST BP LT 130 MM HG: CPT | Performed by: INTERNAL MEDICINE

## 2024-08-13 RX ORDER — BACLOFEN 10 MG/1
10 TABLET ORAL PRN
COMMUNITY
Start: 2024-07-26

## 2024-08-13 RX ORDER — DOXEPIN HYDROCHLORIDE 50 MG/1
50 CAPSULE ORAL NIGHTLY
COMMUNITY
Start: 2024-08-07

## 2024-08-13 RX ORDER — FUROSEMIDE 20 MG/1
20 TABLET ORAL 2 TIMES DAILY
Qty: 10 TABLET | Refills: 0 | Status: SHIPPED | OUTPATIENT
Start: 2024-08-13 | End: 2024-08-18

## 2024-08-13 NOTE — PROGRESS NOTES
systems reviewed and are negative.      Objective     /74 (Site: Left Upper Arm, Position: Sitting, Cuff Size: Large Adult)   Pulse (!) 101   Wt 108 kg (238 lb)   SpO2 96%   BMI 34.15 kg/m²   Wt Readings from Last 3 Encounters:   08/13/24 108 kg (238 lb)   08/12/24 105.7 kg (233 lb)   04/29/24 100.1 kg (220 lb 9.6 oz)       Physical Exam  Vitals and nursing note reviewed.   Constitutional:       General: He is not in acute distress.     Appearance: He is well-developed. He is not ill-appearing.   Eyes:      General: Lids are normal. Vision grossly intact.   Neck:      Comments: Neck circumference 43 cm  Cardiovascular:      Rate and Rhythm: Normal rate and regular rhythm.      Heart sounds: Normal heart sounds, S1 normal and S2 normal. No murmur heard.     No friction rub. No gallop.   Pulmonary:      Effort: Pulmonary effort is normal. No respiratory distress.      Breath sounds: Normal breath sounds. No wheezing.   Abdominal:      General: Bowel sounds are normal.      Palpations: Abdomen is soft. There is no mass.      Tenderness: There is no abdominal tenderness. There is no guarding.   Musculoskeletal:         General: Normal range of motion.   Skin:     General: Skin is warm and dry.      Capillary Refill: Capillary refill takes less than 2 seconds.   Neurological:      General: No focal deficit present.      Mental Status: He is alert and oriented to person, place, and time.           Data Review       There are no preventive care reminders to display for this patient.        Patient given educational materials- see patient instructions.  Discussed use, benefit, and side effects of prescribedmedications.  All patient questions answered.  Pt voiced understanding. Reviewedhealth maintenance.  Instructed to continue current medications, diet and exercise.Patient agreed with treatment plan. Follow up as directed.     Electronically signedby NUSRAT RABAGO MD on 8/13/2024

## 2024-08-13 NOTE — CARE COORDINATION
Ambulatory Care Coordination Note     8/13/2024 11:24 AM     Patient outreach attempt by this ACM today to perform care management follow up . ACM was unable to reach the patient by telephone today; left voice message requesting a return phone call to this ACM.     ACM: Malorie Godoy, RN     Care Summary Note: Per PCP, for weight gain per RPM vitals, can double his HCTZ dose for 3 days and avoid processed foods and reassess. Writer gave these instructions to him on voicemail asking he call writer back to confirm he understands instructions.    PCP/Specialist follow up:   Future Appointments         Provider Specialty Dept Phone    8/13/2024 3:00 PM Joann Simmons MD Family Medicine [phone]    10/29/2024 9:00 AM Joann Simmons MD Family Medicine [phone]            Follow Up:   Plan for next AC outreach in approximately 1-2 days  to complete:  - disease specific assessments  - RPM.

## 2024-08-13 NOTE — CARE COORDINATION
Patient called writer back. His weight is up 1.5 lbs from yesterday to 234.5. He has PCP appt  today and will discuss his weights and BP at appt. He is aware to limit sodium and processed foods in his diet.

## 2024-08-15 DIAGNOSIS — R06.02 SHORTNESS OF BREATH: ICD-10-CM

## 2024-08-15 RX ORDER — FUROSEMIDE 20 MG/1
TABLET ORAL
Qty: 10 TABLET | Refills: 0 | OUTPATIENT
Start: 2024-08-15

## 2024-08-16 ENCOUNTER — NURSE ONLY (OUTPATIENT)
Dept: FAMILY MEDICINE CLINIC | Age: 54
End: 2024-08-16

## 2024-08-16 ENCOUNTER — HOSPITAL ENCOUNTER (OUTPATIENT)
Age: 54
Setting detail: SPECIMEN
Discharge: HOME | End: 2024-08-16

## 2024-08-16 ENCOUNTER — CARE COORDINATION (OUTPATIENT)
Dept: CASE MANAGEMENT | Age: 54
End: 2024-08-16

## 2024-08-16 VITALS — BODY MASS INDEX: 33.12 KG/M2 | WEIGHT: 230.8 LBS

## 2024-08-16 DIAGNOSIS — R06.02 SHORTNESS OF BREATH: ICD-10-CM

## 2024-08-16 DIAGNOSIS — Z76.89 ENCOUNTER FOR WEIGHT MANAGEMENT: Primary | ICD-10-CM

## 2024-08-16 LAB
ANION GAP SERPL CALCULATED.3IONS-SCNC: 9 MMOL/L (ref 9–16)
BUN SERPL-MCNC: 16 MG/DL (ref 6–20)
CALCIUM SERPL-MCNC: 8.9 MG/DL (ref 8.6–10.4)
CHLORIDE SERPL-SCNC: 107 MMOL/L (ref 98–107)
CO2 SERPL-SCNC: 26 MMOL/L (ref 20–31)
CREAT SERPL-MCNC: 1.1 MG/DL (ref 0.7–1.2)
GFR, ESTIMATED: 78 ML/MIN/1.73M2
GLUCOSE SERPL-MCNC: 113 MG/DL (ref 74–99)
POTASSIUM SERPL-SCNC: 4.2 MMOL/L (ref 3.7–5.3)
SODIUM SERPL-SCNC: 142 MMOL/L (ref 136–145)

## 2024-08-16 NOTE — CARE COORDINATION
-Remote Alert Monitoring Note      Date/Time:  2024 1:52 PM  Patient Current Location: Home: [address] [place] [address]  Verified patients name and  as identifiers.    Rpm alert to be reviewed by the provider   red alert  blood pressure reading (83/69)  Vitals Recheck blood pressure reading (114/80)  Additional needs to be addressed by provider: No                   LPN contacted patient by telephone regarding red alert received   Background: HTN  Refer to 911 immediately if:  Patient unresponsive or unable to provide history  Change in cognition or sudden confusion  Patient unable to respond in complete sentences  Intense chest pain/tightness  Any concern for any clinical emergency  Red Alert: Provider response time of 1 hr required for any red alert requiring intervention  Yellow Alert: Provider response time of 3hr required for any escalated yellow alert  Patient Chief Complaint:  BP Triage  Are you having any Chest Pain? no   Are you having any Shortness of Breath? no   Do you have a headache or have any vision changes? no   Are you having any numbness or tingling? yes   Are you having any other health concerns or issues? no     Clinical Interventions: Reviewed and followed up on alerts and treatments-Spoke to patient he denies chest pain, SOB, he does have some dizziness and fatigue advised patient to make sure he is drinking fluids throughout the day no need to push fluid but he needs to stay hydrated, recheck of BP is now WNL  no other concerns at present time    Plan/Follow Up: Will continue to review, monitor and address alerts with follow up based on severity of symptoms and risk factors.  **For any new or worsening symptoms or you are concerned in anyway, please contact your Provider or report to the nearest Emergency Room.**

## 2024-08-16 NOTE — PROGRESS NOTES
Pt came into the office today for a weight check    Pts weight today was 230 lbs and 8 oz   
Bed/Stretcher in lowest position, wheels locked, appropriate side rails in place/Call bell, personal items and telephone in reach/Instruct patient to call for assistance before getting out of bed/chair/stretcher/Non-slip footwear applied when patient is off stretcher/[place] to call system/Physically safe environment - no spills, clutter or unnecessary equipment/Purposeful proactive rounding/Room/bathroom lighting operational, light cord in reach

## 2024-08-19 ASSESSMENT — ENCOUNTER SYMPTOMS
ABDOMINAL PAIN: 0
CHEST TIGHTNESS: 0
DIARRHEA: 0
BLOOD IN STOOL: 0
CONSTIPATION: 0
ANAL BLEEDING: 0
CHOKING: 0
WHEEZING: 0
NAUSEA: 0
VOMITING: 0
COUGH: 0
SHORTNESS OF BREATH: 0

## 2024-08-19 ASSESSMENT — VISUAL ACUITY: OU: 1

## 2024-08-20 ENCOUNTER — CARE COORDINATION (OUTPATIENT)
Dept: CASE MANAGEMENT | Age: 54
End: 2024-08-20

## 2024-08-20 ENCOUNTER — CARE COORDINATION (OUTPATIENT)
Dept: CARE COORDINATION | Age: 54
End: 2024-08-20

## 2024-08-20 NOTE — CARE COORDINATION
Date/Time:  8/20/2024 1:48 PM  LPN attempted to reach patient by telephone regarding red alert in remote patient monitoring program. Left HIPPA compliant message requesting a return call. Will attempt to reach patient again.    [facility] red alert for BP  137/102  ENROLLED IN RPM for HTN

## 2024-08-20 NOTE — CARE COORDINATION
Remote Alert Monitoring Note      Date/Time:  2024 1:36 PM  Patient Current Location: Home: [address] [place] [address]    LPN contacted patient by telephone regarding red alert received for blood pressure reading (137/102). Verified patients name and  as identifiers.    Rpm alert to be reviewed by the provider                         Background: High Blood-Pressure     Refer to 911 immediately if:  Patient unresponsive or unable to provide history  Change in cognition or sudden confusion  Patient unable to respond in complete sentences  Intense chest pain/tightness  Any concern for any clinical emergency  Red Alert: Provider response time of 1 hr required for any red alert requiring intervention  Yellow Alert: Provider response time of 3hr required for any escalated yellow alert        BP Triage  Are you having any Chest Pain? no   Are you having any Shortness of Breath? no   Do you have a headache or have any vision changes? no   Are you having any numbness or tingling? no   Are you having any other health concerns or issues? no     Have you taken your medications as instructed by your doctor today? Yes       Clinical Interventions: Reviewed and followed up on alerts and treatments-. Pt is asymptomatic and in no apparent distress, speaking in full sentences.  He states he is feeling fine and took his medications early this am.He states he is not home right now to recheck, but will do so when he returns.Will await update.        Plan/Follow Up: Will continue to review, monitor and address alerts with follow up based on severity of symptoms and risk factors.    DUSTY Mac [facility]/ CTN/ Remote Patient monitoring  [phone]

## 2024-08-21 ENCOUNTER — CARE COORDINATION (OUTPATIENT)
Dept: CARE COORDINATION | Age: 54
End: 2024-08-21

## 2024-08-21 NOTE — CARE COORDINATION
Ambulatory Care Coordination Note     8/21/2024 10:40 AM     Patient outreach attempt by this ACM today to perform care management follow up . ACM was unable to reach the patient by telephone today; left voice message requesting a return phone call to this ACM.     ACM: Malorie Godoy RN     Care Summary Note: He has completed 7 days of RPM vitals the past 14 days.    PCP/Specialist follow up:   Future Appointments         Provider Specialty Dept Phone    8/27/2024 12:15 PM Joann Simmons MD Family Medicine [phone]    10/29/2024 9:00 AM Joann Simmons MD Family Medicine [phone]            Follow Up:   Plan for next ACM outreach in approximately 1 week to complete:  - disease specific assessments  - RPM.

## 2024-08-27 ENCOUNTER — OFFICE VISIT (OUTPATIENT)
Dept: FAMILY MEDICINE CLINIC | Age: 54
End: 2024-08-27
Payer: MEDICARE

## 2024-08-27 VITALS
OXYGEN SATURATION: 96 % | SYSTOLIC BLOOD PRESSURE: 120 MMHG | DIASTOLIC BLOOD PRESSURE: 80 MMHG | BODY MASS INDEX: 34.29 KG/M2 | WEIGHT: 239 LBS | HEART RATE: 107 BPM

## 2024-08-27 DIAGNOSIS — Z85.528 HISTORY OF RENAL CELL CARCINOMA: ICD-10-CM

## 2024-08-27 DIAGNOSIS — E23.2 DIABETES INSIPIDUS (HCC): ICD-10-CM

## 2024-08-27 DIAGNOSIS — I10 PRIMARY HYPERTENSION: Primary | ICD-10-CM

## 2024-08-27 DIAGNOSIS — R06.02 SHORTNESS OF BREATH: ICD-10-CM

## 2024-08-27 DIAGNOSIS — R09.81 NASAL CONGESTION: ICD-10-CM

## 2024-08-27 DIAGNOSIS — E78.2 MIXED HYPERLIPIDEMIA: ICD-10-CM

## 2024-08-27 DIAGNOSIS — Z12.5 ENCOUNTER FOR SCREENING FOR MALIGNANT NEOPLASM OF PROSTATE: ICD-10-CM

## 2024-08-27 DIAGNOSIS — R60.0 PERIPHERAL EDEMA: ICD-10-CM

## 2024-08-27 DIAGNOSIS — Z13.29 SCREENING FOR THYROID DISORDER: ICD-10-CM

## 2024-08-27 DIAGNOSIS — Z13.0 SCREENING, ANEMIA, DEFICIENCY, IRON: ICD-10-CM

## 2024-08-27 PROCEDURE — 3079F DIAST BP 80-89 MM HG: CPT | Performed by: INTERNAL MEDICINE

## 2024-08-27 PROCEDURE — 3074F SYST BP LT 130 MM HG: CPT | Performed by: INTERNAL MEDICINE

## 2024-08-27 PROCEDURE — 99214 OFFICE O/P EST MOD 30 MIN: CPT | Performed by: INTERNAL MEDICINE

## 2024-08-27 RX ORDER — METOPROLOL SUCCINATE 50 MG/1
50 TABLET, EXTENDED RELEASE ORAL DAILY
Qty: 30 TABLET | Refills: 1 | Status: SHIPPED | OUTPATIENT
Start: 2024-08-27

## 2024-08-27 RX ORDER — FUROSEMIDE 20 MG
20 TABLET ORAL DAILY
Qty: 90 TABLET | Refills: 1 | Status: SHIPPED | OUTPATIENT
Start: 2024-09-03

## 2024-08-27 RX ORDER — FUROSEMIDE 20 MG
20 TABLET ORAL 2 TIMES DAILY
Qty: 14 TABLET | Refills: 0 | Status: SHIPPED | OUTPATIENT
Start: 2024-08-27 | End: 2024-09-03

## 2024-08-27 NOTE — PROGRESS NOTES
DAILY Yes Fara Ca APRN - NP   pramipexole (MIRAPEX) 0.25 MG tablet TAKE ONE TABLET BY MOUTH ONCE NIGHTLY Yes Fara Ca APRN - NP   hydrOXYzine HCl (ATARAX) 50 MG tablet TAKE ONE TABLET BY MOUTH ONCE NIGHTLY Yes Fara Ca APRN - NP   tamsulosin (FLOMAX) 0.4 MG capsule Take 1 capsule by mouth daily Yes Joann Simmons MD   hydroCHLOROthiazide 12.5 MG tablet TAKE ONE TABLET BY MOUTH EVERY MORNING Yes Joann Simmons MD   amLODIPine (NORVASC) 10 MG tablet TAKE 1 TABLET BY MOUTH DAILY Yes Joann Simmons MD   pantoprazole (PROTONIX) 20 MG tablet TAKE 1 TABLET BY MOUTH DAILY Yes Joann Simmons MD   atorvastatin (LIPITOR) 20 MG tablet TAKE 1 TABLET BY MOUTH DAILY Yes Joann Simmons MD   vitamin D (D3 5000) 125 MCG (5000 UT) CAPS capsule Take 1 capsule by mouth daily Yes Carrie Olson MD   aMILoride (MIDAMOR) 5 MG tablet TAKE ONE TABLET BY MOUTH DAILY Yes Joann Simmons MD   buPROPion (WELLBUTRIN XL) 150 MG extended release tablet Take 1 tablet by mouth daily Yes Carrie Olson MD   pregabalin (LYRICA) 150 MG capsule Take 1 capsule by mouth in the morning and at bedtime. Yes Carrie Olson MD   celecoxib (CELEBREX) 100 MG capsule Take 2 capsules by mouth daily Yes Carrie Olson MD   diclofenac sodium (VOLTAREN) 1 % GEL  Yes Carrie Olson MD   acetaminophen (TYLENOL) 500 MG tablet Take 2 tablets by mouth at bedtime Yes Carrie Olson MD   OXcarbazepine (TRILEPTAL) 600 MG tablet oxcarbazepine 600 mg tablet Yes Carrie Olson MD   buPROPion (WELLBUTRIN XL) 300 MG extended release tablet bupropion HCl  mg 24 hr tablet, extended release Yes Carrie Olson MD   FLUoxetine (PROZAC) 40 MG capsule 20 mg Yes Carrie Olson MD   chlorhexidine (PERIDEX) 0.12 % solution   Carrie Olson MD       Review of Systems     Review of Systems   Constitutional:  Negative for fatigue, fever and unexpected weight change.   HENT:

## 2024-08-28 ENCOUNTER — CARE COORDINATION (OUTPATIENT)
Dept: CASE MANAGEMENT | Age: 54
End: 2024-08-28

## 2024-08-28 ENCOUNTER — CARE COORDINATION (OUTPATIENT)
Dept: CARE COORDINATION | Age: 54
End: 2024-08-28

## 2024-08-28 NOTE — CARE COORDINATION
Ambulatory Care Coordination Note     8/28/2024 2:32 PM     Patient outreach attempt by this ACM today to perform care management follow up . ACM was unable to reach the patient by telephone today; left voice message requesting a return phone call to this ACM.     ACM: Malorie Godoy RN     Care Summary Note: He saw PCP yesterday who prescribed Lasix 20 mg twice daily for a week then take daily after that. Weight is up 8.5 lbs from when started on 6/17. Encouraged to also monitor symptoms like leg swelling, shortness of breath, any chest pain or worsening cough or fatigue and notify doctor.    PCP/Specialist follow up:   Future Appointments         Provider Specialty Dept Phone    9/24/2024 3:30 PM Joann Simmons MD Family Medicine [phone]    10/29/2024 9:00 AM Joann Simmons MD Family Medicine [phone]            Follow Up:   Plan for next ACM outreach in approximately 1 week to complete:  - disease specific assessments  - goal progression  - RPM.

## 2024-08-28 NOTE — CARE COORDINATION
Date/Time:  8/28/2024 1:43 PM  LPN attempted to reach patient by telephone regarding yellow alert no BP or PULSE OX IN 3 DAYS in remote patient monitoring program. Left HIPPA compliant message requesting a return call. Will attempt to reach patient again.

## 2024-08-29 ENCOUNTER — CARE COORDINATION (OUTPATIENT)
Dept: CARE COORDINATION | Age: 54
End: 2024-08-29

## 2024-08-29 ENCOUNTER — HOSPITAL ENCOUNTER (OUTPATIENT)
Age: 54
Setting detail: SPECIMEN
Discharge: HOME | End: 2024-08-29

## 2024-08-29 DIAGNOSIS — Z13.0 SCREENING, ANEMIA, DEFICIENCY, IRON: ICD-10-CM

## 2024-08-29 DIAGNOSIS — Z13.29 SCREENING FOR THYROID DISORDER: ICD-10-CM

## 2024-08-29 DIAGNOSIS — Z12.5 ENCOUNTER FOR SCREENING FOR MALIGNANT NEOPLASM OF PROSTATE: ICD-10-CM

## 2024-08-29 DIAGNOSIS — E78.2 MIXED HYPERLIPIDEMIA: ICD-10-CM

## 2024-08-29 LAB
ALBUMIN SERPL-MCNC: 4.2 G/DL (ref 3.5–5.2)
ALBUMIN/GLOB SERPL: 2 {RATIO} (ref 1–2.5)
ALP SERPL-CCNC: 115 U/L (ref 40–129)
ALT SERPL-CCNC: 38 U/L (ref 10–50)
ANION GAP SERPL CALCULATED.3IONS-SCNC: 10 MMOL/L (ref 9–16)
AST SERPL-CCNC: 36 U/L (ref 10–50)
BASOPHILS # BLD: 0.04 K/UL (ref 0–0.2)
BASOPHILS NFR BLD: 1 % (ref 0–2)
BILIRUB SERPL-MCNC: 0.5 MG/DL (ref 0–1.2)
BUN SERPL-MCNC: 13 MG/DL (ref 6–20)
CALCIUM SERPL-MCNC: 9.2 MG/DL (ref 8.6–10.4)
CHLORIDE SERPL-SCNC: 109 MMOL/L (ref 98–107)
CHOLEST SERPL-MCNC: 186 MG/DL (ref 0–199)
CHOLESTEROL/HDL RATIO: 4
CO2 SERPL-SCNC: 23 MMOL/L (ref 20–31)
CREAT SERPL-MCNC: 0.9 MG/DL (ref 0.7–1.2)
EOSINOPHIL # BLD: 0.25 K/UL (ref 0–0.44)
EOSINOPHILS RELATIVE PERCENT: 5 % (ref 1–4)
ERYTHROCYTE [DISTWIDTH] IN BLOOD BY AUTOMATED COUNT: 12.3 % (ref 11.8–14.4)
GFR, ESTIMATED: >90 ML/MIN/1.73M2
GLUCOSE SERPL-MCNC: 111 MG/DL (ref 74–99)
HCT VFR BLD AUTO: 44.8 % (ref 40.7–50.3)
HDLC SERPL-MCNC: 44 MG/DL
HGB BLD-MCNC: 15.3 G/DL (ref 13–17)
IMM GRANULOCYTES # BLD AUTO: 0.06 K/UL (ref 0–0.3)
IMM GRANULOCYTES NFR BLD: 1 %
LDLC SERPL CALC-MCNC: 79 MG/DL (ref 0–100)
LYMPHOCYTES NFR BLD: 1.53 K/UL (ref 1.1–3.7)
LYMPHOCYTES RELATIVE PERCENT: 31 % (ref 24–43)
MCH RBC QN AUTO: 29.7 PG (ref 25.2–33.5)
MCHC RBC AUTO-ENTMCNC: 34.2 G/DL (ref 28.4–34.8)
MCV RBC AUTO: 87 FL (ref 82.6–102.9)
MONOCYTES NFR BLD: 0.38 K/UL (ref 0.1–1.2)
MONOCYTES NFR BLD: 8 % (ref 3–12)
NEUTROPHILS NFR BLD: 54 % (ref 36–65)
NEUTS SEG NFR BLD: 2.67 K/UL (ref 1.5–8.1)
NRBC BLD-RTO: 0 PER 100 WBC
PLATELET # BLD AUTO: 248 K/UL (ref 138–453)
PMV BLD AUTO: 9.7 FL (ref 8.1–13.5)
POTASSIUM SERPL-SCNC: 4.4 MMOL/L (ref 3.7–5.3)
PROT SERPL-MCNC: 6.3 G/DL (ref 6.6–8.7)
PSA SERPL-MCNC: 0.5 NG/ML (ref 0–4)
RBC # BLD AUTO: 5.15 M/UL (ref 4.21–5.77)
SODIUM SERPL-SCNC: 142 MMOL/L (ref 136–145)
TRIGL SERPL-MCNC: 320 MG/DL (ref 0–149)
TSH SERPL DL<=0.05 MIU/L-ACNC: 0.81 UIU/ML (ref 0.27–4.2)
VLDLC SERPL CALC-MCNC: 64 MG/DL
WBC OTHER # BLD: 4.9 K/UL (ref 3.5–11.3)

## 2024-08-29 NOTE — CARE COORDINATION
Ambulatory Care Coordination Note     2024 2:37 PM     Patient Current Location:  [place]     Patient contacted the ACM by telephone. Verified name and  with patient as identifiers.         ACM: Malorie Godoy RN     Challenges to be reviewed by the provider   Additional needs identified to be addressed with provider No  none               Method of communication with provider: none.    Care Summary Note: He returned writer's call. He will get Lasix and metoprolol from pharmacy today. Discussed taking Lasix about 8 am and 5 pm so isn't urinating all night, he stated he is up frequently all night anyways to urinate, that's why so tired and fatigued all the time. The excess weight and swelling in legs may be causing fatigue also.  He hasn't checked BP yet today with RPM, he is not home right now, will check it later. He is aware should be checked by noon every day so any problems with BP can be addressed with provider the same day. He says they are still up but have improved, will improve more after starts taking metoprolol.  Scheduled for echo next week and sleep study the following week.      Offered patient enrollment in the Remote Patient Monitoring (RPM) program for in-home monitoring: Yes, patient enrolled; current status is activated and monitoring.     Assessments Completed:   General Assessment    Do you have any symptoms that are causing concern?: Yes  Reported Symptoms: Fatigue, Weight Gain, Other (Comment: leg swelling)          Medications Reviewed:   Completed during a previous call     Advance Care Planning:   Not reviewed during this call     Care Planning:   Not completed during this call    PCP/Specialist follow up:   Future Appointments         Provider Specialty Dept Phone    9/3/2024 2:00 PM (Arrive by 1:30 PM) [facility] ECHO 1 Cardiology [phone]    2024 7:30 PM [facility]  [facility] [phone]    2024 3:30 PM Joann Simmons MD Family Medicine [phone]

## 2024-09-01 DIAGNOSIS — I10 PRIMARY HYPERTENSION: ICD-10-CM

## 2024-09-02 ASSESSMENT — ENCOUNTER SYMPTOMS
ABDOMINAL PAIN: 0
ANAL BLEEDING: 0
COUGH: 0
CHOKING: 0
NAUSEA: 0
VOMITING: 0
DIARRHEA: 0
CHEST TIGHTNESS: 0
SHORTNESS OF BREATH: 0
WHEEZING: 0
CONSTIPATION: 0
BLOOD IN STOOL: 0

## 2024-09-02 ASSESSMENT — VISUAL ACUITY: OU: 1

## 2024-09-03 ENCOUNTER — HOSPITAL ENCOUNTER (OUTPATIENT)
Age: 54
Discharge: HOME | End: 2024-09-05
Payer: MEDICARE

## 2024-09-03 VITALS — BODY MASS INDEX: 33.79 KG/M2 | WEIGHT: 236 LBS | HEIGHT: 70 IN

## 2024-09-03 DIAGNOSIS — E78.5 DYSLIPIDEMIA: ICD-10-CM

## 2024-09-03 DIAGNOSIS — R73.01 ELEVATED FASTING GLUCOSE: Primary | ICD-10-CM

## 2024-09-03 DIAGNOSIS — R06.02 SHORTNESS OF BREATH: ICD-10-CM

## 2024-09-03 DIAGNOSIS — K21.9 GASTROESOPHAGEAL REFLUX DISEASE WITHOUT ESOPHAGITIS: ICD-10-CM

## 2024-09-03 LAB
ECHO AO ROOT DIAM: 3.2 CM
ECHO AO ROOT INDEX: 1.43 CM/M2
ECHO AV AREA PEAK VELOCITY: 3.3 CM2
ECHO AV AREA VTI: 3.4 CM2
ECHO AV AREA/BSA PEAK VELOCITY: 1.5 CM2/M2
ECHO AV AREA/BSA VTI: 1.5 CM2/M2
ECHO AV MEAN GRADIENT: 4 MMHG
ECHO AV MEAN VELOCITY: 1 M/S
ECHO AV PEAK GRADIENT: 7 MMHG
ECHO AV PEAK VELOCITY: 1.4 M/S
ECHO AV VELOCITY RATIO: 0.79
ECHO AV VTI: 30.7 CM
ECHO BSA: 2.3 M2
ECHO IVC EXP: 1.7 CM
ECHO IVC INSP: 0.8 CM
ECHO LA AREA 2C: 19.1 CM2
ECHO LA AREA 4C: 13.7 CM2
ECHO LA DIAMETER INDEX: 1.79 CM/M2
ECHO LA DIAMETER: 4 CM
ECHO LA MAJOR AXIS: 4.5 CM
ECHO LA MINOR AXIS: 5.6 CM
ECHO LA TO AORTIC ROOT RATIO: 1.25
ECHO LA VOL BP: 47 ML (ref 18–58)
ECHO LA VOL MOD A2C: 55 ML (ref 18–58)
ECHO LA VOL MOD A4C: 32 ML (ref 18–58)
ECHO LA VOL/BSA BIPLANE: 21 ML/M2 (ref 16–34)
ECHO LA VOLUME INDEX MOD A2C: 25 ML/M2 (ref 16–34)
ECHO LA VOLUME INDEX MOD A4C: 14 ML/M2 (ref 16–34)
ECHO LV E' LATERAL VELOCITY: 10 CM/S
ECHO LV E' SEPTAL VELOCITY: 7 CM/S
ECHO LV EF PHYSICIAN: 65 %
ECHO LV FRACTIONAL SHORTENING: 26 % (ref 28–44)
ECHO LV INTERNAL DIMENSION DIASTOLE INDEX: 1.92 CM/M2
ECHO LV INTERNAL DIMENSION DIASTOLIC: 4.3 CM (ref 4.2–5.9)
ECHO LV INTERNAL DIMENSION SYSTOLIC INDEX: 1.43 CM/M2
ECHO LV INTERNAL DIMENSION SYSTOLIC: 3.2 CM
ECHO LV IVSD: 1.2 CM (ref 0.6–1)
ECHO LV MASS 2D: 173.6 G (ref 88–224)
ECHO LV MASS INDEX 2D: 77.5 G/M2 (ref 49–115)
ECHO LV POSTERIOR WALL DIASTOLIC: 1.1 CM (ref 0.6–1)
ECHO LV RELATIVE WALL THICKNESS RATIO: 0.51
ECHO LVOT AREA: 4.2 CM2
ECHO LVOT AV VTI INDEX: 0.83
ECHO LVOT DIAM: 2.3 CM
ECHO LVOT MEAN GRADIENT: 2 MMHG
ECHO LVOT PEAK GRADIENT: 5 MMHG
ECHO LVOT PEAK VELOCITY: 1.1 M/S
ECHO LVOT STROKE VOLUME INDEX: 47.1 ML/M2
ECHO LVOT SV: 105.5 ML
ECHO LVOT VTI: 25.4 CM
ECHO MV A VELOCITY: 0.37 M/S
ECHO MV E DECELERATION TIME (DT): 176 MS
ECHO MV E VELOCITY: 0.68 M/S
ECHO MV E/A RATIO: 1.84
ECHO MV E/E' LATERAL: 6.8
ECHO MV E/E' RATIO (AVERAGED): 8.26
ECHO MV E/E' SEPTAL: 9.71
ECHO RV BASAL DIMENSION: 2.8 CM
ECHO RV FREE WALL PEAK S': 14 CM/S

## 2024-09-03 PROCEDURE — 93306 TTE W/DOPPLER COMPLETE: CPT | Performed by: INTERNAL MEDICINE

## 2024-09-03 PROCEDURE — 93306 TTE W/DOPPLER COMPLETE: CPT

## 2024-09-03 RX ORDER — PANTOPRAZOLE SODIUM 20 MG/1
TABLET, DELAYED RELEASE ORAL
Qty: 90 TABLET | Refills: 1 | Status: SHIPPED | OUTPATIENT
Start: 2024-09-03

## 2024-09-03 RX ORDER — ATORVASTATIN CALCIUM 20 MG/1
20 TABLET, FILM COATED ORAL DAILY
Qty: 90 TABLET | Refills: 1 | Status: SHIPPED | OUTPATIENT
Start: 2024-09-03

## 2024-09-03 RX ORDER — FUROSEMIDE 20 MG/1
20 TABLET ORAL 2 TIMES DAILY
Qty: 14 TABLET | Refills: 0 | OUTPATIENT
Start: 2024-09-03 | End: 2024-09-10

## 2024-09-03 NOTE — TELEPHONE ENCOUNTER
Last visit: 8/27/24  Last Med refill: 3/11/24  Does patient have enough medication for 72 hours: Yes    Next Visit Date:  Future Appointments   Date Time Provider Department Center   9/12/2024  7:30 PM [facility] RM [address] [facility]   9/24/2024  3:30 PM Joann Simmons MD [facility] DEP   10/29/2024  9:00 AM Joann Simmons MD [facility] DEP   11/12/2024  9:40 AM Lowell Arevalo MD [facility] AMB       Health Maintenance   Topic Date Due    COVID-19 Vaccine (3 - 2023-24 season) 09/01/2024    Flu vaccine (1) 10/13/2024 (Originally 8/1/2024)    Hepatitis B vaccine (1 of 3 - 19+ 3-dose series) 08/13/2025 (Originally 10/14/1989)    Depression Monitoring  04/29/2025    Colorectal Cancer Screen  06/11/2025    Lipids  08/29/2025    Diabetes screen  02/08/2027    DTaP/Tdap/Td vaccine (2 - Td or Tdap) 10/01/2030    Annual Wellness Visit (Medicare Advantage)  Completed    Shingles vaccine  Completed    Hepatitis C screen  Completed    HIV screen  Completed    Hepatitis A vaccine  Aged Out    Hib vaccine  Aged Out    Polio vaccine  Aged Out    Meningococcal (ACWY) vaccine  Aged Out    Pneumococcal 0-64 years Vaccine  Aged Out    Depression Screen  Discontinued       Hemoglobin A1C (%)   Date Value   02/08/2024 5.3   01/21/2022 5.0   05/11/2017 4.7             ( goal A1C is < 7)   No components found for: \"LABMICR\"  No components found for: \"LDLCHOLESTEROL\", \"LDLCALC\"    (goal LDL is <100)   AST (U/L)   Date Value   08/29/2024 36     ALT (U/L)   Date Value   08/29/2024 38     BUN (mg/dL)   Date Value   08/29/2024 13     BP Readings from Last 3 Encounters:   08/27/24 120/80   08/13/24 116/74   09/01/24 131/82          (goal 120/80)    All Future Testing planned in [facility]  Lab Frequency Next Occurrence   Sleep Study with PAP Titration Once 08/27/2024   Hemoglobin A1C Once 09/03/2024               Patient Active Problem List:     Bipolar disorder (HCC)     HTN (hypertension)     Change in

## 2024-09-04 ENCOUNTER — TELEPHONE (OUTPATIENT)
Dept: FAMILY MEDICINE CLINIC | Age: 54
End: 2024-09-04

## 2024-09-09 ENCOUNTER — CARE COORDINATION (OUTPATIENT)
Dept: CARE COORDINATION | Age: 54
End: 2024-09-09

## 2024-09-10 ENCOUNTER — CARE COORDINATION (OUTPATIENT)
Dept: CASE MANAGEMENT | Age: 54
End: 2024-09-10

## 2024-09-10 ENCOUNTER — HOSPITAL ENCOUNTER (OUTPATIENT)
Age: 54
Setting detail: SPECIMEN
Discharge: HOME | End: 2024-09-10

## 2024-09-10 DIAGNOSIS — R73.01 ELEVATED FASTING GLUCOSE: ICD-10-CM

## 2024-09-10 LAB
EST. AVERAGE GLUCOSE BLD GHB EST-MCNC: 108 MG/DL
HBA1C MFR BLD: 5.4 % (ref 4–6)

## 2024-09-12 ENCOUNTER — HOSPITAL ENCOUNTER (OUTPATIENT)
Dept: SLEEP CENTER | Age: 54
Discharge: HOME | End: 2024-09-14
Payer: MEDICARE

## 2024-09-12 DIAGNOSIS — G47.33 OSA (OBSTRUCTIVE SLEEP APNEA): ICD-10-CM

## 2024-09-12 PROCEDURE — 95811 POLYSOM 6/>YRS CPAP 4/> PARM: CPT

## 2024-09-13 ENCOUNTER — CARE COORDINATION (OUTPATIENT)
Dept: CASE MANAGEMENT | Age: 54
End: 2024-09-13

## 2024-09-13 VITALS
OXYGEN SATURATION: 94 % | RESPIRATION RATE: 16 BRPM | BODY MASS INDEX: 34.22 KG/M2 | HEART RATE: 67 BPM | WEIGHT: 239 LBS | HEIGHT: 70 IN

## 2024-09-16 RX ORDER — AMILORIDE HYDROCHLORIDE 5 MG/1
5 TABLET ORAL DAILY
Qty: 90 TABLET | Refills: 1 | Status: SHIPPED | OUTPATIENT
Start: 2024-09-16

## 2024-09-18 ENCOUNTER — CARE COORDINATION (OUTPATIENT)
Dept: CARE COORDINATION | Age: 54
End: 2024-09-18

## 2024-09-18 ENCOUNTER — CARE COORDINATION (OUTPATIENT)
Dept: CASE MANAGEMENT | Age: 54
End: 2024-09-18

## 2024-09-18 PROBLEM — G47.33 OSA (OBSTRUCTIVE SLEEP APNEA): Status: ACTIVE | Noted: 2024-09-18

## 2024-09-18 LAB — STATUS: NORMAL

## 2024-09-20 ENCOUNTER — CARE COORDINATION (OUTPATIENT)
Dept: CASE MANAGEMENT | Age: 54
End: 2024-09-20

## 2024-09-22 DIAGNOSIS — G47.33 OSA (OBSTRUCTIVE SLEEP APNEA): Primary | ICD-10-CM

## 2024-09-24 ENCOUNTER — OFFICE VISIT (OUTPATIENT)
Dept: FAMILY MEDICINE CLINIC | Age: 54
End: 2024-09-24

## 2024-09-24 VITALS
OXYGEN SATURATION: 95 % | HEART RATE: 90 BPM | BODY MASS INDEX: 34.87 KG/M2 | DIASTOLIC BLOOD PRESSURE: 72 MMHG | WEIGHT: 243 LBS | SYSTOLIC BLOOD PRESSURE: 110 MMHG

## 2024-09-24 DIAGNOSIS — I87.2 EDEMA OF BOTH LOWER LEGS DUE TO PERIPHERAL VENOUS INSUFFICIENCY: ICD-10-CM

## 2024-09-24 DIAGNOSIS — I10 PRIMARY HYPERTENSION: ICD-10-CM

## 2024-09-24 DIAGNOSIS — E78.2 MIXED HYPERLIPIDEMIA: ICD-10-CM

## 2024-09-24 DIAGNOSIS — R06.02 SHORTNESS OF BREATH: Primary | ICD-10-CM

## 2024-09-24 DIAGNOSIS — R60.0 EDEMA OF BOTH LOWER LEGS DUE TO PERIPHERAL VENOUS INSUFFICIENCY: ICD-10-CM

## 2024-09-24 DIAGNOSIS — E23.2 DIABETES INSIPIDUS (HCC): ICD-10-CM

## 2024-09-30 ENCOUNTER — CARE COORDINATION (OUTPATIENT)
Dept: CARE COORDINATION | Age: 54
End: 2024-09-30

## 2024-09-30 NOTE — CARE COORDINATION
Ambulatory Care Coordination Note     9/30/2024 9:47 AM     Patient outreach attempt by this ACM today to perform care management follow up . [facility] was unable to reach the patient by telephone today; phone number has been disconnected.      ACM: Malorie Godoy RN     Care Summary Note: He left VM message on writer's phone 9/27, stated he got a new phone and can't get it to to work with the equipment (RPM). Attempted to call him back but may be disconnected. Sent him a [employer] message:    \"Neri Lazaro. This is Malorie the RN. I got your message about the remote patient monitoring equipment not working with your new phone. I tried to call you at that number but message says \"call cannot be completed as dialed. Please check the number and dial again\".    If you're interested in continuing doing the monitoring of the vitals, maybe we can return the kit that you have and send you another kit that comes with a bluetooth tablet you could use instead of your phone.  If you're not interested in keeping or doing the monitoring we can just have [employer] come  the kit. Just call me back at [phone] to let me know.\"    PCP/Specialist follow up:   Future Appointments         Provider Specialty Dept Phone    10/29/2024 9:00 AM Joann Simmons MD Family Medicine [phone]    11/12/2024 9:40 AM Lowell Arevalo MD Otolaryngology [phone]            Follow Up:   Plan for next AC outreach in approximately 1 week to complete:  - advance care planning  - goal progression  - RPM.

## 2024-10-01 ENCOUNTER — CARE COORDINATION (OUTPATIENT)
Dept: CASE MANAGEMENT | Age: 54
End: 2024-10-01

## 2024-10-01 NOTE — CARE COORDINATION
Date/Time:  10/1/2024 2:13 PM  LPN attempted to reach patient by telephone regarding yellow alert no metrics in 5 days in remote patient monitoring program. Left HIPPA compliant message requesting a return call. Will attempt to reach patient again.

## 2024-10-07 ENCOUNTER — CARE COORDINATION (OUTPATIENT)
Dept: CARE COORDINATION | Age: 54
End: 2024-10-07

## 2024-10-07 DIAGNOSIS — I10 HYPERTENSION, UNSPECIFIED TYPE: Primary | ICD-10-CM

## 2024-10-07 NOTE — CARE COORDINATION
Ambulatory Care Coordination Note     10/7/2024 1:26 PM     Patient Current Location:   [place]     ACM contacted the patient by telephone. Verified name and  with patient as identifiers.         ACM: Malorie Godoy RN     Challenges to be reviewed by the provider   Additional needs identified to be addressed with provider No  none               Method of communication with provider: none.    Has the patient been seen in the ED since your last call? no    Care Summary Note: He had to make an emergency trip to [place] so hasn't been able to do RPM vitals, will be coming home tomorrow.  He got RPM to work but he has to manually enter the numbers in his phone on the gus. He is agreeable to returning the RPM kit, has already had for 113 days and doesn't feel he needs to keep doing it.  Reviewed upcoming PCP appt and needs to do spirometry testing that was ordered, writer gave him the scheduling phone number to call to schedule.  He is feeling well, no symptoms or concerns. Graduated from care management, encouraged he call writer for future concerns or questions. Has upcoming appts with PCP and ENT.    Offered patient enrollment in the Remote Patient Monitoring (RPM) program for in-home monitoring: Yes, patient enrolled; current status is activated and monitoring.  Remote Patient Monitoring Graduation      Date/Time:  10/7/2024 1:57 PM  Patient Current Location:  [place]  Patient has graduated from the Remote Patient Monitoring program on 10/7/2024.   RPM goals have been met at this time.      Patient has been provided instruction on process to return RPM equipment and RPM has been deactivated.     Patient has ACM's contact information for any further questions, concerns, or needs.       Assessments Completed:   Hypertension - Encounter Level    Symptom course: no change      ,   General Assessment    Do you have any symptoms that are causing concern?: No          Medications Reviewed:   Completed during a previous call

## 2024-10-07 NOTE — PROGRESS NOTES
Remote Patient Order Discontinued    Received request from Malorie Godoy RN   to discontinue order for remote patient monitoring of HTN and order completed.

## 2024-10-09 ENCOUNTER — CARE COORDINATION (OUTPATIENT)
Dept: PRIMARY CARE CLINIC | Age: 54
End: 2024-10-09

## 2024-10-09 NOTE — CARE COORDINATION
Colten Browne  10/9/24    Care Coordination  placed call to Patient  to arrange RPM kit  through [employer].     CCSS spoke to Patient reviewed with Patient how to pack equipment in original packing. [employer] will  equipment in 2-4 days.   All questions and concerns answered.

## 2024-10-24 DIAGNOSIS — I10 PRIMARY HYPERTENSION: ICD-10-CM

## 2024-10-24 RX ORDER — METOPROLOL SUCCINATE 50 MG/1
50 TABLET, EXTENDED RELEASE ORAL DAILY
Qty: 90 TABLET | Refills: 1 | Status: SHIPPED | OUTPATIENT
Start: 2024-10-24

## 2024-10-24 NOTE — TELEPHONE ENCOUNTER
lumbar region     Primary insomnia     History of renal cell carcinoma     Scalp cyst     Restless leg     Gastroesophageal reflux disease without esophagitis     SCOT (obstructive sleep apnea)

## 2024-10-28 DIAGNOSIS — N40.1 BENIGN PROSTATIC HYPERPLASIA WITH NOCTURIA: ICD-10-CM

## 2024-10-28 DIAGNOSIS — R35.1 BENIGN PROSTATIC HYPERPLASIA WITH NOCTURIA: ICD-10-CM

## 2024-10-28 RX ORDER — TAMSULOSIN HYDROCHLORIDE 0.4 MG/1
0.4 CAPSULE ORAL DAILY
Qty: 30 CAPSULE | Refills: 5 | Status: SHIPPED | OUTPATIENT
Start: 2024-10-28 | End: 2024-10-29

## 2024-10-28 NOTE — TELEPHONE ENCOUNTER
Last visit: 09/24/2024  Last Med refill: 09/30/2024  Does patient have enough medication for 72 hours: No:     Next Visit Date:  Future Appointments   Date Time Provider Department Center   10/29/2024  9:00 AM Joann Simmons MD [facility] ECC DEP   11/1/2024 10:30 AM ST[facility] PFT RM 1 [facility]FT [facility]   11/12/2024  9:40 AM Lowell Arevalo MD ED Replaced by [facility] AMB       Health Maintenance   Topic Date Due    Flu vaccine (1) 08/01/2024    Hepatitis B vaccine (1 of 3 - 19+ 3-dose series) 08/13/2025 (Originally [birth year]1989)    COVID-19 Vaccine (3 - 2023-24 season) 09/24/2025 (Originally 9/1/2024)    Depression Monitoring  04/29/2025    Colorectal Cancer Screen  06/11/2025    Lipids  08/29/2025    Diabetes screen  09/10/2027    DTaP/Tdap/Td vaccine (2 - Td or Tdap) 10/01/2030    Annual Wellness Visit (Medicare Advantage)  Completed    Shingles vaccine  Completed    Hepatitis C screen  Completed    HIV screen  Completed    Hepatitis A vaccine  Aged Out    Hib vaccine  Aged Out    Polio vaccine  Aged Out    Meningococcal (ACWY) vaccine  Aged Out    Pneumococcal 0-64 years Vaccine  Aged Out    Depression Screen  Discontinued       Hemoglobin A1C (%)   Date Value   09/10/2024 5.4   02/08/2024 5.3   01/21/2022 5.0             ( goal A1C is < 7)   No components found for: \"LABMICR\"  No components found for: \"LDLCHOLESTEROL\", \"LDLCALC\"    (goal LDL is <100)   AST (U/L)   Date Value   08/29/2024 36     ALT (U/L)   Date Value   08/29/2024 38     BUN (mg/dL)   Date Value   08/29/2024 13     BP Readings from Last 3 Encounters:   09/24/24 110/72   08/27/24 120/80   08/13/24 116/74          (goal 120/80)    All Future Testing planned in [facility]  Lab Frequency Next Occurrence   Spirometry With Bronchodilator Once 10/24/2024               Patient Active Problem List:     Bipolar disorder (HCC)     HTN (hypertension)     Change in bowel habits     Diverticulosis     Internal hemorrhoid     Mixed hyperlipidemia     Bipolar disorder

## 2024-10-29 ENCOUNTER — OFFICE VISIT (OUTPATIENT)
Dept: FAMILY MEDICINE CLINIC | Age: 54
End: 2024-10-29

## 2024-10-29 VITALS
OXYGEN SATURATION: 98 % | HEART RATE: 71 BPM | WEIGHT: 245.4 LBS | TEMPERATURE: 97.2 F | BODY MASS INDEX: 35.21 KG/M2 | DIASTOLIC BLOOD PRESSURE: 80 MMHG | SYSTOLIC BLOOD PRESSURE: 150 MMHG

## 2024-10-29 DIAGNOSIS — G47.33 OSA (OBSTRUCTIVE SLEEP APNEA): ICD-10-CM

## 2024-10-29 DIAGNOSIS — F31.9 BIPOLAR AFFECTIVE DISORDER, REMISSION STATUS UNSPECIFIED (HCC): ICD-10-CM

## 2024-10-29 DIAGNOSIS — Z23 NEED FOR IMMUNIZATION AGAINST INFLUENZA: ICD-10-CM

## 2024-10-29 DIAGNOSIS — N40.1 BENIGN PROSTATIC HYPERPLASIA WITH NOCTURIA: ICD-10-CM

## 2024-10-29 DIAGNOSIS — I10 PRIMARY HYPERTENSION: Primary | ICD-10-CM

## 2024-10-29 DIAGNOSIS — R35.1 BENIGN PROSTATIC HYPERPLASIA WITH NOCTURIA: ICD-10-CM

## 2024-10-29 DIAGNOSIS — I10 UNCONTROLLED HYPERTENSION: ICD-10-CM

## 2024-10-29 DIAGNOSIS — K21.9 GASTROESOPHAGEAL REFLUX DISEASE WITHOUT ESOPHAGITIS: ICD-10-CM

## 2024-10-29 DIAGNOSIS — E23.2 DIABETES INSIPIDUS (HCC): ICD-10-CM

## 2024-10-29 RX ORDER — AMILORIDE HYDROCHLORIDE 5 MG/1
5 TABLET ORAL DAILY
Qty: 90 TABLET | Refills: 0 | Status: SHIPPED | OUTPATIENT
Start: 2024-10-29

## 2024-10-29 RX ORDER — TAMSULOSIN HYDROCHLORIDE 0.4 MG/1
0.8 CAPSULE ORAL DAILY
Qty: 180 CAPSULE | Refills: 1 | Status: SHIPPED | OUTPATIENT
Start: 2024-10-29

## 2024-10-29 ASSESSMENT — ENCOUNTER SYMPTOMS
CHOKING: 0
VOMITING: 0
CONSTIPATION: 0
DIARRHEA: 0
CHEST TIGHTNESS: 0
ABDOMINAL PAIN: 0
ANAL BLEEDING: 0
BLOOD IN STOOL: 0
WHEEZING: 0
SHORTNESS OF BREATH: 0
NAUSEA: 0
COUGH: 0

## 2024-10-29 ASSESSMENT — VISUAL ACUITY: OU: 1

## 2024-10-29 NOTE — PROGRESS NOTES
TAKE ONE TABLET BY MOUTH ONCE NIGHTLY Yes Fara Ca APRN - NP   hydrOXYzine HCl (ATARAX) 50 MG tablet TAKE ONE TABLET BY MOUTH ONCE NIGHTLY Yes Fara Ca APRN - NP   vitamin D (D3 5000) 125 MCG (5000 UT) CAPS capsule Take 1 capsule by mouth daily Yes Carrie Olson MD   buPROPion (WELLBUTRIN XL) 150 MG extended release tablet Take 1 tablet by mouth daily Yes Carrie Olson MD   pregabalin (LYRICA) 150 MG capsule Take 1 capsule by mouth in the morning and at bedtime. Yes Carrie Olson MD   celecoxib (CELEBREX) 100 MG capsule Take 2 capsules by mouth daily Yes Carrie Olson MD   chlorhexidine (PERIDEX) 0.12 % solution  Yes Carrie Olson MD   acetaminophen (TYLENOL) 500 MG tablet Take 2 tablets by mouth at bedtime Yes Carrie Olson MD   OXcarbazepine (TRILEPTAL) 600 MG tablet oxcarbazepine 600 mg tablet Yes Carrie Olson MD   buPROPion (WELLBUTRIN XL) 300 MG extended release tablet bupropion HCl  mg 24 hr tablet, extended release Yes Carrie Olson MD   FLUoxetine (PROZAC) 40 MG capsule 20 mg Yes Carrie Olson MD       Review of Systems     Review of Systems   Constitutional:  Negative for fatigue, fever and unexpected weight change.   Respiratory:  Negative for cough, choking, chest tightness, shortness of breath and wheezing.    Cardiovascular:  Negative for chest pain, palpitations and leg swelling.   Gastrointestinal:  Negative for abdominal pain, anal bleeding, blood in stool, constipation, diarrhea, nausea and vomiting.   Endocrine: Negative.    Genitourinary:  Positive for frequency.   Musculoskeletal:  Negative for joint swelling and myalgias.   Skin: Negative.    Neurological:  Negative for dizziness.   Psychiatric/Behavioral:  Negative for sleep disturbance.    All other systems reviewed and are negative.      Objective     BP (!) 156/82 (Site: Left Upper Arm, Position: Sitting, Cuff Size: Large Adult)   Pulse 71   Temp

## 2024-11-01 ENCOUNTER — HOSPITAL ENCOUNTER (OUTPATIENT)
Dept: PULMONOLOGY | Age: 54
Discharge: HOME | End: 2024-11-01
Payer: MEDICARE

## 2024-11-01 DIAGNOSIS — R06.02 SHORTNESS OF BREATH: ICD-10-CM

## 2024-11-01 PROCEDURE — 94010 BREATHING CAPACITY TEST: CPT

## 2024-11-01 PROCEDURE — 94060 EVALUATION OF WHEEZING: CPT

## 2024-11-01 PROCEDURE — 94664 DEMO&/EVAL PT USE INHALER: CPT

## 2024-11-01 PROCEDURE — 94640 AIRWAY INHALATION TREATMENT: CPT

## 2024-12-17 ENCOUNTER — OFFICE VISIT (OUTPATIENT)
Dept: FAMILY MEDICINE CLINIC | Age: 54
End: 2024-12-17

## 2024-12-17 VITALS
HEART RATE: 87 BPM | OXYGEN SATURATION: 98 % | BODY MASS INDEX: 36.03 KG/M2 | SYSTOLIC BLOOD PRESSURE: 130 MMHG | DIASTOLIC BLOOD PRESSURE: 82 MMHG | TEMPERATURE: 97.2 F | WEIGHT: 244 LBS

## 2024-12-17 DIAGNOSIS — J06.9 UPPER RESPIRATORY INFECTION WITH COUGH AND CONGESTION: Primary | ICD-10-CM

## 2024-12-17 DIAGNOSIS — A08.4 VIRAL GASTROENTERITIS: ICD-10-CM

## 2024-12-17 RX ORDER — ALBUTEROL SULFATE 90 UG/1
2 INHALANT RESPIRATORY (INHALATION) 4 TIMES DAILY PRN
Qty: 18 G | Refills: 0 | Status: SHIPPED | OUTPATIENT
Start: 2024-12-17

## 2024-12-17 RX ORDER — BENZONATATE 100 MG/1
100 CAPSULE ORAL EVERY 6 HOURS PRN
Qty: 30 CAPSULE | Refills: 0 | Status: SHIPPED | OUTPATIENT
Start: 2024-12-17 | End: 2024-12-27

## 2024-12-17 ASSESSMENT — ENCOUNTER SYMPTOMS
COUGH: 1
SORE THROAT: 1
DIARRHEA: 1
SHORTNESS OF BREATH: 1

## 2024-12-17 NOTE — PROGRESS NOTES
Colten Browne (:  1970) is a 54 y.o. male,Established patient, here for evaluation of the following chief complaint(s):  Nasal Congestion, Congestion, Cough, Diarrhea, Chest Pain, Shortness of Breath, Cold Symptoms (Pt. States been going on about five days /), and Lower Back Pain         Assessment & Plan  Upper respiratory infection with cough and congestion   1 week of congestion, cough, shortness of breath and diarrhea happened after fiance also had upper respiratory infection, per patient feels mildly short of breath and worsening congestion   PE: mild wheezing on exam otherwise no respiratory distress saturating at 98% oxygen with good air movement  Discussed symptomatic treatment, patient has been taking his fiance steroids and z pack, discussed can stop taking z pack and continue steroids, was given prednisone  Will prescribe albuterol and tesslon perles  Discussed warm liquids and fluid along with humidifier  Discussed with patient red flag symptoms and if worsening shortness of breath to go to the hospital             Viral gastroenteritis   Diarrhea after a cruise  No red flag symptoms of tenderness or pain  Discussed BRAT diet   Discussed staying hydrated with Pedialyte or Gatorade  Discussed red flag symptoms and to follow back up            Return for needs nursing visit in one month for blood pressure check .       Subjective   54 yr old here due to concerns for upper respiratory infection and diarrhea, per patient had come back from a cruise, and had stated that originally his fiance had had symptoms of upper respiratory infection in which she had difficulty breathing and was admitted to the hospital due to low oxygen saturation. Per patient his symptoms started a week ago last  symptoms started, with congestion, sore throat and shortness of breath, coughing worse at night   Diarrhea, no fever but chills last night. Denies any respiratory distress no chest pain, no headache,

## 2024-12-27 ENCOUNTER — NURSE ONLY (OUTPATIENT)
Dept: FAMILY MEDICINE CLINIC | Age: 54
End: 2024-12-27
Payer: MEDICARE

## 2024-12-27 VITALS — SYSTOLIC BLOOD PRESSURE: 158 MMHG | OXYGEN SATURATION: 97 % | HEART RATE: 87 BPM | DIASTOLIC BLOOD PRESSURE: 94 MMHG

## 2024-12-27 DIAGNOSIS — I10 PRIMARY HYPERTENSION: Primary | ICD-10-CM

## 2024-12-27 PROCEDURE — 99211 OFF/OP EST MAY X REQ PHY/QHP: CPT | Performed by: NURSE PRACTITIONER

## 2024-12-27 RX ORDER — OXCARBAZEPINE 300 MG/1
900 TABLET, FILM COATED ORAL EVERY MORNING
COMMUNITY
Start: 2024-12-10

## 2024-12-27 NOTE — PROGRESS NOTES
Pt is here today for a BP check. Vitals are as follows.   SEE VITAL TAB  Pt denies CP, HA or SOB.    PATIENT IS HAVING BACK PAIN, RT SIDE  PAIN LEVEL IS 8 AND AT NIGHT IS 10

## 2025-01-21 DIAGNOSIS — F51.01 PRIMARY INSOMNIA: ICD-10-CM

## 2025-01-21 DIAGNOSIS — G25.81 RESTLESS LEG: ICD-10-CM

## 2025-01-21 RX ORDER — HYDROXYZINE HYDROCHLORIDE 50 MG/1
50 TABLET, FILM COATED ORAL NIGHTLY
Qty: 90 TABLET | Refills: 1 | Status: SHIPPED | OUTPATIENT
Start: 2025-01-21

## 2025-01-21 RX ORDER — PRAMIPEXOLE DIHYDROCHLORIDE 0.25 MG/1
0.25 TABLET ORAL NIGHTLY
Qty: 90 TABLET | Refills: 1 | Status: SHIPPED | OUTPATIENT
Start: 2025-01-21

## 2025-01-21 NOTE — TELEPHONE ENCOUNTER
Last visit: 10/29/2024  Last Med refill: 07/24/2024  Does patient have enough medication for 72 hours: Yes    Next Visit Date:  Future Appointments   Date Time Provider Department Center   3/3/2025 12:00 PM Joann Simmons MD [facility] DEP       Health Maintenance   Topic Date Due    Annual Wellness Visit (Medicare Advantage)  01/01/2025    Hepatitis B vaccine (1 of 3 - 19+ 3-dose series) 08/13/2025 (Originally 10/14/1989)    COVID-19 Vaccine (3 - 2023-24 season) 09/24/2025 (Originally 9/1/2024)    Depression Monitoring  04/29/2025    Colorectal Cancer Screen  06/11/2025    Lipids  08/29/2025    Diabetes screen  09/10/2027    DTaP/Tdap/Td vaccine (2 - Td or Tdap) 10/01/2030    Flu vaccine  Completed    Shingles vaccine  Completed    Hepatitis C screen  Completed    HIV screen  Completed    Hepatitis A vaccine  Aged Out    Hib vaccine  Aged Out    Polio vaccine  Aged Out    Meningococcal (ACWY) vaccine  Aged Out    Pneumococcal 0-64 years Vaccine  Aged Out    Depression Screen  Discontinued       Hemoglobin A1C (%)   Date Value   09/10/2024 5.4   02/08/2024 5.3   01/21/2022 5.0             ( goal A1C is < 7)   No components found for: \"LABMICR\"  No components found for: \"LDLCHOLESTEROL\", \"LDLCALC\"    (goal LDL is <100)   AST (U/L)   Date Value   08/29/2024 36     ALT (U/L)   Date Value   08/29/2024 38     BUN (mg/dL)   Date Value   08/29/2024 13     BP Readings from Last 3 Encounters:   01/17/25 (!) 135/90   12/27/24 (!) 158/94   12/17/24 130/82          (goal 120/80)    All Future Testing planned in [facility]  Lab Frequency Next Occurrence               Patient Active Problem List:     Bipolar disorder (HCC)     HTN (hypertension)     Change in bowel habits     Diverticulosis     Internal hemorrhoid     Mixed hyperlipidemia     Bipolar disorder (HCC)     Diabetes insipidus (HCC)     Spinal stenosis of lumbar region     Primary insomnia     History of renal cell carcinoma     Scalp cyst     Restless

## 2025-01-21 NOTE — TELEPHONE ENCOUNTER
leg     Gastroesophageal reflux disease without esophagitis     SCOT (obstructive sleep apnea)

## 2025-03-02 SDOH — ECONOMIC STABILITY: INCOME INSECURITY: IN THE LAST 12 MONTHS, WAS THERE A TIME WHEN YOU WERE NOT ABLE TO PAY THE MORTGAGE OR RENT ON TIME?: NO

## 2025-03-02 SDOH — ECONOMIC STABILITY: FOOD INSECURITY: WITHIN THE PAST 12 MONTHS, THE FOOD YOU BOUGHT JUST DIDN'T LAST AND YOU DIDN'T HAVE MONEY TO GET MORE.: NEVER TRUE

## 2025-03-02 SDOH — ECONOMIC STABILITY: FOOD INSECURITY: WITHIN THE PAST 12 MONTHS, YOU WORRIED THAT YOUR FOOD WOULD RUN OUT BEFORE YOU GOT MONEY TO BUY MORE.: SOMETIMES TRUE

## 2025-03-02 ASSESSMENT — PATIENT HEALTH QUESTIONNAIRE - PHQ9
7. TROUBLE CONCENTRATING ON THINGS, SUCH AS READING THE NEWSPAPER OR WATCHING TELEVISION: MORE THAN HALF THE DAYS
4. FEELING TIRED OR HAVING LITTLE ENERGY: NEARLY EVERY DAY
2. FEELING DOWN, DEPRESSED OR HOPELESS: SEVERAL DAYS
8. MOVING OR SPEAKING SO SLOWLY THAT OTHER PEOPLE COULD HAVE NOTICED. OR THE OPPOSITE, BEING SO FIGETY OR RESTLESS THAT YOU HAVE BEEN MOVING AROUND A LOT MORE THAN USUAL: SEVERAL DAYS
SUM OF ALL RESPONSES TO PHQ QUESTIONS 1-9: 17
3. TROUBLE FALLING OR STAYING ASLEEP: NEARLY EVERY DAY
7. TROUBLE CONCENTRATING ON THINGS, SUCH AS READING THE NEWSPAPER OR WATCHING TELEVISION: MORE THAN HALF THE DAYS
6. FEELING BAD ABOUT YOURSELF - OR THAT YOU ARE A FAILURE OR HAVE LET YOURSELF OR YOUR FAMILY DOWN: SEVERAL DAYS
5. POOR APPETITE OR OVEREATING: NEARLY EVERY DAY
10. IF YOU CHECKED OFF ANY PROBLEMS, HOW DIFFICULT HAVE THESE PROBLEMS MADE IT FOR YOU TO DO YOUR WORK, TAKE CARE OF THINGS AT HOME, OR GET ALONG WITH OTHER PEOPLE: SOMEWHAT DIFFICULT
2. FEELING DOWN, DEPRESSED OR HOPELESS: SEVERAL DAYS
8. MOVING OR SPEAKING SO SLOWLY THAT OTHER PEOPLE COULD HAVE NOTICED. OR THE OPPOSITE - BEING SO FIDGETY OR RESTLESS THAT YOU HAVE BEEN MOVING AROUND A LOT MORE THAN USUAL: SEVERAL DAYS
9. THOUGHTS THAT YOU WOULD BE BETTER OFF DEAD, OR OF HURTING YOURSELF: NOT AT ALL
3. TROUBLE FALLING OR STAYING ASLEEP: NEARLY EVERY DAY
10. IF YOU CHECKED OFF ANY PROBLEMS, HOW DIFFICULT HAVE THESE PROBLEMS MADE IT FOR YOU TO DO YOUR WORK, TAKE CARE OF THINGS AT HOME, OR GET ALONG WITH OTHER PEOPLE: SOMEWHAT DIFFICULT
5. POOR APPETITE OR OVEREATING: NEARLY EVERY DAY
SUM OF ALL RESPONSES TO PHQ QUESTIONS 1-9: 17
9. THOUGHTS THAT YOU WOULD BE BETTER OFF DEAD, OR OF HURTING YOURSELF: NOT AT ALL
SUM OF ALL RESPONSES TO PHQ QUESTIONS 1-9: 17
1. LITTLE INTEREST OR PLEASURE IN DOING THINGS: NEARLY EVERY DAY
1. LITTLE INTEREST OR PLEASURE IN DOING THINGS: NEARLY EVERY DAY
6. FEELING BAD ABOUT YOURSELF - OR THAT YOU ARE A FAILURE OR HAVE LET YOURSELF OR YOUR FAMILY DOWN: SEVERAL DAYS
4. FEELING TIRED OR HAVING LITTLE ENERGY: NEARLY EVERY DAY

## 2025-03-03 ENCOUNTER — OFFICE VISIT (OUTPATIENT)
Dept: FAMILY MEDICINE CLINIC | Age: 55
End: 2025-03-03
Payer: MEDICARE

## 2025-03-03 VITALS
DIASTOLIC BLOOD PRESSURE: 86 MMHG | BODY MASS INDEX: 33.33 KG/M2 | HEIGHT: 69 IN | RESPIRATION RATE: 20 BRPM | WEIGHT: 225 LBS | SYSTOLIC BLOOD PRESSURE: 128 MMHG | HEART RATE: 73 BPM | OXYGEN SATURATION: 94 %

## 2025-03-03 DIAGNOSIS — F31.9 BIPOLAR AFFECTIVE DISORDER, REMISSION STATUS UNSPECIFIED (HCC): ICD-10-CM

## 2025-03-03 DIAGNOSIS — E23.2 DIABETES INSIPIDUS: ICD-10-CM

## 2025-03-03 DIAGNOSIS — K52.9 CHRONIC DIARRHEA OF UNKNOWN ORIGIN: ICD-10-CM

## 2025-03-03 DIAGNOSIS — Z00.00 MEDICARE ANNUAL WELLNESS VISIT, SUBSEQUENT: Primary | ICD-10-CM

## 2025-03-03 DIAGNOSIS — Z71.89 ACP (ADVANCE CARE PLANNING): ICD-10-CM

## 2025-03-03 DIAGNOSIS — I10 PRIMARY HYPERTENSION: ICD-10-CM

## 2025-03-03 DIAGNOSIS — E66.811 CLASS 1 OBESITY DUE TO EXCESS CALORIES WITH SERIOUS COMORBIDITY AND BODY MASS INDEX (BMI) OF 33.0 TO 33.9 IN ADULT: ICD-10-CM

## 2025-03-03 DIAGNOSIS — Z13.6 SCREENING FOR CARDIOVASCULAR CONDITION: ICD-10-CM

## 2025-03-03 DIAGNOSIS — E66.09 CLASS 1 OBESITY DUE TO EXCESS CALORIES WITH SERIOUS COMORBIDITY AND BODY MASS INDEX (BMI) OF 33.0 TO 33.9 IN ADULT: ICD-10-CM

## 2025-03-03 DIAGNOSIS — Z87.891 PERSONAL HISTORY OF TOBACCO USE: ICD-10-CM

## 2025-03-03 DIAGNOSIS — K21.9 GASTROESOPHAGEAL REFLUX DISEASE WITHOUT ESOPHAGITIS: ICD-10-CM

## 2025-03-03 DIAGNOSIS — J44.9 CHRONIC OBSTRUCTIVE PULMONARY DISEASE, UNSPECIFIED COPD TYPE (HCC): ICD-10-CM

## 2025-03-03 PROCEDURE — G0446 INTENS BEHAVE THER CARDIO DX: HCPCS | Performed by: INTERNAL MEDICINE

## 2025-03-03 PROCEDURE — 3079F DIAST BP 80-89 MM HG: CPT | Performed by: INTERNAL MEDICINE

## 2025-03-03 PROCEDURE — G0439 PPPS, SUBSEQ VISIT: HCPCS | Performed by: INTERNAL MEDICINE

## 2025-03-03 PROCEDURE — G0296 VISIT TO DETERM LDCT ELIG: HCPCS | Performed by: INTERNAL MEDICINE

## 2025-03-03 PROCEDURE — 3074F SYST BP LT 130 MM HG: CPT | Performed by: INTERNAL MEDICINE

## 2025-03-03 PROCEDURE — 99497 ADVNCD CARE PLAN 30 MIN: CPT | Performed by: INTERNAL MEDICINE

## 2025-03-03 PROCEDURE — G0447 BEHAVIOR COUNSEL OBESITY 15M: HCPCS | Performed by: INTERNAL MEDICINE

## 2025-03-03 PROCEDURE — 99214 OFFICE O/P EST MOD 30 MIN: CPT | Performed by: INTERNAL MEDICINE

## 2025-03-03 RX ORDER — DICYCLOMINE HYDROCHLORIDE 10 MG/1
10 CAPSULE ORAL
Qty: 120 CAPSULE | Refills: 0 | Status: SHIPPED | OUTPATIENT
Start: 2025-03-03

## 2025-03-03 ASSESSMENT — LIFESTYLE VARIABLES
HOW MANY STANDARD DRINKS CONTAINING ALCOHOL DO YOU HAVE ON A TYPICAL DAY: PATIENT DOES NOT DRINK
HOW OFTEN DO YOU HAVE A DRINK CONTAINING ALCOHOL: NEVER

## 2025-03-03 NOTE — PROGRESS NOTES
Medicare Annual Wellness Visit    Colten Browne is here for Hypertension (4 month follow up), Health Maintenance (SDOH completed via [employer] /), and Medicare AWV    Assessment & Plan   Medicare annual wellness visit, subsequent  Chronic obstructive pulmonary disease, unspecified COPD type (HCC)  -     tiotropium-olodaterol (STIOLTO RESPIMAT) 2.5-2.5 MCG/ACT AERS; Inhale 2 puffs into the lungs daily, Disp-1 each, R-1Normal  Bipolar affective disorder, remission status unspecified (HCC)  Chronic diarrhea of unknown origin  -     dicyclomine (BENTYL) 10 MG capsule; Take 1 capsule by mouth 4 times daily (before meals and nightly), Disp-120 capsule, R-0Normal  -     Celiac Disease Panel; Future  Class 1 obesity due to excess calories with serious comorbidity and body mass index (BMI) of 33.0 to 33.9 in adult  -     [place] Behavior  obesity (8-15 min) []  Gastroesophageal reflux disease without esophagitis  Primary hypertension  Diabetes insipidus  ACP (advance care planning)  -     [place] Advanced Care Planning (16-30 minutes) [73390]  Screening for cardiovascular condition  -     [place] Intensive Behavior Counseling for Cardiovascular Diseases, 8-15 minutes []  Personal history of tobacco use  -     [place] VISIT TO DISCUSS LUNG CA SCREEN W LDCT  -     CT Lung Screen (Initial/Annual/Baseline); Future       No follow-ups on file.     Subjective   The following acute and/or chronic problems were also addressed today:  Hypertension-tolerating current regimen without chest pain, palpitations, dizziness, peripheral edema, dyspnea on exertion, orthopnea, paroxysmal nocturnal dyspnea.  Hyperlipidemia-tolerating current regimen without myalgias, dyspepsia, jaundice.  Having hot flashes - feels like he is burning up most day perla when indoors, feels good when goes outside  Wants to go over breathing tests - has dyspnea on exertion, has albuterol but has been afraid of using inhalers   Has to go to the bathroom as soon as he

## 2025-03-03 NOTE — PATIENT INSTRUCTIONS
your heart healthy and prevent heart disease. This lifestyle includes eating healthy, being active, staying at a weight that's healthy for you, and not smoking or using tobacco. It also includes taking medicines as directed, managing other health conditions, and trying to get a healthy amount of sleep.  Follow-up care is a key part of your treatment and safety. Be sure to make and go to all appointments, and call your doctor if you are having problems. It's also a good idea to know your test results and keep a list of the medicines you take.  How can you care for yourself at home?  Diet    Use less salt when you cook and eat. This helps lower your blood pressure. Taste food before salting. Add only a little salt when you think you need it. With time, your taste buds will adjust to less salt.     Eat fewer snack items, fast foods, canned soups, and other high-salt, high-fat, processed foods.     Read food labels and try to avoid saturated and trans fats. They increase your risk of heart disease by raising cholesterol levels.     Limit the amount of solid fat--butter, margarine, and shortening--you eat. Use olive, peanut, or canola oil when you cook. Bake, broil, and steam foods instead of frying them.     Eat a variety of fruit and vegetables every day. Dark green, deep orange, red, or yellow fruits and vegetables are especially good for you. Examples include spinach, carrots, peaches, and berries.     Foods high in fiber can reduce your cholesterol and provide important vitamins and minerals. High-fiber foods include whole-grain cereals and breads, oatmeal, beans, brown rice, citrus fruits, and apples.     Eat lean proteins. Heart-healthy proteins include seafood, lean meats and poultry, eggs, beans, peas, nuts, seeds, and soy products.     Limit drinks and foods with added sugar. These include candy, desserts, and soda pop.   Heart-healthy lifestyle    If your doctor recommends it, get more exercise. For many

## 2025-03-06 DIAGNOSIS — I10 PRIMARY HYPERTENSION: ICD-10-CM

## 2025-03-06 DIAGNOSIS — K21.9 GASTROESOPHAGEAL REFLUX DISEASE WITHOUT ESOPHAGITIS: ICD-10-CM

## 2025-03-06 DIAGNOSIS — E78.5 DYSLIPIDEMIA: ICD-10-CM

## 2025-03-06 RX ORDER — ATORVASTATIN CALCIUM 20 MG/1
20 TABLET, FILM COATED ORAL DAILY
Qty: 90 TABLET | Refills: 1 | Status: SHIPPED | OUTPATIENT
Start: 2025-03-06

## 2025-03-06 RX ORDER — PANTOPRAZOLE SODIUM 20 MG/1
TABLET, DELAYED RELEASE ORAL
Qty: 90 TABLET | Refills: 1 | Status: SHIPPED | OUTPATIENT
Start: 2025-03-06

## 2025-03-06 RX ORDER — FUROSEMIDE 20 MG/1
20 TABLET ORAL DAILY
Qty: 90 TABLET | Refills: 1 | Status: SHIPPED | OUTPATIENT
Start: 2025-03-06

## 2025-03-06 NOTE — TELEPHONE ENCOUNTER
Last visit: 03/03/2025  Last Med refill: 12/02/2024  Does patient have enough medication for 72 hours: No:     Next Visit Date:  Future Appointments   Date Time Provider Department Center   7/14/2025  1:00 PM Joann Simmons MD [facility] E[facility] DEP       Health Maintenance   Topic Date Due    Lung Cancer Screening &/or Counseling  10/14/2020    Pneumococcal 50+ years Vaccine (2 of 2 - PCV) 10/28/2021    Hepatitis B vaccine (1 of 3 - 19+ 3-dose series) 08/13/2025 (Originally 10[birth year]14/1989)    COVID-19 Vaccine (3 - 2024-25 season) 09/24/2025 (Originally 9/1/2024)    Colorectal Cancer Screen  06/11/2025    Lipids  08/29/2025    Depression Monitoring  03/02/2026    Diabetes screen  09/10/2027    DTaP/Tdap/Td vaccine (2 - Td or Tdap) 10/01/2030    Annual Wellness Visit (Medicare Advantage)  Completed    Flu vaccine  Completed    Shingles vaccine  Completed    Hepatitis C screen  Completed    HIV screen  Completed    Hepatitis A vaccine  Aged Out    Hib vaccine  Aged Out    Polio vaccine  Aged Out    Meningococcal (ACWY) vaccine  Aged Out    Depression Screen  Discontinued    Pneumococcal 0-49 years Vaccine  Discontinued       Hemoglobin A1C (%)   Date Value   09/10/2024 5.4   02/08/2024 5.3   01/21/2022 5.0             ( goal A1C is < 7)   No components found for: \"LABMICR\"  No components found for: \"LDLCHOLESTEROL\", \"LDLCALC\"    (goal LDL is <100)   AST (U/L)   Date Value   08/29/2024 36     ALT (U/L)   Date Value   08/29/2024 38     BUN (mg/dL)   Date Value   08/29/2024 13     BP Readings from Last 3 Encounters:   03/03/25 128/86   01/17/25 (!) 135/90   12/27/24 (!) 158/94          (goal 120/80)    All Future Testing planned in [facility]  Lab Frequency Next Occurrence   CT Lung Screen (Initial/Annual/Baseline) Once 03/03/2025   Celiac Disease Panel Once 03/03/2025               Patient Active Problem List:     Bipolar disorder (HCC)     HTN (hypertension)     Change in bowel habits     Diverticulosis     Internal

## 2025-03-26 ENCOUNTER — HOSPITAL ENCOUNTER (OUTPATIENT)
Dept: CT IMAGING | Facility: CLINIC | Age: 55
Discharge: HOME | End: 2025-03-28
Payer: MEDICARE

## 2025-03-26 VITALS — BODY MASS INDEX: 32.21 KG/M2 | WEIGHT: 225 LBS | HEIGHT: 70 IN

## 2025-03-26 DIAGNOSIS — Z87.891 PERSONAL HISTORY OF TOBACCO USE: ICD-10-CM

## 2025-03-26 PROCEDURE — 71271 CT THORAX LUNG CANCER SCR C-: CPT

## 2025-04-26 DIAGNOSIS — I10 PRIMARY HYPERTENSION: ICD-10-CM

## 2025-04-28 NOTE — TELEPHONE ENCOUNTER
Last visit: 03/03/2025  Last Med refill: 01/22/2025  Does patient have enough medication for 72 hours: No:     Next Visit Date:  Future Appointments   Date Time Provider Department Center   7/14/2025  1:00 PM Joann [facility] MD [facility] DEP       Health Maintenance   Topic Date Due    Pneumococcal 50+ years Vaccine (2 of 2 - PCV) 10/28/2021    Hepatitis B vaccine (1 of 3 - 19+ 3-dose series) 08/13/2025 (Originally 10[birth year]14/1989)    COVID-19 Vaccine (3 - 2024-25 season) 09/24/2025 (Originally 9/1/2024)    Colorectal Cancer Screen  06/11/2025    Lipids  08/29/2025    Depression Monitoring  03/02/2026    Lung Cancer Screening &/or Counseling  03/26/2026    Diabetes screen  09/10/2027    DTaP/Tdap/Td vaccine (2 - Td or Tdap) 10/01/2030    Annual Wellness Visit (Medicare Advantage)  Completed    Flu vaccine  Completed    Shingles vaccine  Completed    Hepatitis C screen  Completed    HIV screen  Completed    Hepatitis A vaccine  Aged Out    Hib vaccine  Aged Out    Polio vaccine  Aged Out    Meningococcal (ACWY) vaccine  Aged Out    Meningococcal B vaccine  Aged Out    Depression Screen  Discontinued    Pneumococcal 0-49 years Vaccine  Discontinued       Hemoglobin A1C (%)   Date Value   09/10/2024 5.4   02/08/2024 5.3   01/21/2022 5.0             ( goal A1C is < 7)   No components found for: \"LABMICR\"  No components found for: \"LDLCHOLESTEROL\", \"LDLCALC\"    (goal LDL is <100)   AST (U/L)   Date Value   08/29/2024 36     ALT (U/L)   Date Value   08/29/2024 38     BUN (mg/dL)   Date Value   08/29/2024 13     BP Readings from Last 3 Encounters:   03/03/25 128/86   01/17/25 (!) 135/90   12/27/24 (!) 158/94          (goal 120/80)    All Future Testing planned in [facility]  Lab Frequency Next Occurrence   Celiac Disease Panel Once 03/03/2025               Patient Active Problem List:     Bipolar disorder (HCC)     HTN (hypertension)     Change in bowel habits     Diverticulosis     Internal hemorrhoid     Mixed

## 2025-04-29 DIAGNOSIS — K52.9 CHRONIC DIARRHEA OF UNKNOWN ORIGIN: ICD-10-CM

## 2025-04-29 RX ORDER — METOPROLOL SUCCINATE 50 MG/1
50 TABLET, EXTENDED RELEASE ORAL DAILY
Qty: 90 TABLET | Refills: 1 | Status: SHIPPED | OUTPATIENT
Start: 2025-04-29

## 2025-04-29 NOTE — TELEPHONE ENCOUNTER
Last visit: 3/3/25  Last Med refill: 3/3/25  Does patient have enough medication for 72 hours: No:     Next Visit Date:  Future Appointments   Date Time Provider Department Center   7/14/2025  1:00 PM Joann Simmons MD [facility] DEP       Health Maintenance   Topic Date Due    Pneumococcal 50+ years Vaccine (2 of 2 - PCV) 10/28/2021    Hepatitis B vaccine (1 of 3 - 19+ 3-dose series) 08/13/2025 (Originally 10[birth year]14/1989)    COVID-19 Vaccine (3 - 2024-25 season) 09/24/2025 (Originally 9/1/2024)    Colorectal Cancer Screen  06/11/2025    Lipids  08/29/2025    Depression Monitoring  03/02/2026    Lung Cancer Screening &/or Counseling  03/26/2026    Diabetes screen  09/10/2027    DTaP/Tdap/Td vaccine (2 - Td or Tdap) 10/01/2030    Annual Wellness Visit (Medicare Advantage)  Completed    Flu vaccine  Completed    Shingles vaccine  Completed    Hepatitis C screen  Completed    HIV screen  Completed    Hepatitis A vaccine  Aged Out    Hib vaccine  Aged Out    Polio vaccine  Aged Out    Meningococcal (ACWY) vaccine  Aged Out    Meningococcal B vaccine  Aged Out    Depression Screen  Discontinued    Pneumococcal 0-49 years Vaccine  Discontinued       Hemoglobin A1C (%)   Date Value   09/10/2024 5.4   02/08/2024 5.3   01/21/2022 5.0             ( goal A1C is < 7)   No components found for: \"LABMICR\"  No components found for: \"LDLCHOLESTEROL\", \"LDLCALC\"    (goal LDL is <100)   AST (U/L)   Date Value   08/29/2024 36     ALT (U/L)   Date Value   08/29/2024 38     BUN (mg/dL)   Date Value   08/29/2024 13     BP Readings from Last 3 Encounters:   03/03/25 128/86   01/17/25 (!) 135/90   12/27/24 (!) 158/94          (goal 120/80)    All Future Testing planned in [facility]  Lab Frequency Next Occurrence   Celiac Disease Panel Once 03/03/2025               Patient Active Problem List:     Bipolar disorder (HCC)     HTN (hypertension)     Change in bowel habits     Diverticulosis     Internal hemorrhoid     Mixed

## 2025-04-30 DIAGNOSIS — J44.9 CHRONIC OBSTRUCTIVE PULMONARY DISEASE, UNSPECIFIED COPD TYPE (HCC): ICD-10-CM

## 2025-04-30 RX ORDER — DICYCLOMINE HYDROCHLORIDE 10 MG/1
CAPSULE ORAL
Qty: 120 CAPSULE | Refills: 0 | Status: SHIPPED | OUTPATIENT
Start: 2025-04-30

## 2025-04-30 NOTE — TELEPHONE ENCOUNTER
Last visit: 03/03/2025  Last Med refill: 03/03/2025  Does patient have enough medication for 72 hours: Yes    Next Visit Date:  Future Appointments   Date Time Provider Department Center   7/14/2025  1:00 PM Joann Simmons MD [facility] DEP       Health Maintenance   Topic Date Due    Pneumococcal 50+ years Vaccine (2 of 2 - PCV) 10/28/2021    Hepatitis B vaccine (1 of 3 - 19+ 3-dose series) 08/13/2025 (Originally [birth year]1989)    COVID-19 Vaccine (3 - 2024-25 season) 09/24/2025 (Originally 9/1/2024)    Colorectal Cancer Screen  06/11/2025    Lipids  08/29/2025    Depression Monitoring  03/02/2026    Lung Cancer Screening &/or Counseling  03/26/2026    Diabetes screen  09/10/2027    DTaP/Tdap/Td vaccine (2 - Td or Tdap) 10/01/2030    Annual Wellness Visit (Medicare Advantage)  Completed    Flu vaccine  Completed    Shingles vaccine  Completed    Hepatitis C screen  Completed    HIV screen  Completed    Hepatitis A vaccine  Aged Out    Hib vaccine  Aged Out    Polio vaccine  Aged Out    Meningococcal (ACWY) vaccine  Aged Out    Meningococcal B vaccine  Aged Out    Depression Screen  Discontinued    Pneumococcal 0-49 years Vaccine  Discontinued       Hemoglobin A1C (%)   Date Value   09/10/2024 5.4   02/08/2024 5.3   01/21/2022 5.0             ( goal A1C is < 7)   No components found for: \"LABMICR\"  No components found for: \"LDLCHOLESTEROL\", \"LDLCALC\"    (goal LDL is <100)   AST (U/L)   Date Value   08/29/2024 36     ALT (U/L)   Date Value   08/29/2024 38     BUN (mg/dL)   Date Value   08/29/2024 13     BP Readings from Last 3 Encounters:   03/03/25 128/86   01/17/25 (!) 135/90   12/27/24 (!) 158/94          (goal 120/80)    All Future Testing planned in [facility]  Lab Frequency Next Occurrence   Celiac Disease Panel Once 03/03/2025               Patient Active Problem List:     Bipolar disorder (HCC)     HTN (hypertension)     Change in bowel habits     Diverticulosis     Internal hemorrhoid     Mixed

## 2025-05-07 DIAGNOSIS — R35.1 BENIGN PROSTATIC HYPERPLASIA WITH NOCTURIA: ICD-10-CM

## 2025-05-07 DIAGNOSIS — N40.1 BENIGN PROSTATIC HYPERPLASIA WITH NOCTURIA: ICD-10-CM

## 2025-05-07 RX ORDER — TAMSULOSIN HYDROCHLORIDE 0.4 MG/1
0.8 CAPSULE ORAL DAILY
Qty: 180 CAPSULE | Refills: 1 | Status: SHIPPED | OUTPATIENT
Start: 2025-05-07

## 2025-05-07 NOTE — TELEPHONE ENCOUNTER
Last Visit:  3/3/2025     Next Visit Date:  Future Appointments   Date Time Provider Department Center   7/14/2025  1:00 PM Joann Simmons MD [facility] DEP         no

## 2025-05-23 DIAGNOSIS — I10 PRIMARY HYPERTENSION: ICD-10-CM

## 2025-05-23 NOTE — TELEPHONE ENCOUNTER
[facility] [place]  -Lisinop[place] 40mg

## 2025-05-24 RX ORDER — LISINOPRIL 40 MG/1
40 TABLET ORAL DAILY
Qty: 90 TABLET | Refills: 1 | Status: SHIPPED | OUTPATIENT
Start: 2025-05-24

## 2025-07-14 ENCOUNTER — OFFICE VISIT (OUTPATIENT)
Dept: FAMILY MEDICINE CLINIC | Age: 55
End: 2025-07-14
Payer: MEDICARE

## 2025-07-14 VITALS
OXYGEN SATURATION: 97 % | DIASTOLIC BLOOD PRESSURE: 84 MMHG | HEART RATE: 73 BPM | WEIGHT: 224 LBS | BODY MASS INDEX: 32.14 KG/M2 | SYSTOLIC BLOOD PRESSURE: 130 MMHG

## 2025-07-14 DIAGNOSIS — F31.9 BIPOLAR AFFECTIVE DISORDER, REMISSION STATUS UNSPECIFIED (HCC): ICD-10-CM

## 2025-07-14 DIAGNOSIS — K05.10 GINGIVITIS: ICD-10-CM

## 2025-07-14 DIAGNOSIS — R35.0 BENIGN PROSTATIC HYPERPLASIA WITH URINARY FREQUENCY: Primary | ICD-10-CM

## 2025-07-14 DIAGNOSIS — J30.1 ALLERGIC RHINITIS DUE TO POLLEN, UNSPECIFIED SEASONALITY: ICD-10-CM

## 2025-07-14 DIAGNOSIS — G25.81 RESTLESS LEG: ICD-10-CM

## 2025-07-14 DIAGNOSIS — F51.01 PRIMARY INSOMNIA: ICD-10-CM

## 2025-07-14 DIAGNOSIS — N40.1 BENIGN PROSTATIC HYPERPLASIA WITH URINARY FREQUENCY: Primary | ICD-10-CM

## 2025-07-14 PROCEDURE — 99214 OFFICE O/P EST MOD 30 MIN: CPT | Performed by: INTERNAL MEDICINE

## 2025-07-14 PROCEDURE — 3079F DIAST BP 80-89 MM HG: CPT | Performed by: INTERNAL MEDICINE

## 2025-07-14 PROCEDURE — 3075F SYST BP GE 130 - 139MM HG: CPT | Performed by: INTERNAL MEDICINE

## 2025-07-14 RX ORDER — PRAMIPEXOLE DIHYDROCHLORIDE 0.5 MG/1
0.5 TABLET ORAL NIGHTLY
Qty: 90 TABLET | Refills: 1 | Status: SHIPPED | OUTPATIENT
Start: 2025-07-14

## 2025-07-14 RX ORDER — LORATADINE 10 MG/1
10 TABLET ORAL DAILY
Qty: 90 TABLET | Refills: 1 | Status: SHIPPED | OUTPATIENT
Start: 2025-07-14

## 2025-07-14 RX ORDER — HYDROXYZINE HYDROCHLORIDE 50 MG/1
50 TABLET, FILM COATED ORAL NIGHTLY
Qty: 90 TABLET | Refills: 1 | Status: SHIPPED | OUTPATIENT
Start: 2025-07-14

## 2025-07-14 RX ORDER — CHLORHEXIDINE GLUCONATE ORAL RINSE 1.2 MG/ML
15 SOLUTION DENTAL 2 TIMES DAILY
Qty: 2700 ML | Refills: 1 | Status: SHIPPED | OUTPATIENT
Start: 2025-07-14

## 2025-07-14 ASSESSMENT — ENCOUNTER SYMPTOMS
COUGH: 0
CHOKING: 0
WHEEZING: 0
SHORTNESS OF BREATH: 0
ABDOMINAL PAIN: 0
VOMITING: 0
ANAL BLEEDING: 0
BLOOD IN STOOL: 0
NAUSEA: 0
DIARRHEA: 0
CONSTIPATION: 0
CHEST TIGHTNESS: 0

## 2025-07-14 ASSESSMENT — VISUAL ACUITY: OU: 1

## 2025-07-14 NOTE — PATIENT INSTRUCTIONS
Increase your pramipexole to 0.5 mg nightly - take 2 pills daily of current 0.25 mg dose till you run out then start the 0.5 mg pills called in today.

## 2025-07-14 NOTE — PROGRESS NOTES
[facility]  [address] [address]  Dept: [phone]  Dept Fax: [phone]      Colten Browne is a 54 y.o. male who presents today for hismedical conditions/complaints as noted below.  Colten Browne is c/o of Follow-up, Hypertension (F/u), and Medication Check        Assessment/Plan:     1. Benign prostatic hyperplasia with urinary frequency  -     [facility]  2. Primary insomnia  -     hydrOXYzine HCl (ATARAX) 50 MG tablet; Take 1 tablet by mouth nightly, Disp-90 tablet, R-1Normal  3. Allergic rhinitis due to pollen, unspecified seasonality  -     loratadine (CLARITIN) 10 MG tablet; Take 1 tablet by mouth daily, Disp-90 tablet, R-1Normal  4. Restless leg  -     pramipexole (MIRAPEX) 0.5 MG tablet; Take 1 tablet by mouth nightly, Disp-90 tablet, R-1Normal  5. Gingivitis  -     chlorhexidine (PERIDEX) 0.12 % solution; Swish and spit 15 mLs 2 times daily, Disp-2700 mL, R-1Normal  6. Bipolar affective disorder, remission status unspecified (HCC)          No follow-ups on file.      HPI     Stopped amiloride more than a month ago. Urinating a lot, he doesn't mind. Waking up three times a night to urinate. Would rather not take more medication than he absolutely needs.   Hypertension-tolerating current regimen without chest pain, palpitations, dizziness, peripheral edema, dyspnea on exertion, orthopnea, paroxysmal nocturnal dyspnea.  Hyperlipidemia-tolerating current regimen without myalgias, dyspepsia, jaundice.   COPD:  Current treatment includes short-acting beta agonist inhaler, combined beta agonist/antichoinergic inhaler.  Residual symptoms: chronic dyspnea.  Denies any other symptoms. Requires rescue inhaler 1 time(s) per week    Mostly compliant with diet recommendations for low salt diet, tries to limit greasy/cheesy/fried foods, not very compliant with exercise recommendations.    Cardiovascular risk factors:

## 2025-07-16 RX ORDER — BENZONATATE 100 MG/1
CAPSULE ORAL
Qty: 30 CAPSULE | Refills: 0 | Status: SHIPPED | OUTPATIENT
Start: 2025-07-16

## 2025-07-16 RX ORDER — ALBUTEROL SULFATE 90 UG/1
INHALANT RESPIRATORY (INHALATION)
Qty: 18 G | Refills: 0 | Status: SHIPPED | OUTPATIENT
Start: 2025-07-16

## 2025-07-16 NOTE — TELEPHONE ENCOUNTER
Last visit: 7/14/2025  Last Med refill: 12.17.2024 for 3 months   Does patient have enough medication for 72 hours: No    Next Visit Date:  Future Appointments   Date Time Provider Department Center   7/21/2025 11:20 AM Patel Alfaro MD [facility]   11/18/2025  1:45 PM Joann Simmons MD [facility]       Health Maintenance   Topic Date Due    Pneumococcal 50+ years Vaccine (2 of 2 - PCV) 10/28/2021    Colorectal Cancer Screen  06/11/2025    Hepatitis B vaccine (1 of 3 - 19+ 3-dose series) 08/13/2025 (Originally 10/14/1989)    COVID-19 Vaccine (3 - 2024-25 season) 09/24/2025 (Originally 9/1/2024)    Flu vaccine (1) 08/01/2025    Lipids  08/29/2025    Depression Monitoring  03/02/2026    Lung Cancer Screening &/or Counseling  03/26/2026    Diabetes screen  09/10/2027    DTaP/Tdap/Td vaccine (2 - Td or Tdap) 10/01/2030    Annual Wellness Visit (Medicare [employer]age)  Completed    Shingles vaccine  Completed    Hepatitis C screen  Completed    HIV screen  Completed    Hepatitis A vaccine  Aged Out    Hib vaccine  Aged Out    Polio vaccine  Aged Out    Meningococcal (ACWY) vaccine  Aged Out    Meningococcal B vaccine  Aged Out    Depression Screen  Discontinued    Pneumococcal 0-49 years Vaccine  Discontinued       Hemoglobin A1C (%)   Date Value   09/10/2024 5.4   02/08/2024 5.3   01/21/2022 5.0             ( goal A1C is < 7)   No components found for: \"LABMICR\"  No components found for: \"LDLCHOLESTEROL\", \"LDLCALC\"    (goal LDL is <100)   AST (U/L)   Date Value   06/19/2025 25     ALT (U/L)   Date Value   06/19/2025 26     BUN (MG/DL)   Date Value   06/19/2025 16     BP Readings from Last 3 Encounters:   07/14/25 130/84   03/03/25 128/86   01/17/25 (!) 135/90          (goal 120/80)    All Future Testing planned in [facility]  Lab Frequency Next Occurrence   Celiac Disease Panel Once 03/03/2025               Patient Active Problem List:     Bipolar disorder (HCC)     HTN (hypertension)     Change

## 2025-07-21 ENCOUNTER — OFFICE VISIT (OUTPATIENT)
Dept: UROLOGY | Age: 55
End: 2025-07-21
Payer: MEDICARE

## 2025-07-21 VITALS
SYSTOLIC BLOOD PRESSURE: 134 MMHG | BODY MASS INDEX: 32.07 KG/M2 | TEMPERATURE: 97.6 F | HEART RATE: 88 BPM | DIASTOLIC BLOOD PRESSURE: 82 MMHG | WEIGHT: 224 LBS | HEIGHT: 70 IN | OXYGEN SATURATION: 98 %

## 2025-07-21 DIAGNOSIS — R39.14 FEELING OF INCOMPLETE BLADDER EMPTYING: ICD-10-CM

## 2025-07-21 DIAGNOSIS — N40.1 BPH WITH OBSTRUCTION/LOWER URINARY TRACT SYMPTOMS: Primary | ICD-10-CM

## 2025-07-21 DIAGNOSIS — R35.1 NOCTURIA: ICD-10-CM

## 2025-07-21 DIAGNOSIS — R39.12 WEAK URINARY STREAM: ICD-10-CM

## 2025-07-21 DIAGNOSIS — N13.8 BPH WITH OBSTRUCTION/LOWER URINARY TRACT SYMPTOMS: Primary | ICD-10-CM

## 2025-07-21 PROCEDURE — 3079F DIAST BP 80-89 MM HG: CPT | Performed by: UROLOGY

## 2025-07-21 PROCEDURE — 99204 OFFICE O/P NEW MOD 45 MIN: CPT | Performed by: UROLOGY

## 2025-07-21 PROCEDURE — 3075F SYST BP GE 130 - 139MM HG: CPT | Performed by: UROLOGY

## 2025-07-21 ASSESSMENT — ENCOUNTER SYMPTOMS
RESPIRATORY NEGATIVE: 1
BACK PAIN: 0
NAUSEA: 0
COUGH: 0
GASTROINTESTINAL NEGATIVE: 1
EYE PAIN: 0
EYE REDNESS: 0
EYES NEGATIVE: 1
ALLERGIC/IMMUNOLOGIC NEGATIVE: 1
COLOR CHANGE: 0
ABDOMINAL PAIN: 0
WHEEZING: 0
VOMITING: 0

## 2025-07-21 NOTE — PROGRESS NOTES
Review of Systems   Constitutional: Negative.  Negative for appetite change, chills and fever.   HENT: Negative.     Eyes: Negative.  Negative for pain, redness and visual disturbance.   Respiratory: Negative.  Negative for cough, shortness of breath and wheezing.    Cardiovascular: Negative.  Negative for chest pain and leg swelling.   Gastrointestinal: Negative.  Negative for abdominal pain, nausea and vomiting.   Endocrine: Negative.    Genitourinary:  Positive for frequency. Negative for difficulty urinating, dysuria, flank pain, hematuria, testicular pain and urgency.   Musculoskeletal: Negative.  Negative for back pain, joint swelling and myalgias.   Skin: Negative.  Negative for color change, rash and wound.   Allergic/Immunologic: Negative.    Neurological: Negative.  Negative for dizziness, tremors, weakness, numbness and headaches.   Hematological: Negative.  Negative for adenopathy. Does not bruise/bleed easily.   Psychiatric/Behavioral: Negative.       
by mouth daily      acetaminophen (TYLENOL) 500 MG tablet Take 2 tablets by mouth at bedtime      OXcarbazepine (TRILEPTAL) 600 MG tablet Take 1 tablet by mouth every evening      buPROPion (WELLBUTRIN XL) 300 MG extended release tablet bupropion HCl  mg 24 hr tablet, extended release          No known allergies  Social History     Tobacco Use   Smoking Status Former    Current packs/day: 0.00    Average packs/day: 1 pack/day for 32.5 years (32.5 ttl pk-yrs)    Types: Cigarettes, Cigars, E-Cigarettes    Start date: 1986    Quit date: 2018    Years since quittin.0   Smokeless Tobacco Never   Tobacco Comments    I smoked heavily as a teenager then off and on over the years (more off than on, and only socially)      (If patient a smoker, smoking cessation counseling offered)   Social History     Substance and Sexual Activity   Alcohol Use Not Currently    Comment: Haven’t had a drink in over 4 years       REVIEW OF SYSTEMS:  Review of Systems    Physical Exam:    This a 54 y.o. male   Vitals:    25 1107   BP: 134/82   Pulse: 88   Temp: 97.6 °F (36.4 °C)   SpO2: 98%     Body mass index is 32.14 kg/m².    Physical Exam  Constitutional: Patient in no acute distress.  Neuro: Alert and oriented to person, place and time.  Psych: Mood normal, affect normal  Skin: No rash noted  HEENT: Head: Normocephalic and atraumatic  Conjunctivae and EOM are normal. Pupils are equal, round  Nose: Normal  Right External Ear: Normal; Left External Ear: Normal  Mouth: Mucosa Moist  Neck: Supple  Lungs:Respiratory effort is normal  Cardiovascular: Warm & Pink  Abdomen: Soft, non-tender, non-distendedwith no CVA,  No flank tenderness,  Orhepatosplenomegaly   Lymphatics: No palpable lymphadenopathy.  Bladder non-tender and not distended.  Musculoskeletal: Normal gait and station  Penis normal and circumcised  Urethral meatus normal      Assessment and Plan      1. BPH with obstruction/lower urinary tract symptoms    2.

## 2025-08-25 ENCOUNTER — PROCEDURE VISIT (OUTPATIENT)
Dept: UROLOGY | Age: 55
End: 2025-08-25
Payer: MEDICARE

## 2025-08-25 DIAGNOSIS — N40.1 BPH WITH OBSTRUCTION/LOWER URINARY TRACT SYMPTOMS: Primary | ICD-10-CM

## 2025-08-25 DIAGNOSIS — J44.9 CHRONIC OBSTRUCTIVE PULMONARY DISEASE, UNSPECIFIED COPD TYPE (HCC): ICD-10-CM

## 2025-08-25 DIAGNOSIS — N13.8 BPH WITH OBSTRUCTION/LOWER URINARY TRACT SYMPTOMS: Primary | ICD-10-CM

## 2025-08-25 PROCEDURE — 52000 CYSTOURETHROSCOPY: CPT | Performed by: UROLOGY

## 2025-08-31 DIAGNOSIS — K21.9 GASTROESOPHAGEAL REFLUX DISEASE WITHOUT ESOPHAGITIS: ICD-10-CM

## 2025-08-31 DIAGNOSIS — I10 PRIMARY HYPERTENSION: ICD-10-CM

## 2025-08-31 DIAGNOSIS — E78.5 DYSLIPIDEMIA: ICD-10-CM

## 2025-09-02 RX ORDER — ATORVASTATIN CALCIUM 20 MG/1
20 TABLET, FILM COATED ORAL DAILY
Qty: 90 TABLET | Refills: 1 | Status: SHIPPED | OUTPATIENT
Start: 2025-09-02

## 2025-09-02 RX ORDER — PANTOPRAZOLE SODIUM 20 MG/1
20 TABLET, DELAYED RELEASE ORAL DAILY
Qty: 90 TABLET | Refills: 1 | Status: SHIPPED | OUTPATIENT
Start: 2025-09-02

## 2025-09-02 RX ORDER — FUROSEMIDE 20 MG/1
20 TABLET ORAL DAILY
Qty: 90 TABLET | Refills: 1 | Status: SHIPPED | OUTPATIENT
Start: 2025-09-02

## (undated) DEVICE — GAUZE,SPONGE,4"X4",16PLY,STRL,LF,10/TRAY: Brand: MEDLINE

## (undated) DEVICE — SUTURE PROL SZ 3-0 L18IN NONABSORBABLE BLU L24MM FS-1 3/8 8684G

## (undated) DEVICE — SOLUTION IV IRRIG POUR BRL 0.9% SODIUM CHL 2F7124

## (undated) DEVICE — PREMIUM DRY TRAY LF: Brand: MEDLINE INDUSTRIES, INC.

## (undated) DEVICE — MASTISOL ADHESIVE AMPULE

## (undated) DEVICE — GLOVE SURG SZ 6 L12IN THK75MIL DK GRN LTX FREE

## (undated) DEVICE — SOLUTION SURG PREP POV IOD 7.5% 4 OZ

## (undated) DEVICE — STRIP,CLOSURE,WOUND,MEDI-STRIP,1/2X4: Brand: MEDLINE

## (undated) DEVICE — Device

## (undated) DEVICE — STRAP,POSITIONING,KNEE/BODY,FOAM,4X60": Brand: MEDLINE

## (undated) DEVICE — MHPB HEAD AND NECK  PACK: Brand: MEDLINE INDUSTRIES, INC.